# Patient Record
Sex: MALE | Race: WHITE
[De-identification: names, ages, dates, MRNs, and addresses within clinical notes are randomized per-mention and may not be internally consistent; named-entity substitution may affect disease eponyms.]

---

## 2020-03-03 ENCOUNTER — HOSPITAL ENCOUNTER (OUTPATIENT)
Dept: HOSPITAL 90 - WHH | Age: 65
Discharge: HOME | End: 2020-03-03
Attending: FAMILY MEDICINE
Payer: MEDICARE

## 2020-03-03 VITALS — DIASTOLIC BLOOD PRESSURE: 85 MMHG | SYSTOLIC BLOOD PRESSURE: 155 MMHG

## 2020-03-03 DIAGNOSIS — T86.828: Primary | ICD-10-CM

## 2020-03-03 DIAGNOSIS — E66.01: ICD-10-CM
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
## 2020-03-03 DIAGNOSIS — M13.851: ICD-10-CM

## 2020-03-03 DIAGNOSIS — I89.0: ICD-10-CM

## 2020-03-03 DIAGNOSIS — M13.852: ICD-10-CM

## 2020-03-03 DIAGNOSIS — L97.212: ICD-10-CM

## 2020-03-03 DIAGNOSIS — R01.1: ICD-10-CM

## 2020-03-03 PROCEDURE — 15271 SKIN SUB GRAFT TRNK/ARM/LEG: CPT

## 2020-03-10 ENCOUNTER — HOSPITAL ENCOUNTER (OUTPATIENT)
Dept: HOSPITAL 90 - WHH | Age: 65
Discharge: HOME | End: 2020-03-10
Attending: FAMILY MEDICINE
Payer: MEDICARE

## 2020-03-10 VITALS — SYSTOLIC BLOOD PRESSURE: 135 MMHG | DIASTOLIC BLOOD PRESSURE: 67 MMHG

## 2020-03-10 DIAGNOSIS — E66.01: ICD-10-CM

## 2020-03-10 DIAGNOSIS — I89.0: ICD-10-CM

## 2020-03-10 DIAGNOSIS — M13.852: ICD-10-CM

## 2020-03-10 DIAGNOSIS — R01.1: ICD-10-CM

## 2020-03-10 DIAGNOSIS — T86.828: Primary | ICD-10-CM

## 2020-03-10 DIAGNOSIS — M13.851: ICD-10-CM

## 2020-03-10 DIAGNOSIS — L97.212: ICD-10-CM
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
## 2020-03-10 PROCEDURE — 15271 SKIN SUB GRAFT TRNK/ARM/LEG: CPT

## 2020-03-17 ENCOUNTER — HOSPITAL ENCOUNTER (OUTPATIENT)
Dept: HOSPITAL 90 - WHH | Age: 65
Discharge: HOME | End: 2020-03-17
Attending: FAMILY MEDICINE
Payer: MEDICARE

## 2020-03-17 VITALS — DIASTOLIC BLOOD PRESSURE: 70 MMHG | SYSTOLIC BLOOD PRESSURE: 121 MMHG

## 2020-03-17 DIAGNOSIS — I89.0: ICD-10-CM

## 2020-03-17 DIAGNOSIS — T86.828: Primary | ICD-10-CM

## 2020-03-17 DIAGNOSIS — R01.1: ICD-10-CM
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
## 2020-03-17 DIAGNOSIS — I83.012: ICD-10-CM

## 2020-03-17 DIAGNOSIS — L97.211: ICD-10-CM

## 2020-03-17 DIAGNOSIS — E66.01: ICD-10-CM

## 2020-03-17 DIAGNOSIS — M13.852: ICD-10-CM

## 2020-03-17 DIAGNOSIS — M13.851: ICD-10-CM

## 2020-03-17 DIAGNOSIS — R73.03: ICD-10-CM

## 2020-03-17 PROCEDURE — 15271 SKIN SUB GRAFT TRNK/ARM/LEG: CPT

## 2020-03-27 ENCOUNTER — HOSPITAL ENCOUNTER (OUTPATIENT)
Dept: HOSPITAL 90 - WHH | Age: 65
Discharge: HOME | End: 2020-03-27
Attending: FAMILY MEDICINE
Payer: MEDICARE

## 2020-03-27 VITALS — SYSTOLIC BLOOD PRESSURE: 158 MMHG | DIASTOLIC BLOOD PRESSURE: 80 MMHG

## 2020-03-27 DIAGNOSIS — E66.01: ICD-10-CM

## 2020-03-27 DIAGNOSIS — L97.212: ICD-10-CM

## 2020-03-27 DIAGNOSIS — R01.1: ICD-10-CM

## 2020-03-27 DIAGNOSIS — I89.0: ICD-10-CM

## 2020-03-27 DIAGNOSIS — I83.012: ICD-10-CM

## 2020-03-27 DIAGNOSIS — E11.622: Primary | ICD-10-CM

## 2020-03-27 DIAGNOSIS — M13.852: ICD-10-CM

## 2020-03-27 DIAGNOSIS — M13.851: ICD-10-CM

## 2020-03-27 PROCEDURE — 11042 DBRDMT SUBQ TIS 1ST 20SQCM/<: CPT

## 2020-03-31 ENCOUNTER — HOSPITAL ENCOUNTER (OUTPATIENT)
Dept: HOSPITAL 90 - WHH | Age: 65
Discharge: HOME | End: 2020-03-31
Attending: FAMILY MEDICINE
Payer: MEDICARE

## 2020-03-31 VITALS — SYSTOLIC BLOOD PRESSURE: 144 MMHG | DIASTOLIC BLOOD PRESSURE: 77 MMHG

## 2020-03-31 DIAGNOSIS — E66.01: ICD-10-CM

## 2020-03-31 DIAGNOSIS — M13.852: ICD-10-CM

## 2020-03-31 DIAGNOSIS — M13.851: ICD-10-CM

## 2020-03-31 DIAGNOSIS — I83.012: ICD-10-CM

## 2020-03-31 DIAGNOSIS — E11.622: ICD-10-CM
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
## 2020-03-31 DIAGNOSIS — L97.211: ICD-10-CM

## 2020-03-31 DIAGNOSIS — R01.1: ICD-10-CM

## 2020-03-31 DIAGNOSIS — I89.0: ICD-10-CM

## 2020-03-31 DIAGNOSIS — T86.828: Primary | ICD-10-CM

## 2020-03-31 PROCEDURE — 15271 SKIN SUB GRAFT TRNK/ARM/LEG: CPT

## 2020-04-07 ENCOUNTER — HOSPITAL ENCOUNTER (OUTPATIENT)
Dept: HOSPITAL 90 - WHH | Age: 65
Discharge: HOME | End: 2020-04-07
Attending: FAMILY MEDICINE
Payer: MEDICARE

## 2020-04-07 VITALS — SYSTOLIC BLOOD PRESSURE: 114 MMHG | DIASTOLIC BLOOD PRESSURE: 68 MMHG

## 2020-04-07 DIAGNOSIS — M13.851: ICD-10-CM

## 2020-04-07 DIAGNOSIS — I83.012: ICD-10-CM

## 2020-04-07 DIAGNOSIS — E66.01: ICD-10-CM
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
## 2020-04-07 DIAGNOSIS — I89.0: ICD-10-CM

## 2020-04-07 DIAGNOSIS — M13.852: ICD-10-CM

## 2020-04-07 DIAGNOSIS — E11.622: ICD-10-CM

## 2020-04-07 DIAGNOSIS — I87.2: ICD-10-CM

## 2020-04-07 DIAGNOSIS — T86.828: Primary | ICD-10-CM

## 2020-04-07 DIAGNOSIS — R01.1: ICD-10-CM

## 2020-04-07 DIAGNOSIS — L97.211: ICD-10-CM

## 2020-04-07 PROCEDURE — 15271 SKIN SUB GRAFT TRNK/ARM/LEG: CPT

## 2020-04-14 ENCOUNTER — HOSPITAL ENCOUNTER (OUTPATIENT)
Dept: HOSPITAL 90 - WHH | Age: 65
Discharge: HOME | End: 2020-04-14
Attending: FAMILY MEDICINE
Payer: MEDICARE

## 2020-04-14 VITALS — SYSTOLIC BLOOD PRESSURE: 144 MMHG | DIASTOLIC BLOOD PRESSURE: 80 MMHG

## 2020-04-14 DIAGNOSIS — I89.0: ICD-10-CM

## 2020-04-14 DIAGNOSIS — E66.01: ICD-10-CM

## 2020-04-14 DIAGNOSIS — R01.1: ICD-10-CM

## 2020-04-14 DIAGNOSIS — L97.211: ICD-10-CM

## 2020-04-14 DIAGNOSIS — T86.828: Primary | ICD-10-CM

## 2020-04-14 DIAGNOSIS — I83.012: ICD-10-CM

## 2020-04-14 DIAGNOSIS — I87.2: ICD-10-CM

## 2020-04-14 DIAGNOSIS — M13.852: ICD-10-CM
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
## 2020-04-14 DIAGNOSIS — M13.851: ICD-10-CM

## 2020-04-14 DIAGNOSIS — E11.622: ICD-10-CM

## 2020-04-14 PROCEDURE — 15271 SKIN SUB GRAFT TRNK/ARM/LEG: CPT

## 2020-04-21 ENCOUNTER — HOSPITAL ENCOUNTER (OUTPATIENT)
Dept: HOSPITAL 90 - WHH | Age: 65
Discharge: HOME | End: 2020-04-21
Attending: FAMILY MEDICINE
Payer: MEDICARE

## 2020-04-21 VITALS — SYSTOLIC BLOOD PRESSURE: 126 MMHG | DIASTOLIC BLOOD PRESSURE: 82 MMHG

## 2020-04-21 DIAGNOSIS — M13.852: ICD-10-CM

## 2020-04-21 DIAGNOSIS — E11.622: ICD-10-CM

## 2020-04-21 DIAGNOSIS — I89.0: ICD-10-CM

## 2020-04-21 DIAGNOSIS — L97.211: ICD-10-CM
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
## 2020-04-21 DIAGNOSIS — M13.851: ICD-10-CM

## 2020-04-21 DIAGNOSIS — I83.012: ICD-10-CM

## 2020-04-21 DIAGNOSIS — E66.01: ICD-10-CM

## 2020-04-21 DIAGNOSIS — R01.1: ICD-10-CM

## 2020-04-21 DIAGNOSIS — T86.828: Primary | ICD-10-CM

## 2020-04-21 DIAGNOSIS — I87.2: ICD-10-CM

## 2020-04-21 PROCEDURE — 15271 SKIN SUB GRAFT TRNK/ARM/LEG: CPT

## 2020-04-28 ENCOUNTER — HOSPITAL ENCOUNTER (OUTPATIENT)
Dept: HOSPITAL 90 - WHH | Age: 65
Discharge: HOME | End: 2020-04-28
Attending: FAMILY MEDICINE
Payer: MEDICARE

## 2020-04-28 VITALS — DIASTOLIC BLOOD PRESSURE: 84 MMHG | SYSTOLIC BLOOD PRESSURE: 134 MMHG

## 2020-04-28 DIAGNOSIS — E11.622: ICD-10-CM

## 2020-04-28 DIAGNOSIS — I89.0: ICD-10-CM

## 2020-04-28 DIAGNOSIS — E66.01: ICD-10-CM

## 2020-04-28 DIAGNOSIS — T86.828: Primary | ICD-10-CM

## 2020-04-28 DIAGNOSIS — R01.1: ICD-10-CM

## 2020-04-28 DIAGNOSIS — M13.851: ICD-10-CM
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
## 2020-04-28 DIAGNOSIS — I83.012: ICD-10-CM

## 2020-04-28 DIAGNOSIS — M13.852: ICD-10-CM

## 2020-04-28 DIAGNOSIS — L97.211: ICD-10-CM

## 2020-04-28 DIAGNOSIS — I87.2: ICD-10-CM

## 2020-04-28 PROCEDURE — 15271 SKIN SUB GRAFT TRNK/ARM/LEG: CPT

## 2020-05-08 ENCOUNTER — HOSPITAL ENCOUNTER (OUTPATIENT)
Dept: HOSPITAL 90 - WHH | Age: 65
Discharge: HOME | End: 2020-05-08
Attending: FAMILY MEDICINE
Payer: MEDICARE

## 2020-05-08 VITALS — SYSTOLIC BLOOD PRESSURE: 135 MMHG | DIASTOLIC BLOOD PRESSURE: 76 MMHG

## 2020-05-08 DIAGNOSIS — R01.1: ICD-10-CM

## 2020-05-08 DIAGNOSIS — M13.852: ICD-10-CM

## 2020-05-08 DIAGNOSIS — L97.212: ICD-10-CM

## 2020-05-08 DIAGNOSIS — E11.622: ICD-10-CM

## 2020-05-08 DIAGNOSIS — M13.851: ICD-10-CM

## 2020-05-08 DIAGNOSIS — I87.2: ICD-10-CM

## 2020-05-08 DIAGNOSIS — T86.828: Primary | ICD-10-CM
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
## 2020-05-08 DIAGNOSIS — I89.0: ICD-10-CM

## 2020-05-08 DIAGNOSIS — E66.01: ICD-10-CM

## 2020-05-08 DIAGNOSIS — I83.012: ICD-10-CM

## 2020-05-08 PROCEDURE — 11042 DBRDMT SUBQ TIS 1ST 20SQCM/<: CPT

## 2020-05-12 ENCOUNTER — HOSPITAL ENCOUNTER (OUTPATIENT)
Dept: HOSPITAL 90 - WHH | Age: 65
Discharge: HOME | End: 2020-05-12
Attending: FAMILY MEDICINE
Payer: MEDICARE

## 2020-05-12 VITALS — DIASTOLIC BLOOD PRESSURE: 83 MMHG | SYSTOLIC BLOOD PRESSURE: 142 MMHG

## 2020-05-12 DIAGNOSIS — M13.852: ICD-10-CM

## 2020-05-12 DIAGNOSIS — M13.851: ICD-10-CM

## 2020-05-12 DIAGNOSIS — T86.828: Primary | ICD-10-CM

## 2020-05-12 DIAGNOSIS — E66.01: ICD-10-CM

## 2020-05-12 DIAGNOSIS — I87.2: ICD-10-CM
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
## 2020-05-12 DIAGNOSIS — I89.0: ICD-10-CM

## 2020-05-12 DIAGNOSIS — R01.1: ICD-10-CM

## 2020-05-12 DIAGNOSIS — I83.012: ICD-10-CM

## 2020-05-12 DIAGNOSIS — L97.212: ICD-10-CM

## 2020-05-12 PROCEDURE — 11042 DBRDMT SUBQ TIS 1ST 20SQCM/<: CPT

## 2020-05-13 ENCOUNTER — HOSPITAL ENCOUNTER (OUTPATIENT)
Dept: HOSPITAL 90 - RAH | Age: 65
Discharge: HOME | End: 2020-05-13
Attending: FAMILY MEDICINE
Payer: MEDICARE

## 2020-05-13 DIAGNOSIS — M71.21: Primary | ICD-10-CM

## 2020-05-13 DIAGNOSIS — L97.211: ICD-10-CM

## 2020-05-13 DIAGNOSIS — R60.0: ICD-10-CM

## 2020-05-13 LAB
BUN SERPL-MCNC: 17 MG/DL (ref 7–18)
CHLORIDE SERPL-SCNC: 104 MMOL/L (ref 101–111)
CO2 SERPL-SCNC: 31 MMOL/L (ref 21–32)
CREAT SERPL-MCNC: 1.4 MG/DL (ref 0.5–1.5)
GFR SERPL CREATININE-BSD FRML MDRD: 54 ML/MIN (ref 60–?)
GLUCOSE SERPL-MCNC: 120 MG/DL (ref 70–105)
POTASSIUM SERPL-SCNC: 5.2 MMOL/L (ref 3.5–5.1)
SODIUM SERPL-SCNC: 139 MMOL/L (ref 136–145)

## 2020-05-13 PROCEDURE — 80048 BASIC METABOLIC PNL TOTAL CA: CPT

## 2020-05-13 PROCEDURE — 36415 COLL VENOUS BLD VENIPUNCTURE: CPT

## 2020-05-13 PROCEDURE — 73702 CT LWR EXTREMITY W/O&W/DYE: CPT

## 2020-05-22 ENCOUNTER — HOSPITAL ENCOUNTER (OUTPATIENT)
Dept: HOSPITAL 90 - WHH | Age: 65
Discharge: HOME | End: 2020-05-22
Attending: FAMILY MEDICINE
Payer: MEDICARE

## 2020-05-22 VITALS — DIASTOLIC BLOOD PRESSURE: 79 MMHG | SYSTOLIC BLOOD PRESSURE: 160 MMHG
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
## 2020-05-22 DIAGNOSIS — I89.0: ICD-10-CM

## 2020-05-22 DIAGNOSIS — S91.101A: ICD-10-CM

## 2020-05-22 DIAGNOSIS — R01.1: ICD-10-CM

## 2020-05-22 DIAGNOSIS — M13.851: ICD-10-CM

## 2020-05-22 DIAGNOSIS — M71.21: ICD-10-CM

## 2020-05-22 DIAGNOSIS — M13.852: ICD-10-CM

## 2020-05-22 DIAGNOSIS — I87.2: ICD-10-CM

## 2020-05-22 DIAGNOSIS — L97.211: Primary | ICD-10-CM

## 2020-05-22 DIAGNOSIS — I83.90: ICD-10-CM
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
## 2020-05-22 DIAGNOSIS — E66.01: ICD-10-CM

## 2020-05-22 PROCEDURE — 11042 DBRDMT SUBQ TIS 1ST 20SQCM/<: CPT

## 2020-05-27 ENCOUNTER — HOSPITAL ENCOUNTER (OUTPATIENT)
Dept: HOSPITAL 90 - WHH | Age: 65
Discharge: HOME | End: 2020-05-27
Attending: SPECIALIST
Payer: MEDICARE

## 2020-05-27 VITALS — DIASTOLIC BLOOD PRESSURE: 85 MMHG | SYSTOLIC BLOOD PRESSURE: 141 MMHG

## 2020-05-27 DIAGNOSIS — E11.622: Primary | ICD-10-CM

## 2020-05-27 DIAGNOSIS — M13.851: ICD-10-CM

## 2020-05-27 DIAGNOSIS — L97.211: ICD-10-CM
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
## 2020-05-27 DIAGNOSIS — I87.2: ICD-10-CM

## 2020-05-27 DIAGNOSIS — M13.852: ICD-10-CM

## 2020-05-27 DIAGNOSIS — S91.101D: ICD-10-CM

## 2020-05-27 DIAGNOSIS — I83.90: ICD-10-CM

## 2020-05-27 DIAGNOSIS — M71.21: ICD-10-CM

## 2020-05-27 DIAGNOSIS — I89.0: ICD-10-CM

## 2020-05-27 DIAGNOSIS — E66.01: ICD-10-CM

## 2020-05-27 NOTE — NUR
Patient presents to clinic today for evaluation by Dr. Mariscal for application of Stravix. 
 Patient is in agreement and procedure will be scheduled once authorization obtained. 

-------------------------------------------------------------------------------

Addendum: 05/27/20 at 1503 by OUSMANE ARANDA RN/JUAN

-------------------------------------------------------------------------------

Amended: Links added.

## 2020-05-29 LAB
BASOPHILS NFR BLD AUTO: 0.3 % (ref 0–5)
BUN SERPL-MCNC: 22 MG/DL (ref 7–18)
CHLORIDE SERPL-SCNC: 101 MMOL/L (ref 101–111)
CO2 SERPL-SCNC: 29 MMOL/L (ref 21–32)
CREAT SERPL-MCNC: 1.3 MG/DL (ref 0.5–1.5)
EOSINOPHIL NFR BLD AUTO: 0.1 % (ref 0–8)
ERYTHROCYTE [DISTWIDTH] IN BLOOD BY AUTOMATED COUNT: 13.3 % (ref 11–15.5)
GFR SERPL CREATININE-BSD FRML MDRD: 59 ML/MIN (ref 60–?)
GLUCOSE SERPL-MCNC: 131 MG/DL (ref 70–105)
HCT VFR BLD AUTO: 40.2 % (ref 42–54)
INR PPP: 0.98 (ref 0.85–1.15)
LYMPHOCYTES NFR SPEC AUTO: 11.6 % (ref 21–51)
MCH RBC QN AUTO: 32.8 PG (ref 27–33)
MCHC RBC AUTO-ENTMCNC: 32.8 G/DL (ref 32–36)
MCV RBC AUTO: 99.8 FL (ref 79–99)
MONOCYTES NFR BLD AUTO: 4.4 % (ref 3–13)
NEUTROPHILS NFR BLD AUTO: 83.1 % (ref 40–77)
NRBC BLD MANUAL-RTO: 0 % (ref 0–0.19)
PLATELET # BLD AUTO: 244 K/UL (ref 130–400)
POTASSIUM SERPL-SCNC: 4.8 MMOL/L (ref 3.5–5.1)
PROTHROMBIN TIME: 10.6 SEC (ref 9.6–11.6)
RBC # BLD AUTO: 4.03 MIL/UL (ref 4.5–6.2)
SODIUM SERPL-SCNC: 136 MMOL/L (ref 136–145)
WBC # BLD AUTO: 11.1 K/UL (ref 4.8–10.8)

## 2020-06-02 VITALS — DIASTOLIC BLOOD PRESSURE: 85 MMHG | SYSTOLIC BLOOD PRESSURE: 159 MMHG

## 2020-06-03 ENCOUNTER — HOSPITAL ENCOUNTER (OUTPATIENT)
Dept: HOSPITAL 90 - DAH | Age: 65
Discharge: HOME | End: 2020-06-03
Attending: SPECIALIST
Payer: MEDICARE

## 2020-06-03 VITALS — SYSTOLIC BLOOD PRESSURE: 154 MMHG | DIASTOLIC BLOOD PRESSURE: 97 MMHG

## 2020-06-03 VITALS — DIASTOLIC BLOOD PRESSURE: 84 MMHG | SYSTOLIC BLOOD PRESSURE: 153 MMHG

## 2020-06-03 VITALS — SYSTOLIC BLOOD PRESSURE: 161 MMHG | DIASTOLIC BLOOD PRESSURE: 81 MMHG

## 2020-06-03 VITALS — SYSTOLIC BLOOD PRESSURE: 152 MMHG | DIASTOLIC BLOOD PRESSURE: 85 MMHG

## 2020-06-03 VITALS — SYSTOLIC BLOOD PRESSURE: 153 MMHG | DIASTOLIC BLOOD PRESSURE: 90 MMHG

## 2020-06-03 VITALS — BODY MASS INDEX: 40.71 KG/M2 | WEIGHT: 259.4 LBS | HEIGHT: 67 IN

## 2020-06-03 VITALS — SYSTOLIC BLOOD PRESSURE: 152 MMHG | DIASTOLIC BLOOD PRESSURE: 96 MMHG

## 2020-06-03 VITALS — SYSTOLIC BLOOD PRESSURE: 129 MMHG | DIASTOLIC BLOOD PRESSURE: 81 MMHG

## 2020-06-03 VITALS — DIASTOLIC BLOOD PRESSURE: 76 MMHG | SYSTOLIC BLOOD PRESSURE: 159 MMHG

## 2020-06-03 VITALS — SYSTOLIC BLOOD PRESSURE: 146 MMHG | DIASTOLIC BLOOD PRESSURE: 79 MMHG

## 2020-06-03 VITALS — SYSTOLIC BLOOD PRESSURE: 130 MMHG | DIASTOLIC BLOOD PRESSURE: 82 MMHG

## 2020-06-03 VITALS — SYSTOLIC BLOOD PRESSURE: 122 MMHG | DIASTOLIC BLOOD PRESSURE: 75 MMHG

## 2020-06-03 DIAGNOSIS — Z11.59: ICD-10-CM

## 2020-06-03 DIAGNOSIS — M19.90: ICD-10-CM

## 2020-06-03 DIAGNOSIS — Z79.01: ICD-10-CM

## 2020-06-03 DIAGNOSIS — L08.89: Primary | ICD-10-CM

## 2020-06-03 PROCEDURE — 85025 COMPLETE CBC W/AUTO DIFF WBC: CPT

## 2020-06-03 PROCEDURE — 80048 BASIC METABOLIC PNL TOTAL CA: CPT

## 2020-06-03 PROCEDURE — 93005 ELECTROCARDIOGRAM TRACING: CPT

## 2020-06-03 PROCEDURE — 15271 SKIN SUB GRAFT TRNK/ARM/LEG: CPT

## 2020-06-03 PROCEDURE — 36415 COLL VENOUS BLD VENIPUNCTURE: CPT

## 2020-06-03 PROCEDURE — 85610 PROTHROMBIN TIME: CPT

## 2020-06-03 PROCEDURE — 71045 X-RAY EXAM CHEST 1 VIEW: CPT

## 2020-06-03 NOTE — NUR
RECEIVE

PT RECEIVED FROM PACU VIA STRETCHER AWAKE ALERT ORIENTED. PT STABLE. NO COMPLAINTS MADE. 
DRESSING TO RIGHT CALF DRY AND INTACT, NO OOZING NOTED. CALL BELL WITHIN REACH, WILL CALL 
WIFE FOR DISCHARGE INSTRUCTIONS AND RIDE HOME.

## 2020-06-03 NOTE — NUR
DISCHARGE

PT DISCHARGED VIA WHEELCHAIR WITH WIFE. PT STABLE. DRESSING TO RIGHT CALF REMAINS DRY AND 
INTACT. CALLED DR. RING WITH REGARDS TO WEIGHT BEARING STATUS, AS PER DR. RING, PT 
MAY AMBULATE LIKE AS PRIOR TO SURGERY. PT INSTRUCTED. AS PER DR. RING, SEND OINTMENT 
WITH PT AND HAVE HIM FOLLOW UP WITH WOUND CARE. PT STATES THAT DR. RING HAD TOLD HIM 
NOT TO TOUCH DRESSING. DISCHARGE INSTRUCTIONS GIVEN TO WIFE AND PT, BOTH VERBALIZED 
UNDERSTANDING. VOIDED PRIOR TO DISCHARGE.

## 2020-06-09 ENCOUNTER — HOSPITAL ENCOUNTER (OUTPATIENT)
Dept: HOSPITAL 90 - WHH | Age: 65
Discharge: HOME | End: 2020-06-09
Attending: FAMILY MEDICINE
Payer: MEDICARE

## 2020-06-09 VITALS — SYSTOLIC BLOOD PRESSURE: 120 MMHG | DIASTOLIC BLOOD PRESSURE: 80 MMHG
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
## 2020-06-09 DIAGNOSIS — S91.101D: ICD-10-CM

## 2020-06-09 DIAGNOSIS — I89.0: ICD-10-CM

## 2020-06-09 DIAGNOSIS — M13.851: ICD-10-CM

## 2020-06-09 DIAGNOSIS — E66.01: ICD-10-CM
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
## 2020-06-09 DIAGNOSIS — T86.828: Primary | ICD-10-CM

## 2020-06-09 DIAGNOSIS — I83.90: ICD-10-CM

## 2020-06-09 DIAGNOSIS — M71.21: ICD-10-CM
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
## 2020-06-09 DIAGNOSIS — E11.622: ICD-10-CM

## 2020-06-09 DIAGNOSIS — I87.2: ICD-10-CM

## 2020-06-09 DIAGNOSIS — M13.852: ICD-10-CM

## 2020-06-09 DIAGNOSIS — L97.212: ICD-10-CM

## 2020-06-09 PROCEDURE — 11042 DBRDMT SUBQ TIS 1ST 20SQCM/<: CPT

## 2020-06-12 ENCOUNTER — HOSPITAL ENCOUNTER (OUTPATIENT)
Dept: HOSPITAL 90 - WHH | Age: 65
Discharge: HOME | End: 2020-06-12
Attending: FAMILY MEDICINE
Payer: MEDICARE

## 2020-06-12 VITALS — SYSTOLIC BLOOD PRESSURE: 159 MMHG | DIASTOLIC BLOOD PRESSURE: 90 MMHG

## 2020-06-12 DIAGNOSIS — T81.41XD: Primary | ICD-10-CM

## 2020-06-12 DIAGNOSIS — I89.0: ICD-10-CM

## 2020-06-12 DIAGNOSIS — S91.101D: ICD-10-CM

## 2020-06-12 DIAGNOSIS — L97.211: ICD-10-CM

## 2020-06-12 DIAGNOSIS — M13.851: ICD-10-CM

## 2020-06-12 DIAGNOSIS — E11.622: ICD-10-CM
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
## 2020-06-12 DIAGNOSIS — I83.90: ICD-10-CM

## 2020-06-12 DIAGNOSIS — M71.21: ICD-10-CM

## 2020-06-12 DIAGNOSIS — I87.2: ICD-10-CM

## 2020-06-12 DIAGNOSIS — M13.852: ICD-10-CM

## 2020-06-12 DIAGNOSIS — E66.01: ICD-10-CM
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
## 2020-06-16 ENCOUNTER — HOSPITAL ENCOUNTER (OUTPATIENT)
Dept: HOSPITAL 90 - WHH | Age: 65
Discharge: HOME | End: 2020-06-16
Attending: FAMILY MEDICINE
Payer: MEDICARE

## 2020-06-16 VITALS
HEART RATE: 76 BPM | TEMPERATURE: 98.3 F | DIASTOLIC BLOOD PRESSURE: 85 MMHG | SYSTOLIC BLOOD PRESSURE: 137 MMHG | RESPIRATION RATE: 18 BRPM

## 2020-06-16 DIAGNOSIS — I83.90: ICD-10-CM

## 2020-06-16 DIAGNOSIS — I87.2: ICD-10-CM

## 2020-06-16 DIAGNOSIS — L97.212: ICD-10-CM

## 2020-06-16 DIAGNOSIS — M71.21: ICD-10-CM

## 2020-06-16 DIAGNOSIS — T81.41XD: Primary | ICD-10-CM

## 2020-06-16 DIAGNOSIS — E66.01: ICD-10-CM

## 2020-06-16 DIAGNOSIS — M19.90: ICD-10-CM
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
## 2020-06-16 DIAGNOSIS — I83.012: ICD-10-CM

## 2020-06-16 DIAGNOSIS — E11.622: ICD-10-CM

## 2020-06-16 DIAGNOSIS — Z79.01: ICD-10-CM

## 2020-06-16 DIAGNOSIS — I89.0: ICD-10-CM

## 2020-06-16 PROCEDURE — 11042 DBRDMT SUBQ TIS 1ST 20SQCM/<: CPT

## 2020-06-19 ENCOUNTER — HOSPITAL ENCOUNTER (OUTPATIENT)
Dept: HOSPITAL 90 - WHH | Age: 65
Discharge: HOME | End: 2020-06-19
Attending: FAMILY MEDICINE
Payer: MEDICARE

## 2020-06-19 DIAGNOSIS — Z79.01: ICD-10-CM

## 2020-06-19 DIAGNOSIS — I83.90: ICD-10-CM

## 2020-06-19 DIAGNOSIS — E66.01: ICD-10-CM

## 2020-06-19 DIAGNOSIS — M13.851: ICD-10-CM

## 2020-06-19 DIAGNOSIS — I89.0: ICD-10-CM

## 2020-06-19 DIAGNOSIS — M13.852: ICD-10-CM

## 2020-06-19 DIAGNOSIS — I87.2: ICD-10-CM

## 2020-06-19 DIAGNOSIS — T81.41XD: Primary | ICD-10-CM

## 2020-06-19 DIAGNOSIS — E11.9: ICD-10-CM
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
## 2020-06-23 ENCOUNTER — HOSPITAL ENCOUNTER (OUTPATIENT)
Dept: HOSPITAL 90 - WHH | Age: 65
Discharge: HOME | End: 2020-06-23
Attending: FAMILY MEDICINE
Payer: MEDICARE

## 2020-06-23 DIAGNOSIS — E11.622: ICD-10-CM

## 2020-06-23 DIAGNOSIS — Z79.01: ICD-10-CM

## 2020-06-23 DIAGNOSIS — M19.90: ICD-10-CM

## 2020-06-23 DIAGNOSIS — E66.01: ICD-10-CM
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
## 2020-06-23 DIAGNOSIS — I87.2: ICD-10-CM

## 2020-06-23 DIAGNOSIS — I83.012: ICD-10-CM

## 2020-06-23 DIAGNOSIS — I89.0: ICD-10-CM

## 2020-06-23 DIAGNOSIS — T81.41XD: Primary | ICD-10-CM

## 2020-06-23 DIAGNOSIS — M16.12: ICD-10-CM

## 2020-06-23 DIAGNOSIS — L97.212: ICD-10-CM

## 2020-06-23 DIAGNOSIS — M47.814: ICD-10-CM

## 2020-06-23 PROCEDURE — 11042 DBRDMT SUBQ TIS 1ST 20SQCM/<: CPT

## 2020-06-24 ENCOUNTER — HOSPITAL ENCOUNTER (OUTPATIENT)
Dept: HOSPITAL 90 - RAH | Age: 65
Discharge: HOME | End: 2020-06-24
Attending: FAMILY MEDICINE
Payer: MEDICARE

## 2020-06-24 DIAGNOSIS — M47.814: ICD-10-CM

## 2020-06-24 DIAGNOSIS — R65.20: ICD-10-CM
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
## 2020-06-24 DIAGNOSIS — T81.49XD: Primary | ICD-10-CM

## 2020-06-24 LAB
APTT PPP: 24.8 SEC (ref 26.3–35.5)
BASOPHILS NFR BLD AUTO: 0.8 % (ref 0–5)
BUN SERPL-MCNC: 22 MG/DL (ref 7–18)
CHLORIDE SERPL-SCNC: 106 MMOL/L (ref 101–111)
CO2 SERPL-SCNC: 30 MMOL/L (ref 21–32)
CREAT SERPL-MCNC: 1.4 MG/DL (ref 0.5–1.5)
EOSINOPHIL NFR BLD AUTO: 3.4 % (ref 0–8)
ERYTHROCYTE [DISTWIDTH] IN BLOOD BY AUTOMATED COUNT: 13.3 % (ref 11–15.5)
GFR SERPL CREATININE-BSD FRML MDRD: 54 ML/MIN (ref 60–?)
GLUCOSE SERPL-MCNC: 106 MG/DL (ref 70–105)
HCT VFR BLD AUTO: 39.8 % (ref 42–54)
INR PPP: 0.95 (ref 0.85–1.15)
LYMPHOCYTES NFR SPEC AUTO: 34.5 % (ref 21–51)
MCH RBC QN AUTO: 33.6 PG (ref 27–33)
MCHC RBC AUTO-ENTMCNC: 33.7 G/DL (ref 32–36)
MCV RBC AUTO: 99.7 FL (ref 79–99)
MONOCYTES NFR BLD AUTO: 14.7 % (ref 3–13)
NEUTROPHILS NFR BLD AUTO: 46.1 % (ref 40–77)
NRBC BLD MANUAL-RTO: 0 % (ref 0–0.19)
PLATELET # BLD AUTO: 221 K/UL (ref 130–400)
POTASSIUM SERPL-SCNC: 4.3 MMOL/L (ref 3.5–5.1)
PROTHROMBIN TIME: 10.3 SEC (ref 9.6–11.6)
RBC # BLD AUTO: 3.99 MIL/UL (ref 4.5–6.2)
SODIUM SERPL-SCNC: 139 MMOL/L (ref 136–145)
WBC # BLD AUTO: 6 K/UL (ref 4.8–10.8)

## 2020-06-24 PROCEDURE — 93005 ELECTROCARDIOGRAM TRACING: CPT

## 2020-06-24 PROCEDURE — 85730 THROMBOPLASTIN TIME PARTIAL: CPT

## 2020-06-24 PROCEDURE — 71046 X-RAY EXAM CHEST 2 VIEWS: CPT

## 2020-06-24 PROCEDURE — 80048 BASIC METABOLIC PNL TOTAL CA: CPT

## 2020-06-24 PROCEDURE — 85025 COMPLETE CBC W/AUTO DIFF WBC: CPT

## 2020-06-24 PROCEDURE — 85610 PROTHROMBIN TIME: CPT

## 2020-06-24 PROCEDURE — 36415 COLL VENOUS BLD VENIPUNCTURE: CPT

## 2020-06-30 ENCOUNTER — HOSPITAL ENCOUNTER (OUTPATIENT)
Dept: HOSPITAL 90 - WHH | Age: 65
Discharge: HOME | End: 2020-06-30
Attending: FAMILY MEDICINE
Payer: MEDICARE
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
## 2020-06-30 DIAGNOSIS — E11.622: ICD-10-CM

## 2020-06-30 DIAGNOSIS — I83.012: ICD-10-CM

## 2020-06-30 DIAGNOSIS — E66.01: ICD-10-CM

## 2020-06-30 DIAGNOSIS — R01.1: ICD-10-CM

## 2020-06-30 DIAGNOSIS — M16.0: ICD-10-CM

## 2020-06-30 DIAGNOSIS — I89.0: ICD-10-CM

## 2020-06-30 DIAGNOSIS — M16.12: ICD-10-CM

## 2020-06-30 DIAGNOSIS — I87.2: ICD-10-CM

## 2020-06-30 DIAGNOSIS — M19.90: ICD-10-CM

## 2020-06-30 DIAGNOSIS — L97.212: ICD-10-CM

## 2020-06-30 DIAGNOSIS — M47.819: ICD-10-CM

## 2020-06-30 DIAGNOSIS — Z79.01: ICD-10-CM

## 2020-06-30 DIAGNOSIS — T81.41XD: Primary | ICD-10-CM

## 2020-06-30 PROCEDURE — 11042 DBRDMT SUBQ TIS 1ST 20SQCM/<: CPT

## 2020-07-10 ENCOUNTER — HOSPITAL ENCOUNTER (OUTPATIENT)
Dept: HOSPITAL 90 - WHH | Age: 65
Discharge: HOME | End: 2020-07-10
Attending: FAMILY MEDICINE
Payer: MEDICARE

## 2020-07-10 DIAGNOSIS — I83.012: ICD-10-CM

## 2020-07-10 DIAGNOSIS — I87.2: ICD-10-CM

## 2020-07-10 DIAGNOSIS — T86.828: Primary | ICD-10-CM

## 2020-07-10 DIAGNOSIS — Z79.01: ICD-10-CM

## 2020-07-10 DIAGNOSIS — E66.01: ICD-10-CM

## 2020-07-10 DIAGNOSIS — M47.819: ICD-10-CM

## 2020-07-10 DIAGNOSIS — L97.212: ICD-10-CM

## 2020-07-10 DIAGNOSIS — R01.1: ICD-10-CM

## 2020-07-10 DIAGNOSIS — M19.90: ICD-10-CM

## 2020-07-10 DIAGNOSIS — I89.0: ICD-10-CM

## 2020-07-10 DIAGNOSIS — M16.0: ICD-10-CM

## 2020-07-10 DIAGNOSIS — M16.12: ICD-10-CM

## 2020-07-10 DIAGNOSIS — E11.622: ICD-10-CM
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
## 2020-07-14 ENCOUNTER — HOSPITAL ENCOUNTER (OUTPATIENT)
Dept: HOSPITAL 90 - WHH | Age: 65
Discharge: HOME | End: 2020-07-14
Attending: FAMILY MEDICINE
Payer: MEDICARE

## 2020-07-14 DIAGNOSIS — Z79.01: ICD-10-CM

## 2020-07-14 DIAGNOSIS — I83.012: ICD-10-CM

## 2020-07-14 DIAGNOSIS — E66.01: ICD-10-CM

## 2020-07-14 DIAGNOSIS — I87.2: ICD-10-CM

## 2020-07-14 DIAGNOSIS — M16.12: ICD-10-CM

## 2020-07-14 DIAGNOSIS — L97.212: ICD-10-CM

## 2020-07-14 DIAGNOSIS — I89.0: ICD-10-CM

## 2020-07-14 DIAGNOSIS — R01.1: ICD-10-CM
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
## 2020-07-14 DIAGNOSIS — M19.90: ICD-10-CM

## 2020-07-14 DIAGNOSIS — M47.819: ICD-10-CM

## 2020-07-14 DIAGNOSIS — E11.622: Primary | ICD-10-CM

## 2020-07-14 DIAGNOSIS — M16.0: ICD-10-CM

## 2020-07-21 ENCOUNTER — HOSPITAL ENCOUNTER (OUTPATIENT)
Dept: HOSPITAL 90 - WHH | Age: 65
End: 2020-07-21
Attending: FAMILY MEDICINE
Payer: MEDICARE

## 2020-07-21 DIAGNOSIS — R01.1: ICD-10-CM

## 2020-07-21 DIAGNOSIS — L97.212: ICD-10-CM

## 2020-07-21 DIAGNOSIS — I83.012: ICD-10-CM

## 2020-07-21 DIAGNOSIS — M16.12: ICD-10-CM

## 2020-07-21 DIAGNOSIS — I87.2: ICD-10-CM

## 2020-07-21 DIAGNOSIS — M16.0: ICD-10-CM

## 2020-07-21 DIAGNOSIS — M19.90: ICD-10-CM

## 2020-07-21 DIAGNOSIS — Z79.01: ICD-10-CM

## 2020-07-21 DIAGNOSIS — M47.819: ICD-10-CM

## 2020-07-21 DIAGNOSIS — E11.622: Primary | ICD-10-CM

## 2020-07-21 DIAGNOSIS — E66.01: ICD-10-CM

## 2020-07-21 DIAGNOSIS — I89.0: ICD-10-CM

## 2020-07-21 PROCEDURE — 11045 DBRDMT SUBQ TISS EACH ADDL: CPT

## 2020-07-21 PROCEDURE — 11042 DBRDMT SUBQ TIS 1ST 20SQCM/<: CPT

## 2020-08-07 ENCOUNTER — HOSPITAL ENCOUNTER (EMERGENCY)
Age: 65
Discharge: HOME | End: 2020-08-07
Attending: EMERGENCY MEDICINE
Payer: MEDICARE

## 2020-08-07 ENCOUNTER — OFFICE VISIT (OUTPATIENT)
Dept: URGENT CARE | Age: 65
End: 2020-08-07

## 2020-08-07 ENCOUNTER — APPOINTMENT (OUTPATIENT)
Dept: CT IMAGING | Age: 65
End: 2020-08-07
Payer: MEDICARE

## 2020-08-07 VITALS
TEMPERATURE: 98.7 F | OXYGEN SATURATION: 99 % | RESPIRATION RATE: 20 BRPM | WEIGHT: 260 LBS | DIASTOLIC BLOOD PRESSURE: 90 MMHG | SYSTOLIC BLOOD PRESSURE: 138 MMHG | HEART RATE: 84 BPM

## 2020-08-07 LAB
ALBUMIN SERPL-MCNC: 3.8 G/DL (ref 3.5–5.2)
ALP BLD-CCNC: 69 U/L (ref 40–130)
ALT SERPL-CCNC: 17 U/L (ref 5–41)
ANION GAP SERPL CALCULATED.3IONS-SCNC: 10 MMOL/L (ref 7–19)
AST SERPL-CCNC: 17 U/L (ref 5–40)
BASOPHILS ABSOLUTE: 0.1 K/UL (ref 0–0.2)
BASOPHILS RELATIVE PERCENT: 0.8 % (ref 0–1)
BILIRUB SERPL-MCNC: 0.4 MG/DL (ref 0.2–1.2)
BUN BLDV-MCNC: 16 MG/DL (ref 8–23)
CALCIUM SERPL-MCNC: 9.3 MG/DL (ref 8.8–10.2)
CHLORIDE BLD-SCNC: 104 MMOL/L (ref 98–111)
CO2: 27 MMOL/L (ref 22–29)
CREAT SERPL-MCNC: 1 MG/DL (ref 0.5–1.2)
EOSINOPHILS ABSOLUTE: 0.2 K/UL (ref 0–0.6)
EOSINOPHILS RELATIVE PERCENT: 2.2 % (ref 0–5)
GFR AFRICAN AMERICAN: >59
GFR NON-AFRICAN AMERICAN: >60
GLUCOSE BLD-MCNC: 112 MG/DL (ref 74–109)
HCT VFR BLD CALC: 42.7 % (ref 42–52)
HEMOGLOBIN: 13.6 G/DL (ref 14–18)
IMMATURE GRANULOCYTES #: 0 K/UL
LACTIC ACID, SEPSIS: 1.5 MG/DL (ref 0.5–1.9)
LYMPHOCYTES ABSOLUTE: 2.3 K/UL (ref 1.1–4.5)
LYMPHOCYTES RELATIVE PERCENT: 32.1 % (ref 20–40)
MCH RBC QN AUTO: 32 PG (ref 27–31)
MCHC RBC AUTO-ENTMCNC: 31.9 G/DL (ref 33–37)
MCV RBC AUTO: 100.5 FL (ref 80–94)
MONOCYTES ABSOLUTE: 0.9 K/UL (ref 0–0.9)
MONOCYTES RELATIVE PERCENT: 12.2 % (ref 0–10)
NEUTROPHILS ABSOLUTE: 3.8 K/UL (ref 1.5–7.5)
NEUTROPHILS RELATIVE PERCENT: 52.4 % (ref 50–65)
PDW BLD-RTO: 13.7 % (ref 11.5–14.5)
PLATELET # BLD: 254 K/UL (ref 130–400)
PMV BLD AUTO: 9.8 FL (ref 9.4–12.4)
POTASSIUM REFLEX MAGNESIUM: 4.9 MMOL/L (ref 3.5–5)
RBC # BLD: 4.25 M/UL (ref 4.7–6.1)
SODIUM BLD-SCNC: 141 MMOL/L (ref 136–145)
TOTAL PROTEIN: 7.6 G/DL (ref 6.6–8.7)
WBC # BLD: 7.2 K/UL (ref 4.8–10.8)

## 2020-08-07 PROCEDURE — 87040 BLOOD CULTURE FOR BACTERIA: CPT

## 2020-08-07 PROCEDURE — 87075 CULTR BACTERIA EXCEPT BLOOD: CPT

## 2020-08-07 PROCEDURE — 80053 COMPREHEN METABOLIC PANEL: CPT

## 2020-08-07 PROCEDURE — 85025 COMPLETE CBC W/AUTO DIFF WBC: CPT

## 2020-08-07 PROCEDURE — 99282 EMERGENCY DEPT VISIT SF MDM: CPT

## 2020-08-07 PROCEDURE — 87205 SMEAR GRAM STAIN: CPT

## 2020-08-07 PROCEDURE — 87070 CULTURE OTHR SPECIMN AEROBIC: CPT

## 2020-08-07 PROCEDURE — 83605 ASSAY OF LACTIC ACID: CPT

## 2020-08-07 PROCEDURE — 36415 COLL VENOUS BLD VENIPUNCTURE: CPT

## 2020-08-07 PROCEDURE — 87077 CULTURE AEROBIC IDENTIFY: CPT

## 2020-08-07 PROCEDURE — 87186 SC STD MICRODIL/AGAR DIL: CPT

## 2020-08-07 RX ORDER — DOXYCYCLINE HYCLATE 100 MG/1
100 CAPSULE ORAL 2 TIMES DAILY
Qty: 20 CAPSULE | Refills: 0 | Status: SHIPPED | OUTPATIENT
Start: 2020-08-07 | End: 2020-08-17

## 2020-08-07 ASSESSMENT — ENCOUNTER SYMPTOMS
COUGH: 0
EYE DISCHARGE: 0
EYE ITCHING: 0
SHORTNESS OF BREATH: 0
BACK PAIN: 0
COLOR CHANGE: 1
PHOTOPHOBIA: 0
APNEA: 0

## 2020-08-07 NOTE — ED PROVIDER NOTES
[facility] EMERGENCY DEPT  eMERGENCYdEPARTMENT eNCOUnter      Pt Name: Leti Buckley  MRN: [id number]  [place] 1955  Date of evaluation: 8/7/2020  Provider:JAMAICA Luna    CHIEF COMPLAINT       Chief Complaint   Patient presents with    Wound Check     lower R leg. snet from urgent care         HISTORY OF PRESENT ILLNESS  (Location/Symptom, Timing/Onset, Context/Setting, Quality, Duration, Modifying Factors, Severity.)   Leti Buckley is a 72 y.o. male who presents to the emergency department with complaints of bilateral leg complaints. Had skin graft done 3 weeks ago in [place]. From this area but was staying down there. Poor historian told to have sutures removed at 3 week yoselin. Admits to drainage over the past week. Denies fever. Has staph infection x 1 in his hx. admits his legs always look donna. Denies DVT hx. Denies fever. Denies pain. Has blistering over medial aspect of left leg that started over past week with weeping around from skin. Serous clear fluid. Went to urgent care and they sent him here for further eval. Is ambulatory denies injury to suggest blister from trauma. It is weepign serous fluid. The wound has dressing he has been doing himself. No abx on board. Skin graft for coverage of prior ulcer per patient. HPI    Nursing Notes were reviewed and I agree. REVIEW OF SYSTEMS    (2-9 systems for level 4, 10 or more for level 5)     Review of Systems   Constitutional: Negative for activity change, appetite change, chills and fever. HENT: Negative for congestion and dental problem. Eyes: Negative for photophobia, discharge and itching. Respiratory: Negative for apnea, cough and shortness of breath. Cardiovascular: Negative for chest pain. Musculoskeletal: Positive for joint swelling. Negative for arthralgias, back pain, gait problem, myalgias and neck pain. Skin: Positive for color change and wound. Negative for pallor and rash.    Neurological: Negative for dizziness, seizures and syncope. Psychiatric/Behavioral: Negative for agitation. The patient is not nervous/anxious. Except as noted above the remainder of the review of systems was reviewed and negative. PAST MEDICAL HISTORY   No past medical history on file. SURGICAL HISTORY     No past surgical history on file. CURRENT MEDICATIONS       Discharge Medication List as of 8/7/2020  3:55 PM          ALLERGIES     Patient has no known allergies. FAMILY HISTORY     No family history on file.        SOCIAL HISTORY       Social History     Socioeconomic History    Marital status:      Spouse name: Not on file    Number of children: Not on file    Years of education: Not on file    Highest education level: Not on file   Occupational History    Not on file   Social Needs    Financial resource strain: Not on file    Food insecurity     Worry: Not on file     Inability: Not on file    Transportation needs     Medical: Not on file     Non-medical: Not on file   Tobacco Use    Smoking status: Not on file   Substance and Sexual Activity    Alcohol use: Not on file    Drug use: Not on file    Sexual activity: Not on file   Lifestyle    Physical activity     Days per week: Not on file     Minutes per session: Not on file    Stress: Not on file   Relationships    Social connections     Talks on phone: Not on file     Gets together: Not on file     Attends Temple service: Not on file     Active member of club or organization: Not on file     Attends meetings of clubs or organizations: Not on file     Relationship status: Not on file    Intimate partner violence     Fear of current or ex partner: Not on file     Emotionally abused: Not on file     Physically abused: Not on file     Forced sexual activity: Not on file   Other Topics Concern    Not on file   Social History Narrative    Not on file       SCREENINGS           PHYSICAL EXAM    (up to 7 forlevel 4, 8 or more for level 5)     ED Triage Vitals [08/07/20 1231]   BP Temp Temp Source Pulse Resp SpO2 Height Weight   (!) 138/90 98.7 °F (37.1 °C) Oral 84 20 99 % -- 260 lb (117.9 kg)       Physical Exam  Vitals signs and nursing note reviewed. Constitutional:       General: He is not in acute distress. Appearance: Normal appearance. He is well-developed. He is not diaphoretic. HENT:      Head: Normocephalic and atraumatic. Right Ear: Tympanic membrane, ear canal and external ear normal.      Left Ear: Tympanic membrane, ear canal and external ear normal.      Nose: Nose normal.      Mouth/Throat:      Mouth: Mucous membranes are moist.   Eyes:      Pupils: Pupils are equal, round, and reactive to light. Neck:      Musculoskeletal: Normal range of motion and neck supple. Trachea: No tracheal deviation. Cardiovascular:      Rate and Rhythm: Normal rate and regular rhythm. Pulses: Normal pulses. Heart sounds: Normal heart sounds. No murmur. Pulmonary:      Effort: Pulmonary effort is normal.      Breath sounds: Normal breath sounds. No stridor. No wheezing. Chest:      Chest wall: No tenderness. Abdominal:      General: Abdomen is flat. Bowel sounds are normal. There is no distension. Palpations: Abdomen is soft. Tenderness: There is no abdominal tenderness. Musculoskeletal: Normal range of motion. General: No tenderness or signs of injury. Legs:         Feet:    Skin:     General: Skin is warm and dry. Capillary Refill: Capillary refill takes less than 2 seconds. Findings: Erythema and lesion present. Neurological:      General: No focal deficit present. Mental Status: He is alert and oriented to person, place, and time. Mental status is at baseline. Psychiatric:         Mood and Affect: Mood normal.         Behavior: Behavior normal.         Thought Content:  Thought content normal.         Judgment: Judgment normal.           DIAGNOSTIC RESULTS     RADIOLOGY:   Non-plain

## 2020-08-07 NOTE — ED NOTES
Patient DCd without AMA paperwork, PA made aware, patient aware of AMA status     Julius Gallego RN  08/07/20 [id number]

## 2020-08-07 NOTE — PROGRESS NOTES
Patient presents to Urgent Care with needing stitches removed from RLE. Patient states they were put in during a skin graft from a doctor in [place]. They have been in 3 weeks. Stiches appear intact, however they are buried underneath tissue growth due to length of time being left in. Skin graft approx. 6 cm across; has a red border and dried drainage. Mixture of pink and red granulation tissue seen. Patient has bilateral lower extremity erythema, swelling, with weeping seen. Patient has a large bullous vesicle to LLE inner aspect. Patient states he has had BLE cellulitis and has been treated with up to 4 antibiotics at a time. Due to patient history of resistant cellulitis and current status of skin graft recommended higher level of care.

## 2020-08-11 LAB
ANAEROBIC CULTURE: ABNORMAL
GRAM STAIN RESULT: ABNORMAL
ORGANISM: ABNORMAL
WOUND/ABSCESS: ABNORMAL

## 2020-08-12 LAB
BLOOD CULTURE, ROUTINE: NORMAL
CULTURE, BLOOD 2: NORMAL

## 2020-08-12 NOTE — ED NOTES
Called patient and spoke to him on the phone. He is to stop his doxycycline and start Levaquin. He would like me to call this into [facility] in [place]. So it will be Levaquin 500 mg once daily x10 days with no refills. Patient did state the infection has worsened. He tell me have been going on for years. I told him he could return to the ER at any time. He expressed understanding.   We will start his Levaquin today     Marion Cowden, APRN  08/12/20 2239

## 2020-09-29 ENCOUNTER — HOSPITAL ENCOUNTER (OUTPATIENT)
Dept: HOSPITAL 90 - WHH | Age: 65
Discharge: HOME | End: 2020-09-29
Attending: FAMILY MEDICINE
Payer: MEDICARE

## 2020-09-29 DIAGNOSIS — L97.222: ICD-10-CM

## 2020-09-29 DIAGNOSIS — Z79.01: ICD-10-CM

## 2020-09-29 DIAGNOSIS — E11.622: Primary | ICD-10-CM

## 2020-09-29 DIAGNOSIS — I87.2: ICD-10-CM

## 2020-09-29 DIAGNOSIS — L97.212: ICD-10-CM

## 2020-09-29 DIAGNOSIS — I89.0: ICD-10-CM

## 2020-09-29 DIAGNOSIS — I70.242: ICD-10-CM

## 2020-09-29 DIAGNOSIS — E66.01: ICD-10-CM

## 2020-09-29 DIAGNOSIS — M16.0: ICD-10-CM

## 2020-09-29 DIAGNOSIS — I70.232: ICD-10-CM

## 2020-09-29 PROCEDURE — 11045 DBRDMT SUBQ TISS EACH ADDL: CPT

## 2020-09-29 PROCEDURE — 87077 CULTURE AEROBIC IDENTIFY: CPT

## 2020-09-29 PROCEDURE — 11042 DBRDMT SUBQ TIS 1ST 20SQCM/<: CPT

## 2020-09-29 PROCEDURE — 87186 SC STD MICRODIL/AGAR DIL: CPT

## 2020-09-29 PROCEDURE — 87070 CULTURE OTHR SPECIMN AEROBIC: CPT

## 2020-10-13 ENCOUNTER — HOSPITAL ENCOUNTER (OUTPATIENT)
Dept: HOSPITAL 90 - WHH | Age: 65
Discharge: HOME | End: 2020-10-13
Attending: FAMILY MEDICINE
Payer: MEDICARE

## 2020-10-13 DIAGNOSIS — T81.41XD: Primary | ICD-10-CM

## 2020-10-13 DIAGNOSIS — I70.242: ICD-10-CM
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
## 2020-10-13 DIAGNOSIS — E66.01: ICD-10-CM

## 2020-10-13 DIAGNOSIS — I70.232: ICD-10-CM

## 2020-10-13 DIAGNOSIS — E11.622: ICD-10-CM

## 2020-10-13 DIAGNOSIS — I89.0: ICD-10-CM

## 2020-10-13 DIAGNOSIS — L97.212: ICD-10-CM

## 2020-10-13 DIAGNOSIS — M16.0: ICD-10-CM

## 2020-10-13 DIAGNOSIS — Z79.01: ICD-10-CM

## 2020-10-13 DIAGNOSIS — I87.2: ICD-10-CM

## 2020-10-13 DIAGNOSIS — L97.222: ICD-10-CM

## 2020-10-13 PROCEDURE — 11045 DBRDMT SUBQ TISS EACH ADDL: CPT

## 2020-10-13 PROCEDURE — 11042 DBRDMT SUBQ TIS 1ST 20SQCM/<: CPT

## 2020-10-20 ENCOUNTER — HOSPITAL ENCOUNTER (OUTPATIENT)
Dept: HOSPITAL 90 - WHH | Age: 65
Discharge: HOME | End: 2020-10-20
Attending: FAMILY MEDICINE
Payer: MEDICARE

## 2020-10-20 DIAGNOSIS — I70.248: ICD-10-CM

## 2020-10-20 DIAGNOSIS — I70.232: ICD-10-CM

## 2020-10-20 DIAGNOSIS — L97.222: ICD-10-CM

## 2020-10-20 DIAGNOSIS — Z79.01: ICD-10-CM

## 2020-10-20 DIAGNOSIS — I70.242: ICD-10-CM

## 2020-10-20 DIAGNOSIS — T81.41XD: Primary | ICD-10-CM

## 2020-10-20 DIAGNOSIS — I87.2: ICD-10-CM

## 2020-10-20 DIAGNOSIS — E11.622: ICD-10-CM

## 2020-10-20 DIAGNOSIS — L97.212: ICD-10-CM

## 2020-10-20 DIAGNOSIS — I89.0: ICD-10-CM

## 2020-10-20 DIAGNOSIS — L97.822: ICD-10-CM
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
## 2020-10-20 DIAGNOSIS — E66.01: ICD-10-CM

## 2020-10-20 DIAGNOSIS — M16.11: ICD-10-CM

## 2020-10-20 PROCEDURE — 11042 DBRDMT SUBQ TIS 1ST 20SQCM/<: CPT

## 2020-10-20 PROCEDURE — 11045 DBRDMT SUBQ TISS EACH ADDL: CPT

## 2020-11-03 ENCOUNTER — HOSPITAL ENCOUNTER (OUTPATIENT)
Dept: HOSPITAL 90 - WHH | Age: 65
Discharge: HOME | End: 2020-11-03
Attending: FAMILY MEDICINE
Payer: MEDICARE
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
## 2020-11-03 DIAGNOSIS — I70.242: ICD-10-CM

## 2020-11-03 DIAGNOSIS — I89.0: ICD-10-CM

## 2020-11-03 DIAGNOSIS — M16.11: ICD-10-CM

## 2020-11-03 DIAGNOSIS — T81.41XD: Primary | ICD-10-CM

## 2020-11-03 DIAGNOSIS — E66.01: ICD-10-CM

## 2020-11-03 DIAGNOSIS — L97.228: ICD-10-CM

## 2020-11-03 DIAGNOSIS — I70.232: ICD-10-CM

## 2020-11-03 DIAGNOSIS — E11.622: ICD-10-CM

## 2020-11-03 DIAGNOSIS — L97.828: ICD-10-CM

## 2020-11-03 DIAGNOSIS — I70.248: ICD-10-CM

## 2020-11-03 DIAGNOSIS — L97.212: ICD-10-CM

## 2020-11-03 DIAGNOSIS — Z79.01: ICD-10-CM

## 2020-11-03 DIAGNOSIS — I87.2: ICD-10-CM

## 2020-11-03 PROCEDURE — 11042 DBRDMT SUBQ TIS 1ST 20SQCM/<: CPT

## 2020-11-10 ENCOUNTER — HOSPITAL ENCOUNTER (OUTPATIENT)
Dept: HOSPITAL 90 - WHH | Age: 65
Discharge: HOME | End: 2020-11-10
Attending: FAMILY MEDICINE
Payer: MEDICARE

## 2020-11-10 DIAGNOSIS — I89.0: ICD-10-CM

## 2020-11-10 DIAGNOSIS — I87.2: ICD-10-CM

## 2020-11-10 DIAGNOSIS — Z79.01: ICD-10-CM

## 2020-11-10 DIAGNOSIS — E11.622: Primary | ICD-10-CM

## 2020-11-10 DIAGNOSIS — L97.221: ICD-10-CM

## 2020-11-10 DIAGNOSIS — L97.212: ICD-10-CM

## 2020-11-10 DIAGNOSIS — M16.11: ICD-10-CM

## 2020-11-10 DIAGNOSIS — E66.01: ICD-10-CM

## 2020-11-10 PROCEDURE — 87070 CULTURE OTHR SPECIMN AEROBIC: CPT

## 2020-11-10 PROCEDURE — 11042 DBRDMT SUBQ TIS 1ST 20SQCM/<: CPT

## 2020-11-10 PROCEDURE — 87186 SC STD MICRODIL/AGAR DIL: CPT

## 2020-11-10 PROCEDURE — 87077 CULTURE AEROBIC IDENTIFY: CPT

## 2020-11-17 ENCOUNTER — HOSPITAL ENCOUNTER (OUTPATIENT)
Dept: HOSPITAL 90 - WHH | Age: 65
Discharge: HOME | End: 2020-11-17
Attending: FAMILY MEDICINE
Payer: MEDICARE

## 2020-11-17 DIAGNOSIS — M16.11: ICD-10-CM

## 2020-11-17 DIAGNOSIS — L97.221: ICD-10-CM

## 2020-11-17 DIAGNOSIS — E11.622: ICD-10-CM
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
## 2020-11-17 DIAGNOSIS — T81.41XD: Primary | ICD-10-CM

## 2020-11-17 DIAGNOSIS — E66.01: ICD-10-CM

## 2020-11-17 DIAGNOSIS — I87.333: ICD-10-CM

## 2020-11-17 DIAGNOSIS — I89.0: ICD-10-CM

## 2020-11-17 DIAGNOSIS — Z79.01: ICD-10-CM

## 2020-11-17 DIAGNOSIS — L97.212: ICD-10-CM

## 2020-11-17 DIAGNOSIS — I87.2: ICD-10-CM

## 2020-11-17 PROCEDURE — 11042 DBRDMT SUBQ TIS 1ST 20SQCM/<: CPT

## 2020-11-19 ENCOUNTER — HOSPITAL ENCOUNTER (OUTPATIENT)
Dept: HOSPITAL 90 - DAH | Age: 65
End: 2020-11-19
Attending: FAMILY MEDICINE
Payer: MEDICARE

## 2020-11-19 DIAGNOSIS — Z45.2: Primary | ICD-10-CM

## 2020-11-19 DIAGNOSIS — I87.2: ICD-10-CM

## 2020-11-19 DIAGNOSIS — L97.221: ICD-10-CM

## 2020-11-19 DIAGNOSIS — M16.11: ICD-10-CM

## 2020-11-19 DIAGNOSIS — L97.212: ICD-10-CM

## 2020-11-19 DIAGNOSIS — I87.323: ICD-10-CM

## 2020-11-19 DIAGNOSIS — M19.90: ICD-10-CM

## 2020-11-19 DIAGNOSIS — Z79.899: ICD-10-CM

## 2020-11-19 LAB
INR PPP: 1.02 (ref 0.85–1.15)
PROTHROMBIN TIME: 11 SEC (ref 9.6–11.6)

## 2020-11-19 PROCEDURE — 36415 COLL VENOUS BLD VENIPUNCTURE: CPT

## 2020-11-19 PROCEDURE — 36569 INSJ PICC 5 YR+ W/O IMAGING: CPT

## 2020-11-19 PROCEDURE — 71045 X-RAY EXAM CHEST 1 VIEW: CPT

## 2020-11-19 PROCEDURE — 76937 US GUIDE VASCULAR ACCESS: CPT

## 2020-11-19 PROCEDURE — 85610 PROTHROMBIN TIME: CPT

## 2020-11-24 ENCOUNTER — HOSPITAL ENCOUNTER (OUTPATIENT)
Dept: HOSPITAL 90 - WHH | Age: 65
Discharge: HOME | End: 2020-11-24
Attending: FAMILY MEDICINE
Payer: MEDICARE

## 2020-11-24 DIAGNOSIS — M16.11: ICD-10-CM

## 2020-11-24 DIAGNOSIS — I87.333: Primary | ICD-10-CM

## 2020-11-24 DIAGNOSIS — L97.222: ICD-10-CM

## 2020-11-24 DIAGNOSIS — Z79.01: ICD-10-CM

## 2020-11-24 DIAGNOSIS — L97.212: ICD-10-CM

## 2020-11-24 DIAGNOSIS — E11.622: ICD-10-CM

## 2020-11-24 DIAGNOSIS — E66.01: ICD-10-CM

## 2020-11-24 DIAGNOSIS — I87.2: ICD-10-CM

## 2020-11-24 DIAGNOSIS — I89.0: ICD-10-CM

## 2020-11-24 PROCEDURE — 11045 DBRDMT SUBQ TISS EACH ADDL: CPT

## 2020-11-24 PROCEDURE — 11042 DBRDMT SUBQ TIS 1ST 20SQCM/<: CPT

## 2020-12-01 ENCOUNTER — HOSPITAL ENCOUNTER (OUTPATIENT)
Dept: HOSPITAL 90 - WHH | Age: 65
Discharge: HOME | End: 2020-12-01
Attending: FAMILY MEDICINE
Payer: MEDICARE

## 2020-12-01 DIAGNOSIS — Z79.01: ICD-10-CM

## 2020-12-01 DIAGNOSIS — E11.622: ICD-10-CM

## 2020-12-01 DIAGNOSIS — I89.0: ICD-10-CM

## 2020-12-01 DIAGNOSIS — L97.222: ICD-10-CM

## 2020-12-01 DIAGNOSIS — I87.2: ICD-10-CM

## 2020-12-01 DIAGNOSIS — M16.11: ICD-10-CM

## 2020-12-01 DIAGNOSIS — L97.212: ICD-10-CM

## 2020-12-01 DIAGNOSIS — E66.01: ICD-10-CM

## 2020-12-01 DIAGNOSIS — I87.333: Primary | ICD-10-CM

## 2020-12-01 PROCEDURE — 11045 DBRDMT SUBQ TISS EACH ADDL: CPT

## 2020-12-01 PROCEDURE — 11042 DBRDMT SUBQ TIS 1ST 20SQCM/<: CPT

## 2020-12-08 ENCOUNTER — HOSPITAL ENCOUNTER (OUTPATIENT)
Dept: HOSPITAL 90 - WHH | Age: 65
Discharge: HOME | End: 2020-12-08
Attending: FAMILY MEDICINE
Payer: MEDICARE

## 2020-12-08 DIAGNOSIS — L97.212: ICD-10-CM

## 2020-12-08 DIAGNOSIS — Z79.01: ICD-10-CM

## 2020-12-08 DIAGNOSIS — E11.622: ICD-10-CM

## 2020-12-08 DIAGNOSIS — I87.333: Primary | ICD-10-CM

## 2020-12-08 DIAGNOSIS — I87.2: ICD-10-CM

## 2020-12-08 DIAGNOSIS — L97.221: ICD-10-CM

## 2020-12-08 DIAGNOSIS — I89.0: ICD-10-CM

## 2020-12-08 DIAGNOSIS — E66.01: ICD-10-CM

## 2020-12-08 DIAGNOSIS — M16.11: ICD-10-CM

## 2020-12-08 PROCEDURE — 11042 DBRDMT SUBQ TIS 1ST 20SQCM/<: CPT

## 2020-12-29 ENCOUNTER — HOSPITAL ENCOUNTER (OUTPATIENT)
Dept: HOSPITAL 90 - WHH | Age: 65
Discharge: HOME | End: 2020-12-29
Attending: FAMILY MEDICINE
Payer: MEDICARE

## 2020-12-29 DIAGNOSIS — M16.11: ICD-10-CM

## 2020-12-29 DIAGNOSIS — L97.212: ICD-10-CM

## 2020-12-29 DIAGNOSIS — E66.01: ICD-10-CM

## 2020-12-29 DIAGNOSIS — I87.333: Primary | ICD-10-CM

## 2020-12-29 DIAGNOSIS — E11.622: ICD-10-CM

## 2020-12-29 DIAGNOSIS — I89.0: ICD-10-CM

## 2020-12-29 DIAGNOSIS — L97.221: ICD-10-CM

## 2020-12-29 DIAGNOSIS — I87.2: ICD-10-CM

## 2020-12-29 DIAGNOSIS — Z79.01: ICD-10-CM

## 2021-01-05 ENCOUNTER — HOSPITAL ENCOUNTER (OUTPATIENT)
Dept: HOSPITAL 90 - WHH | Age: 66
Discharge: HOME | End: 2021-01-05
Attending: FAMILY MEDICINE
Payer: MEDICARE

## 2021-01-05 DIAGNOSIS — I89.0: ICD-10-CM

## 2021-01-05 DIAGNOSIS — E66.01: ICD-10-CM

## 2021-01-05 DIAGNOSIS — L97.212: ICD-10-CM

## 2021-01-05 DIAGNOSIS — M16.11: ICD-10-CM

## 2021-01-05 DIAGNOSIS — Z79.01: ICD-10-CM

## 2021-01-05 DIAGNOSIS — L97.221: ICD-10-CM

## 2021-01-05 DIAGNOSIS — I87.2: ICD-10-CM

## 2021-01-05 DIAGNOSIS — E11.622: ICD-10-CM

## 2021-01-05 DIAGNOSIS — I87.333: Primary | ICD-10-CM

## 2021-01-12 ENCOUNTER — HOSPITAL ENCOUNTER (OUTPATIENT)
Dept: HOSPITAL 90 - WHH | Age: 66
Discharge: HOME | End: 2021-01-12
Attending: FAMILY MEDICINE
Payer: MEDICARE

## 2021-01-12 DIAGNOSIS — I87.333: Primary | ICD-10-CM

## 2021-01-12 DIAGNOSIS — M16.11: ICD-10-CM

## 2021-01-12 DIAGNOSIS — I87.2: ICD-10-CM

## 2021-01-12 DIAGNOSIS — L97.221: ICD-10-CM

## 2021-01-12 DIAGNOSIS — E66.01: ICD-10-CM

## 2021-01-12 DIAGNOSIS — E11.622: ICD-10-CM

## 2021-01-12 DIAGNOSIS — Z79.01: ICD-10-CM

## 2021-01-12 DIAGNOSIS — I89.0: ICD-10-CM

## 2021-01-12 DIAGNOSIS — L97.212: ICD-10-CM

## 2021-01-12 PROCEDURE — 87186 SC STD MICRODIL/AGAR DIL: CPT

## 2021-01-12 PROCEDURE — 87077 CULTURE AEROBIC IDENTIFY: CPT

## 2021-01-12 PROCEDURE — 11042 DBRDMT SUBQ TIS 1ST 20SQCM/<: CPT

## 2021-01-12 PROCEDURE — 87070 CULTURE OTHR SPECIMN AEROBIC: CPT

## 2021-01-28 ENCOUNTER — HOSPITAL ENCOUNTER (OUTPATIENT)
Dept: HOSPITAL 90 - WHH | Age: 66
Discharge: HOME | End: 2021-01-28
Attending: FAMILY MEDICINE
Payer: MEDICARE

## 2021-01-28 DIAGNOSIS — M16.11: ICD-10-CM

## 2021-01-28 DIAGNOSIS — I89.0: ICD-10-CM

## 2021-01-28 DIAGNOSIS — L97.221: ICD-10-CM

## 2021-01-28 DIAGNOSIS — Z79.01: ICD-10-CM

## 2021-01-28 DIAGNOSIS — I87.2: ICD-10-CM

## 2021-01-28 DIAGNOSIS — E66.01: ICD-10-CM

## 2021-01-28 DIAGNOSIS — E11.622: ICD-10-CM

## 2021-01-28 DIAGNOSIS — L97.212: ICD-10-CM

## 2021-01-28 DIAGNOSIS — I87.333: Primary | ICD-10-CM

## 2021-01-28 PROCEDURE — 11042 DBRDMT SUBQ TIS 1ST 20SQCM/<: CPT

## 2021-02-04 ENCOUNTER — HOSPITAL ENCOUNTER (OUTPATIENT)
Dept: HOSPITAL 90 - WHH | Age: 66
Discharge: HOME | End: 2021-02-04
Attending: FAMILY MEDICINE
Payer: MEDICARE

## 2021-02-04 DIAGNOSIS — I87.333: Primary | ICD-10-CM

## 2021-02-04 DIAGNOSIS — M16.11: ICD-10-CM

## 2021-02-04 DIAGNOSIS — Z79.01: ICD-10-CM

## 2021-02-04 DIAGNOSIS — L97.212: ICD-10-CM

## 2021-02-04 DIAGNOSIS — I87.2: ICD-10-CM

## 2021-02-04 DIAGNOSIS — E11.622: ICD-10-CM

## 2021-02-04 DIAGNOSIS — I89.0: ICD-10-CM

## 2021-02-04 DIAGNOSIS — L97.221: ICD-10-CM

## 2021-02-04 DIAGNOSIS — E66.01: ICD-10-CM

## 2021-02-04 PROCEDURE — 11042 DBRDMT SUBQ TIS 1ST 20SQCM/<: CPT

## 2021-02-18 ENCOUNTER — HOSPITAL ENCOUNTER (OUTPATIENT)
Dept: HOSPITAL 90 - WHH | Age: 66
Discharge: HOME | End: 2021-02-18
Attending: FAMILY MEDICINE
Payer: MEDICARE

## 2021-02-18 DIAGNOSIS — M16.11: ICD-10-CM

## 2021-02-18 DIAGNOSIS — Z79.01: ICD-10-CM

## 2021-02-18 DIAGNOSIS — I87.333: Primary | ICD-10-CM

## 2021-02-18 DIAGNOSIS — E11.622: ICD-10-CM

## 2021-02-18 DIAGNOSIS — L97.212: ICD-10-CM

## 2021-02-18 DIAGNOSIS — L97.221: ICD-10-CM

## 2021-02-18 DIAGNOSIS — I87.2: ICD-10-CM

## 2021-02-18 DIAGNOSIS — I89.0: ICD-10-CM

## 2021-02-18 DIAGNOSIS — E66.01: ICD-10-CM

## 2021-02-18 PROCEDURE — 11042 DBRDMT SUBQ TIS 1ST 20SQCM/<: CPT

## 2021-02-25 ENCOUNTER — HOSPITAL ENCOUNTER (OUTPATIENT)
Dept: HOSPITAL 90 - WHH | Age: 66
Discharge: HOME | End: 2021-02-25
Attending: FAMILY MEDICINE
Payer: MEDICARE

## 2021-02-25 DIAGNOSIS — E66.01: ICD-10-CM

## 2021-02-25 DIAGNOSIS — L97.212: ICD-10-CM

## 2021-02-25 DIAGNOSIS — I89.0: ICD-10-CM

## 2021-02-25 DIAGNOSIS — I87.2: ICD-10-CM

## 2021-02-25 DIAGNOSIS — I87.333: Primary | ICD-10-CM

## 2021-02-25 DIAGNOSIS — M16.11: ICD-10-CM

## 2021-02-25 DIAGNOSIS — E11.622: ICD-10-CM

## 2021-02-25 DIAGNOSIS — L97.221: ICD-10-CM

## 2021-02-25 DIAGNOSIS — Z79.01: ICD-10-CM

## 2021-02-25 PROCEDURE — 11042 DBRDMT SUBQ TIS 1ST 20SQCM/<: CPT

## 2021-03-04 ENCOUNTER — HOSPITAL ENCOUNTER (OUTPATIENT)
Dept: HOSPITAL 90 - WHH | Age: 66
Discharge: HOME | End: 2021-03-04
Attending: FAMILY MEDICINE
Payer: MEDICARE

## 2021-03-04 DIAGNOSIS — E66.01: ICD-10-CM

## 2021-03-04 DIAGNOSIS — I87.333: Primary | ICD-10-CM

## 2021-03-04 DIAGNOSIS — L97.212: ICD-10-CM

## 2021-03-04 DIAGNOSIS — I89.0: ICD-10-CM

## 2021-03-04 DIAGNOSIS — E11.622: ICD-10-CM

## 2021-03-04 DIAGNOSIS — Z79.01: ICD-10-CM

## 2021-03-04 DIAGNOSIS — M16.11: ICD-10-CM

## 2021-03-04 DIAGNOSIS — L97.221: ICD-10-CM

## 2021-03-04 DIAGNOSIS — I87.2: ICD-10-CM

## 2021-03-04 PROCEDURE — 11042 DBRDMT SUBQ TIS 1ST 20SQCM/<: CPT

## 2021-03-11 ENCOUNTER — HOSPITAL ENCOUNTER (OUTPATIENT)
Dept: HOSPITAL 90 - WHH | Age: 66
Discharge: HOME | End: 2021-03-11
Attending: INTERNAL MEDICINE
Payer: MEDICARE

## 2021-03-11 DIAGNOSIS — M16.11: ICD-10-CM

## 2021-03-11 DIAGNOSIS — Z79.01: ICD-10-CM

## 2021-03-11 DIAGNOSIS — I87.333: Primary | ICD-10-CM

## 2021-03-11 DIAGNOSIS — L97.221: ICD-10-CM

## 2021-03-11 DIAGNOSIS — L97.212: ICD-10-CM

## 2021-03-11 DIAGNOSIS — I87.2: ICD-10-CM

## 2021-03-11 DIAGNOSIS — I89.0: ICD-10-CM

## 2021-03-11 DIAGNOSIS — E11.622: ICD-10-CM

## 2021-03-11 DIAGNOSIS — E66.01: ICD-10-CM

## 2021-03-11 PROCEDURE — 88305 TISSUE EXAM BY PATHOLOGIST: CPT

## 2021-03-11 PROCEDURE — 11042 DBRDMT SUBQ TIS 1ST 20SQCM/<: CPT

## 2021-03-11 PROCEDURE — 11104 PUNCH BX SKIN SINGLE LESION: CPT

## 2021-03-11 PROCEDURE — 88312 SPECIAL STAINS GROUP 1: CPT

## 2021-03-18 ENCOUNTER — HOSPITAL ENCOUNTER (OUTPATIENT)
Dept: HOSPITAL 90 - WHH | Age: 66
Discharge: HOME | End: 2021-03-18
Attending: FAMILY MEDICINE
Payer: MEDICARE

## 2021-03-18 DIAGNOSIS — E11.622: ICD-10-CM

## 2021-03-18 DIAGNOSIS — L97.212: ICD-10-CM

## 2021-03-18 DIAGNOSIS — I87.333: Primary | ICD-10-CM

## 2021-03-18 DIAGNOSIS — Z79.01: ICD-10-CM

## 2021-03-18 DIAGNOSIS — I89.0: ICD-10-CM

## 2021-03-18 DIAGNOSIS — L97.221: ICD-10-CM

## 2021-03-18 DIAGNOSIS — M16.11: ICD-10-CM

## 2021-03-18 DIAGNOSIS — I87.2: ICD-10-CM

## 2021-03-18 DIAGNOSIS — E66.01: ICD-10-CM

## 2021-03-18 PROCEDURE — 11042 DBRDMT SUBQ TIS 1ST 20SQCM/<: CPT

## 2021-03-25 ENCOUNTER — HOSPITAL ENCOUNTER (OUTPATIENT)
Dept: HOSPITAL 90 - WHH | Age: 66
Discharge: HOME | End: 2021-03-25
Attending: FAMILY MEDICINE
Payer: MEDICARE

## 2021-03-25 DIAGNOSIS — M16.11: ICD-10-CM

## 2021-03-25 DIAGNOSIS — I89.0: ICD-10-CM

## 2021-03-25 DIAGNOSIS — I87.2: ICD-10-CM

## 2021-03-25 DIAGNOSIS — E66.01: ICD-10-CM

## 2021-03-25 DIAGNOSIS — L97.212: ICD-10-CM

## 2021-03-25 DIAGNOSIS — Z79.01: ICD-10-CM

## 2021-03-25 DIAGNOSIS — I87.333: Primary | ICD-10-CM

## 2021-03-25 DIAGNOSIS — E11.622: ICD-10-CM

## 2021-03-25 DIAGNOSIS — L97.221: ICD-10-CM

## 2021-03-25 PROCEDURE — 11042 DBRDMT SUBQ TIS 1ST 20SQCM/<: CPT

## 2021-04-01 ENCOUNTER — HOSPITAL ENCOUNTER (OUTPATIENT)
Dept: HOSPITAL 90 - WHH | Age: 66
Discharge: HOME | End: 2021-04-01
Attending: FAMILY MEDICINE
Payer: MEDICARE

## 2021-04-01 DIAGNOSIS — M16.11: ICD-10-CM

## 2021-04-01 DIAGNOSIS — E11.622: ICD-10-CM

## 2021-04-01 DIAGNOSIS — E66.01: ICD-10-CM

## 2021-04-01 DIAGNOSIS — I87.333: Primary | ICD-10-CM

## 2021-04-01 DIAGNOSIS — I89.0: ICD-10-CM

## 2021-04-01 DIAGNOSIS — Z79.01: ICD-10-CM

## 2021-04-01 DIAGNOSIS — L97.222: ICD-10-CM

## 2021-04-01 DIAGNOSIS — L97.212: ICD-10-CM

## 2021-04-01 PROCEDURE — 11042 DBRDMT SUBQ TIS 1ST 20SQCM/<: CPT

## 2021-04-01 PROCEDURE — 97605 NEG PRS WND THER DME<=50SQCM: CPT

## 2021-04-08 ENCOUNTER — HOSPITAL ENCOUNTER (OUTPATIENT)
Dept: HOSPITAL 90 - WHH | Age: 66
Discharge: HOME | End: 2021-04-08
Attending: FAMILY MEDICINE
Payer: MEDICARE

## 2021-04-08 DIAGNOSIS — L97.212: ICD-10-CM

## 2021-04-08 DIAGNOSIS — M16.11: ICD-10-CM

## 2021-04-08 DIAGNOSIS — I89.0: ICD-10-CM

## 2021-04-08 DIAGNOSIS — E11.622: ICD-10-CM

## 2021-04-08 DIAGNOSIS — E66.01: ICD-10-CM

## 2021-04-08 DIAGNOSIS — Z79.01: ICD-10-CM

## 2021-04-08 DIAGNOSIS — L97.222: ICD-10-CM

## 2021-04-08 DIAGNOSIS — I87.333: Primary | ICD-10-CM

## 2021-04-08 PROCEDURE — 87077 CULTURE AEROBIC IDENTIFY: CPT

## 2021-04-08 PROCEDURE — 87070 CULTURE OTHR SPECIMN AEROBIC: CPT

## 2021-04-08 PROCEDURE — 11042 DBRDMT SUBQ TIS 1ST 20SQCM/<: CPT

## 2021-04-08 PROCEDURE — 87186 SC STD MICRODIL/AGAR DIL: CPT

## 2021-04-15 ENCOUNTER — HOSPITAL ENCOUNTER (OUTPATIENT)
Dept: HOSPITAL 90 - WHH | Age: 66
Discharge: HOME | End: 2021-04-15
Attending: FAMILY MEDICINE
Payer: MEDICARE

## 2021-04-15 DIAGNOSIS — Z79.01: ICD-10-CM

## 2021-04-15 DIAGNOSIS — L97.212: ICD-10-CM

## 2021-04-15 DIAGNOSIS — I89.0: ICD-10-CM

## 2021-04-15 DIAGNOSIS — M16.11: ICD-10-CM

## 2021-04-15 DIAGNOSIS — L97.222: ICD-10-CM

## 2021-04-15 DIAGNOSIS — I87.333: Primary | ICD-10-CM

## 2021-04-15 DIAGNOSIS — E11.622: ICD-10-CM

## 2021-04-15 DIAGNOSIS — E66.01: ICD-10-CM

## 2021-04-15 PROCEDURE — 11042 DBRDMT SUBQ TIS 1ST 20SQCM/<: CPT

## 2021-04-22 ENCOUNTER — HOSPITAL ENCOUNTER (OUTPATIENT)
Dept: HOSPITAL 90 - WHH | Age: 66
Discharge: HOME | End: 2021-04-22
Attending: FAMILY MEDICINE
Payer: MEDICARE

## 2021-04-22 DIAGNOSIS — L97.212: ICD-10-CM

## 2021-04-22 DIAGNOSIS — M16.11: ICD-10-CM

## 2021-04-22 DIAGNOSIS — I89.0: ICD-10-CM

## 2021-04-22 DIAGNOSIS — L97.222: ICD-10-CM

## 2021-04-22 DIAGNOSIS — E66.01: ICD-10-CM

## 2021-04-22 DIAGNOSIS — Z79.01: ICD-10-CM

## 2021-04-22 DIAGNOSIS — I87.333: Primary | ICD-10-CM

## 2021-04-22 DIAGNOSIS — E11.622: ICD-10-CM

## 2021-04-22 PROCEDURE — 11042 DBRDMT SUBQ TIS 1ST 20SQCM/<: CPT

## 2021-04-29 ENCOUNTER — HOSPITAL ENCOUNTER (OUTPATIENT)
Dept: HOSPITAL 90 - WHH | Age: 66
Discharge: HOME | End: 2021-04-29
Attending: FAMILY MEDICINE
Payer: MEDICARE

## 2021-04-29 DIAGNOSIS — L97.222: ICD-10-CM

## 2021-04-29 DIAGNOSIS — M16.11: ICD-10-CM

## 2021-04-29 DIAGNOSIS — E11.622: ICD-10-CM

## 2021-04-29 DIAGNOSIS — E66.01: ICD-10-CM

## 2021-04-29 DIAGNOSIS — I89.0: ICD-10-CM

## 2021-04-29 DIAGNOSIS — I87.333: Primary | ICD-10-CM

## 2021-04-29 DIAGNOSIS — L97.212: ICD-10-CM

## 2021-04-29 DIAGNOSIS — Z79.01: ICD-10-CM

## 2021-04-29 PROCEDURE — 11042 DBRDMT SUBQ TIS 1ST 20SQCM/<: CPT

## 2021-07-20 ENCOUNTER — OFFICE VISIT (OUTPATIENT)
Dept: WOUND CARE | Facility: HOSPITAL | Age: 66
End: 2021-07-20

## 2021-07-20 ENCOUNTER — TRANSCRIBE ORDERS (OUTPATIENT)
Dept: ADMINISTRATIVE | Facility: HOSPITAL | Age: 66
End: 2021-07-20

## 2021-07-20 ENCOUNTER — LAB REQUISITION (OUTPATIENT)
Dept: LAB | Facility: HOSPITAL | Age: 66
End: 2021-07-20

## 2021-07-20 DIAGNOSIS — I10 ESSENTIAL (PRIMARY) HYPERTENSION: ICD-10-CM

## 2021-07-20 DIAGNOSIS — R60.0 LOCALIZED EDEMA: Primary | ICD-10-CM

## 2021-07-20 DIAGNOSIS — L97.812 NON-PRESSURE CHRONIC ULCER OF OTHER PART OF RIGHT LOWER LEG WITH FAT LAYER EXPOSED (HCC): ICD-10-CM

## 2021-07-20 DIAGNOSIS — L97.822 NON-PRESSURE CHRONIC ULCER OF OTHER PART OF LEFT LOWER LEG WITH FAT LAYER EXPOSED (HCC): ICD-10-CM

## 2021-07-20 DIAGNOSIS — Z00.00 ENCOUNTER FOR GENERAL ADULT MEDICAL EXAMINATION WITHOUT ABNORMAL FINDINGS: ICD-10-CM

## 2021-07-20 PROCEDURE — 11042 DBRDMT SUBQ TIS 1ST 20SQCM/<: CPT | Performed by: SURGERY

## 2021-07-20 PROCEDURE — 87147 CULTURE TYPE IMMUNOLOGIC: CPT | Performed by: SURGERY

## 2021-07-20 PROCEDURE — 87176 TISSUE HOMOGENIZATION CULTR: CPT | Performed by: SURGERY

## 2021-07-20 PROCEDURE — 87075 CULTR BACTERIA EXCEPT BLOOD: CPT | Performed by: SURGERY

## 2021-07-20 PROCEDURE — G0463 HOSPITAL OUTPT CLINIC VISIT: HCPCS

## 2021-07-20 PROCEDURE — 87070 CULTURE OTHR SPECIMN AEROBIC: CPT | Performed by: SURGERY

## 2021-07-20 PROCEDURE — 87205 SMEAR GRAM STAIN: CPT | Performed by: SURGERY

## 2021-07-20 PROCEDURE — 87077 CULTURE AEROBIC IDENTIFY: CPT | Performed by: SURGERY

## 2021-07-20 PROCEDURE — 99204 OFFICE O/P NEW MOD 45 MIN: CPT | Performed by: SURGERY

## 2021-07-20 PROCEDURE — 11045 DBRDMT SUBQ TISS EACH ADDL: CPT | Performed by: SURGERY

## 2021-07-20 PROCEDURE — 87186 SC STD MICRODIL/AGAR DIL: CPT | Performed by: SURGERY

## 2021-07-23 LAB
BACTERIA SPEC AEROBE CULT: ABNORMAL
GRAM STN SPEC: ABNORMAL

## 2021-07-25 LAB — BACTERIA SPEC ANAEROBE CULT: NORMAL

## 2021-07-27 ENCOUNTER — OFFICE VISIT (OUTPATIENT)
Dept: WOUND CARE | Facility: HOSPITAL | Age: 66
End: 2021-07-27

## 2021-07-27 DIAGNOSIS — L97.812 NON-PRESSURE CHRONIC ULCER OF OTHER PART OF RIGHT LOWER LEG WITH FAT LAYER EXPOSED (HCC): ICD-10-CM

## 2021-07-27 DIAGNOSIS — L97.822 NON-PRESSURE CHRONIC ULCER OF OTHER PART OF LEFT LOWER LEG WITH FAT LAYER EXPOSED (HCC): ICD-10-CM

## 2021-07-27 DIAGNOSIS — I10 ESSENTIAL (PRIMARY) HYPERTENSION: ICD-10-CM

## 2021-07-27 PROCEDURE — 11045 DBRDMT SUBQ TISS EACH ADDL: CPT | Performed by: SURGERY

## 2021-07-27 PROCEDURE — 11042 DBRDMT SUBQ TIS 1ST 20SQCM/<: CPT | Performed by: SURGERY

## 2021-08-03 ENCOUNTER — OFFICE VISIT (OUTPATIENT)
Dept: WOUND CARE | Facility: HOSPITAL | Age: 66
End: 2021-08-03

## 2021-08-03 DIAGNOSIS — I10 ESSENTIAL (PRIMARY) HYPERTENSION: ICD-10-CM

## 2021-08-03 DIAGNOSIS — L97.822 NON-PRESSURE CHRONIC ULCER OF OTHER PART OF LEFT LOWER LEG WITH FAT LAYER EXPOSED (HCC): ICD-10-CM

## 2021-08-03 DIAGNOSIS — L97.812 NON-PRESSURE CHRONIC ULCER OF OTHER PART OF RIGHT LOWER LEG WITH FAT LAYER EXPOSED (HCC): ICD-10-CM

## 2021-08-03 PROCEDURE — 11042 DBRDMT SUBQ TIS 1ST 20SQCM/<: CPT | Performed by: SURGERY

## 2021-08-03 PROCEDURE — 11045 DBRDMT SUBQ TISS EACH ADDL: CPT | Performed by: SURGERY

## 2021-08-10 ENCOUNTER — OFFICE VISIT (OUTPATIENT)
Dept: WOUND CARE | Facility: HOSPITAL | Age: 66
End: 2021-08-10

## 2021-08-10 DIAGNOSIS — I10 ESSENTIAL (PRIMARY) HYPERTENSION: ICD-10-CM

## 2021-08-10 DIAGNOSIS — L97.812 NON-PRESSURE CHRONIC ULCER OF OTHER PART OF RIGHT LOWER LEG WITH FAT LAYER EXPOSED (HCC): ICD-10-CM

## 2021-08-10 DIAGNOSIS — L97.822 NON-PRESSURE CHRONIC ULCER OF OTHER PART OF LEFT LOWER LEG WITH FAT LAYER EXPOSED (HCC): ICD-10-CM

## 2021-08-10 PROCEDURE — 11045 DBRDMT SUBQ TISS EACH ADDL: CPT | Performed by: SURGERY

## 2021-08-10 PROCEDURE — 11042 DBRDMT SUBQ TIS 1ST 20SQCM/<: CPT | Performed by: SURGERY

## 2021-08-17 ENCOUNTER — OFFICE VISIT (OUTPATIENT)
Dept: WOUND CARE | Facility: HOSPITAL | Age: 66
End: 2021-08-17

## 2021-08-17 DIAGNOSIS — L97.822 NON-PRESSURE CHRONIC ULCER OF OTHER PART OF LEFT LOWER LEG WITH FAT LAYER EXPOSED (HCC): ICD-10-CM

## 2021-08-17 DIAGNOSIS — L97.812 NON-PRESSURE CHRONIC ULCER OF OTHER PART OF RIGHT LOWER LEG WITH FAT LAYER EXPOSED (HCC): ICD-10-CM

## 2021-08-17 DIAGNOSIS — I10 ESSENTIAL (PRIMARY) HYPERTENSION: ICD-10-CM

## 2021-08-17 PROCEDURE — 11045 DBRDMT SUBQ TISS EACH ADDL: CPT | Performed by: SURGERY

## 2021-08-17 PROCEDURE — 11042 DBRDMT SUBQ TIS 1ST 20SQCM/<: CPT | Performed by: SURGERY

## 2021-08-18 ENCOUNTER — HOSPITAL ENCOUNTER (OUTPATIENT)
Dept: ULTRASOUND IMAGING | Facility: HOSPITAL | Age: 66
Discharge: HOME | End: 2021-08-18
Admitting: SURGERY

## 2021-08-18 DIAGNOSIS — R60.0 LOCALIZED EDEMA: ICD-10-CM

## 2021-08-18 PROCEDURE — 93970 EXTREMITY STUDY: CPT

## 2021-08-18 PROCEDURE — 93970 EXTREMITY STUDY: CPT | Performed by: SURGERY

## 2021-08-24 ENCOUNTER — OFFICE VISIT (OUTPATIENT)
Dept: WOUND CARE | Facility: HOSPITAL | Age: 66
End: 2021-08-24

## 2021-08-24 DIAGNOSIS — L97.812 NON-PRESSURE CHRONIC ULCER OF OTHER PART OF RIGHT LOWER LEG WITH FAT LAYER EXPOSED (HCC): ICD-10-CM

## 2021-08-24 DIAGNOSIS — I10 ESSENTIAL (PRIMARY) HYPERTENSION: ICD-10-CM

## 2021-08-24 DIAGNOSIS — L97.822 NON-PRESSURE CHRONIC ULCER OF OTHER PART OF LEFT LOWER LEG WITH FAT LAYER EXPOSED (HCC): ICD-10-CM

## 2021-08-24 PROCEDURE — 11042 DBRDMT SUBQ TIS 1ST 20SQCM/<: CPT | Performed by: NURSE PRACTITIONER

## 2021-08-24 PROCEDURE — 11045 DBRDMT SUBQ TISS EACH ADDL: CPT | Performed by: NURSE PRACTITIONER

## 2021-08-31 ENCOUNTER — OFFICE VISIT (OUTPATIENT)
Dept: WOUND CARE | Facility: HOSPITAL | Age: 66
End: 2021-08-31

## 2021-08-31 DIAGNOSIS — L97.822 NON-PRESSURE CHRONIC ULCER OF OTHER PART OF LEFT LOWER LEG WITH FAT LAYER EXPOSED (HCC): ICD-10-CM

## 2021-08-31 DIAGNOSIS — I10 ESSENTIAL (PRIMARY) HYPERTENSION: ICD-10-CM

## 2021-08-31 DIAGNOSIS — L97.812 NON-PRESSURE CHRONIC ULCER OF OTHER PART OF RIGHT LOWER LEG WITH FAT LAYER EXPOSED (HCC): ICD-10-CM

## 2021-08-31 PROCEDURE — 11045 DBRDMT SUBQ TISS EACH ADDL: CPT | Performed by: SURGERY

## 2021-08-31 PROCEDURE — 11042 DBRDMT SUBQ TIS 1ST 20SQCM/<: CPT | Performed by: SURGERY

## 2021-09-30 ENCOUNTER — HOSPITAL ENCOUNTER (OUTPATIENT)
Dept: HOSPITAL 90 - WHH | Age: 66
Discharge: HOME | End: 2021-09-30
Attending: FAMILY MEDICINE
Payer: MEDICARE

## 2021-09-30 DIAGNOSIS — I87.322: ICD-10-CM

## 2021-09-30 DIAGNOSIS — Z79.01: ICD-10-CM

## 2021-09-30 DIAGNOSIS — E66.01: ICD-10-CM

## 2021-09-30 DIAGNOSIS — Z72.89: ICD-10-CM

## 2021-09-30 DIAGNOSIS — I87.331: Primary | ICD-10-CM

## 2021-09-30 DIAGNOSIS — M16.11: ICD-10-CM

## 2021-09-30 DIAGNOSIS — E11.622: ICD-10-CM

## 2021-09-30 DIAGNOSIS — Z79.899: ICD-10-CM

## 2021-09-30 DIAGNOSIS — L97.812: ICD-10-CM

## 2021-09-30 DIAGNOSIS — I87.2: ICD-10-CM

## 2021-09-30 DIAGNOSIS — L97.212: ICD-10-CM

## 2021-09-30 DIAGNOSIS — L03.115: ICD-10-CM

## 2021-09-30 PROCEDURE — 87186 SC STD MICRODIL/AGAR DIL: CPT

## 2021-09-30 PROCEDURE — 11042 DBRDMT SUBQ TIS 1ST 20SQCM/<: CPT

## 2021-09-30 PROCEDURE — 87077 CULTURE AEROBIC IDENTIFY: CPT

## 2021-09-30 PROCEDURE — 87070 CULTURE OTHR SPECIMN AEROBIC: CPT

## 2021-09-30 PROCEDURE — 11045 DBRDMT SUBQ TISS EACH ADDL: CPT

## 2021-09-30 PROCEDURE — 88312 SPECIAL STAINS GROUP 1: CPT

## 2021-09-30 PROCEDURE — 11104 PUNCH BX SKIN SINGLE LESION: CPT

## 2021-09-30 PROCEDURE — 88305 TISSUE EXAM BY PATHOLOGIST: CPT

## 2021-10-07 ENCOUNTER — HOSPITAL ENCOUNTER (OUTPATIENT)
Dept: HOSPITAL 90 - WHH | Age: 66
Discharge: HOME | End: 2021-10-07
Attending: FAMILY MEDICINE
Payer: MEDICARE

## 2021-10-07 DIAGNOSIS — I89.0: ICD-10-CM

## 2021-10-07 DIAGNOSIS — Z79.01: ICD-10-CM
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
## 2021-10-07 DIAGNOSIS — M16.11: ICD-10-CM

## 2021-10-07 DIAGNOSIS — I87.331: ICD-10-CM

## 2021-10-07 DIAGNOSIS — T81.89XA: Primary | ICD-10-CM

## 2021-10-07 DIAGNOSIS — L03.115: ICD-10-CM
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
## 2021-10-07 DIAGNOSIS — I87.322: ICD-10-CM

## 2021-10-07 DIAGNOSIS — E11.622: ICD-10-CM

## 2021-10-07 DIAGNOSIS — L97.212: ICD-10-CM

## 2021-10-07 DIAGNOSIS — E66.01: ICD-10-CM

## 2021-10-07 PROCEDURE — 11045 DBRDMT SUBQ TISS EACH ADDL: CPT

## 2021-10-07 PROCEDURE — 11042 DBRDMT SUBQ TIS 1ST 20SQCM/<: CPT

## 2021-10-21 ENCOUNTER — HOSPITAL ENCOUNTER (OUTPATIENT)
Dept: HOSPITAL 90 - WHH | Age: 66
Discharge: HOME | End: 2021-10-21
Attending: FAMILY MEDICINE
Payer: MEDICARE

## 2021-10-21 DIAGNOSIS — M16.11: ICD-10-CM

## 2021-10-21 DIAGNOSIS — I87.2: ICD-10-CM

## 2021-10-21 DIAGNOSIS — L03.115: ICD-10-CM
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
## 2021-10-21 DIAGNOSIS — I87.322: ICD-10-CM

## 2021-10-21 DIAGNOSIS — L97.212: ICD-10-CM

## 2021-10-21 DIAGNOSIS — I87.331: ICD-10-CM

## 2021-10-21 DIAGNOSIS — E66.01: ICD-10-CM

## 2021-10-21 DIAGNOSIS — T81.89XD: Primary | ICD-10-CM

## 2021-10-21 DIAGNOSIS — Z79.01: ICD-10-CM

## 2021-10-21 PROCEDURE — 15271 SKIN SUB GRAFT TRNK/ARM/LEG: CPT

## 2021-10-21 PROCEDURE — 15272 SKIN SUB GRAFT T/A/L ADD-ON: CPT

## 2021-11-08 ENCOUNTER — HOSPITAL ENCOUNTER (OUTPATIENT)
Dept: HOSPITAL 90 - WHH | Age: 66
Discharge: HOME | End: 2021-11-08
Attending: FAMILY MEDICINE
Payer: MEDICARE

## 2021-11-08 DIAGNOSIS — L97.212: ICD-10-CM

## 2021-11-08 DIAGNOSIS — I87.322: ICD-10-CM

## 2021-11-08 DIAGNOSIS — E11.622: ICD-10-CM

## 2021-11-08 DIAGNOSIS — I87.331: ICD-10-CM

## 2021-11-08 DIAGNOSIS — Z79.01: ICD-10-CM

## 2021-11-08 DIAGNOSIS — E66.01: ICD-10-CM

## 2021-11-08 DIAGNOSIS — T81.89XD: Primary | ICD-10-CM

## 2021-11-08 DIAGNOSIS — L03.115: ICD-10-CM
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
## 2021-11-08 PROCEDURE — 15271 SKIN SUB GRAFT TRNK/ARM/LEG: CPT

## 2021-11-15 ENCOUNTER — HOSPITAL ENCOUNTER (OUTPATIENT)
Dept: HOSPITAL 90 - WHH | Age: 66
Discharge: HOME | End: 2021-11-15
Attending: FAMILY MEDICINE
Payer: MEDICARE

## 2021-11-15 DIAGNOSIS — I87.322: ICD-10-CM

## 2021-11-15 DIAGNOSIS — T86.828: Primary | ICD-10-CM

## 2021-11-15 DIAGNOSIS — L97.212: ICD-10-CM

## 2021-11-15 DIAGNOSIS — L97.312: ICD-10-CM

## 2021-11-15 DIAGNOSIS — I87.331: ICD-10-CM

## 2021-11-15 DIAGNOSIS — Z79.01: ICD-10-CM

## 2021-11-15 DIAGNOSIS — L03.115: ICD-10-CM

## 2021-11-15 DIAGNOSIS — E66.01: ICD-10-CM

## 2021-11-15 DIAGNOSIS — E11.622: ICD-10-CM
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
## 2021-11-15 PROCEDURE — 15271 SKIN SUB GRAFT TRNK/ARM/LEG: CPT

## 2021-11-23 ENCOUNTER — HOSPITAL ENCOUNTER (OUTPATIENT)
Dept: HOSPITAL 90 - WHH | Age: 66
Discharge: HOME | End: 2021-11-23
Attending: FAMILY MEDICINE
Payer: MEDICARE

## 2021-11-23 DIAGNOSIS — I87.322: ICD-10-CM

## 2021-11-23 DIAGNOSIS — Z79.01: ICD-10-CM

## 2021-11-23 DIAGNOSIS — I87.331: ICD-10-CM
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
## 2021-11-23 DIAGNOSIS — E66.01: ICD-10-CM

## 2021-11-23 DIAGNOSIS — L97.212: ICD-10-CM

## 2021-11-23 DIAGNOSIS — M16.11: ICD-10-CM

## 2021-11-23 DIAGNOSIS — L03.115: ICD-10-CM

## 2021-11-23 DIAGNOSIS — E11.622: ICD-10-CM

## 2021-11-23 DIAGNOSIS — T81.89XD: Primary | ICD-10-CM

## 2021-11-23 PROCEDURE — 15271 SKIN SUB GRAFT TRNK/ARM/LEG: CPT

## 2021-11-23 PROCEDURE — 15272 SKIN SUB GRAFT T/A/L ADD-ON: CPT

## 2021-11-30 ENCOUNTER — HOSPITAL ENCOUNTER (OUTPATIENT)
Dept: HOSPITAL 90 - WHH | Age: 66
Discharge: HOME | End: 2021-11-30
Attending: FAMILY MEDICINE
Payer: MEDICARE

## 2021-11-30 DIAGNOSIS — E66.01: ICD-10-CM

## 2021-11-30 DIAGNOSIS — L97.212: ICD-10-CM

## 2021-11-30 DIAGNOSIS — M16.11: ICD-10-CM
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
## 2021-11-30 DIAGNOSIS — I87.331: ICD-10-CM

## 2021-11-30 DIAGNOSIS — E11.622: ICD-10-CM

## 2021-11-30 DIAGNOSIS — I87.322: ICD-10-CM

## 2021-11-30 DIAGNOSIS — T81.89XD: Primary | ICD-10-CM

## 2021-11-30 DIAGNOSIS — L03.115: ICD-10-CM

## 2021-11-30 DIAGNOSIS — Z79.01: ICD-10-CM

## 2021-11-30 PROCEDURE — 15271 SKIN SUB GRAFT TRNK/ARM/LEG: CPT

## 2021-11-30 PROCEDURE — 15272 SKIN SUB GRAFT T/A/L ADD-ON: CPT

## 2021-12-09 ENCOUNTER — HOSPITAL ENCOUNTER (OUTPATIENT)
Dept: HOSPITAL 90 - WHH | Age: 66
Discharge: HOME | End: 2021-12-09
Attending: FAMILY MEDICINE
Payer: MEDICARE

## 2021-12-09 DIAGNOSIS — L97.212: ICD-10-CM

## 2021-12-09 DIAGNOSIS — L03.115: ICD-10-CM

## 2021-12-09 DIAGNOSIS — Z79.01: ICD-10-CM
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
## 2021-12-09 DIAGNOSIS — T86.828: Primary | ICD-10-CM

## 2021-12-09 DIAGNOSIS — I87.331: ICD-10-CM

## 2021-12-09 DIAGNOSIS — E11.622: ICD-10-CM

## 2021-12-09 DIAGNOSIS — I87.322: ICD-10-CM

## 2021-12-09 DIAGNOSIS — M16.11: ICD-10-CM

## 2021-12-09 PROCEDURE — 15272 SKIN SUB GRAFT T/A/L ADD-ON: CPT

## 2021-12-09 PROCEDURE — 15271 SKIN SUB GRAFT TRNK/ARM/LEG: CPT

## 2021-12-16 ENCOUNTER — HOSPITAL ENCOUNTER (OUTPATIENT)
Dept: HOSPITAL 90 - WHH | Age: 66
Discharge: HOME | End: 2021-12-16
Attending: FAMILY MEDICINE
Payer: MEDICARE

## 2021-12-16 DIAGNOSIS — E11.622: ICD-10-CM
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
## 2021-12-16 DIAGNOSIS — I87.331: ICD-10-CM

## 2021-12-16 DIAGNOSIS — L03.115: ICD-10-CM

## 2021-12-16 DIAGNOSIS — I87.322: ICD-10-CM

## 2021-12-16 DIAGNOSIS — M16.11: ICD-10-CM

## 2021-12-16 DIAGNOSIS — T86.828: Primary | ICD-10-CM

## 2021-12-16 DIAGNOSIS — Z79.01: ICD-10-CM

## 2021-12-16 DIAGNOSIS — L97.212: ICD-10-CM

## 2021-12-16 PROCEDURE — 15272 SKIN SUB GRAFT T/A/L ADD-ON: CPT

## 2021-12-16 PROCEDURE — 15271 SKIN SUB GRAFT TRNK/ARM/LEG: CPT

## 2021-12-23 ENCOUNTER — HOSPITAL ENCOUNTER (OUTPATIENT)
Dept: HOSPITAL 90 - WHH | Age: 66
Discharge: HOME | End: 2021-12-23
Attending: FAMILY MEDICINE
Payer: MEDICARE

## 2021-12-23 DIAGNOSIS — Z79.01: ICD-10-CM

## 2021-12-23 DIAGNOSIS — E11.622: ICD-10-CM

## 2021-12-23 DIAGNOSIS — I87.322: ICD-10-CM
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
## 2021-12-23 DIAGNOSIS — T86.828: Primary | ICD-10-CM

## 2021-12-23 DIAGNOSIS — L97.212: ICD-10-CM

## 2021-12-23 DIAGNOSIS — M16.11: ICD-10-CM

## 2021-12-23 DIAGNOSIS — E66.9: ICD-10-CM

## 2021-12-23 DIAGNOSIS — L03.115: ICD-10-CM

## 2021-12-23 DIAGNOSIS — I87.331: ICD-10-CM

## 2021-12-23 PROCEDURE — 15272 SKIN SUB GRAFT T/A/L ADD-ON: CPT

## 2021-12-23 PROCEDURE — 15271 SKIN SUB GRAFT TRNK/ARM/LEG: CPT

## 2021-12-30 ENCOUNTER — HOSPITAL ENCOUNTER (OUTPATIENT)
Dept: HOSPITAL 90 - WHH | Age: 66
Discharge: HOME | End: 2021-12-30
Attending: FAMILY MEDICINE
Payer: MEDICARE

## 2021-12-30 DIAGNOSIS — I87.331: ICD-10-CM

## 2021-12-30 DIAGNOSIS — E66.9: ICD-10-CM

## 2021-12-30 DIAGNOSIS — L97.212: ICD-10-CM

## 2021-12-30 DIAGNOSIS — M16.11: ICD-10-CM

## 2021-12-30 DIAGNOSIS — Z79.01: ICD-10-CM

## 2021-12-30 DIAGNOSIS — L03.115: ICD-10-CM

## 2021-12-30 DIAGNOSIS — T86.828: Primary | ICD-10-CM

## 2021-12-30 DIAGNOSIS — I87.322: ICD-10-CM

## 2021-12-30 DIAGNOSIS — E11.622: ICD-10-CM
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
## 2021-12-30 PROCEDURE — 15272 SKIN SUB GRAFT T/A/L ADD-ON: CPT

## 2021-12-30 PROCEDURE — 15271 SKIN SUB GRAFT TRNK/ARM/LEG: CPT

## 2022-01-07 ENCOUNTER — HOSPITAL ENCOUNTER (OUTPATIENT)
Dept: HOSPITAL 90 - WHH | Age: 67
Discharge: HOME | End: 2022-01-07
Attending: FAMILY MEDICINE
Payer: MEDICARE

## 2022-01-07 DIAGNOSIS — E66.9: ICD-10-CM

## 2022-01-07 DIAGNOSIS — I87.322: ICD-10-CM

## 2022-01-07 DIAGNOSIS — T86.828: Primary | ICD-10-CM

## 2022-01-07 DIAGNOSIS — I87.331: ICD-10-CM

## 2022-01-07 DIAGNOSIS — L97.212: ICD-10-CM
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
## 2022-01-07 DIAGNOSIS — E11.622: ICD-10-CM

## 2022-01-07 DIAGNOSIS — Z79.01: ICD-10-CM

## 2022-01-07 DIAGNOSIS — M16.11: ICD-10-CM

## 2022-01-07 PROCEDURE — 29580 STRAPPING UNNA BOOT: CPT

## 2022-01-10 ENCOUNTER — HOSPITAL ENCOUNTER (OUTPATIENT)
Dept: HOSPITAL 90 - WHH | Age: 67
Discharge: HOME | End: 2022-01-10
Attending: FAMILY MEDICINE
Payer: MEDICARE

## 2022-01-10 DIAGNOSIS — T86.828: Primary | ICD-10-CM

## 2022-01-10 DIAGNOSIS — E11.622: ICD-10-CM

## 2022-01-10 DIAGNOSIS — I87.331: ICD-10-CM

## 2022-01-10 DIAGNOSIS — I87.322: ICD-10-CM

## 2022-01-10 DIAGNOSIS — L97.212: ICD-10-CM

## 2022-01-10 DIAGNOSIS — E66.9: ICD-10-CM

## 2022-01-10 DIAGNOSIS — M16.11: ICD-10-CM
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
## 2022-01-10 DIAGNOSIS — Z79.01: ICD-10-CM

## 2022-01-10 PROCEDURE — 15271 SKIN SUB GRAFT TRNK/ARM/LEG: CPT

## 2022-01-17 ENCOUNTER — HOSPITAL ENCOUNTER (OUTPATIENT)
Dept: HOSPITAL 90 - WHH | Age: 67
Discharge: HOME | End: 2022-01-17
Attending: FAMILY MEDICINE
Payer: MEDICARE

## 2022-01-17 DIAGNOSIS — I87.322: ICD-10-CM

## 2022-01-17 DIAGNOSIS — M16.11: ICD-10-CM

## 2022-01-17 DIAGNOSIS — Z79.01: ICD-10-CM

## 2022-01-17 DIAGNOSIS — I87.331: ICD-10-CM

## 2022-01-17 DIAGNOSIS — E11.622: ICD-10-CM

## 2022-01-17 DIAGNOSIS — L97.212: ICD-10-CM
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
## 2022-01-17 DIAGNOSIS — T86.828: Primary | ICD-10-CM

## 2022-01-17 DIAGNOSIS — E66.9: ICD-10-CM

## 2022-01-17 PROCEDURE — 11045 DBRDMT SUBQ TISS EACH ADDL: CPT

## 2022-01-17 PROCEDURE — 11042 DBRDMT SUBQ TIS 1ST 20SQCM/<: CPT

## 2022-01-24 ENCOUNTER — HOSPITAL ENCOUNTER (OUTPATIENT)
Dept: HOSPITAL 90 - WHH | Age: 67
Discharge: HOME | End: 2022-01-24
Attending: FAMILY MEDICINE
Payer: MEDICARE

## 2022-01-24 DIAGNOSIS — L97.212: ICD-10-CM

## 2022-01-24 DIAGNOSIS — M16.11: ICD-10-CM

## 2022-01-24 DIAGNOSIS — E66.9: ICD-10-CM

## 2022-01-24 DIAGNOSIS — I87.322: ICD-10-CM

## 2022-01-24 DIAGNOSIS — E11.622: ICD-10-CM
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
## 2022-01-24 DIAGNOSIS — I87.331: ICD-10-CM

## 2022-01-24 DIAGNOSIS — T86.828: Primary | ICD-10-CM

## 2022-01-24 DIAGNOSIS — Z79.01: ICD-10-CM

## 2022-01-24 PROCEDURE — 11042 DBRDMT SUBQ TIS 1ST 20SQCM/<: CPT

## 2022-01-31 ENCOUNTER — HOSPITAL ENCOUNTER (OUTPATIENT)
Dept: HOSPITAL 90 - WHH | Age: 67
Discharge: HOME | End: 2022-01-31
Attending: FAMILY MEDICINE
Payer: MEDICARE

## 2022-01-31 DIAGNOSIS — M16.11: ICD-10-CM

## 2022-01-31 DIAGNOSIS — E11.622: Primary | ICD-10-CM

## 2022-01-31 DIAGNOSIS — I87.331: ICD-10-CM

## 2022-01-31 DIAGNOSIS — Z79.01: ICD-10-CM

## 2022-01-31 DIAGNOSIS — L97.212: ICD-10-CM

## 2022-01-31 DIAGNOSIS — E66.9: ICD-10-CM

## 2022-01-31 DIAGNOSIS — I87.322: ICD-10-CM

## 2022-01-31 PROCEDURE — 29580 STRAPPING UNNA BOOT: CPT

## 2022-02-14 ENCOUNTER — HOSPITAL ENCOUNTER (OUTPATIENT)
Dept: HOSPITAL 90 - WHH | Age: 67
Discharge: HOME | End: 2022-02-14
Attending: FAMILY MEDICINE
Payer: MEDICARE

## 2022-02-14 DIAGNOSIS — I87.322: ICD-10-CM

## 2022-02-14 DIAGNOSIS — E66.9: ICD-10-CM

## 2022-02-14 DIAGNOSIS — M16.11: ICD-10-CM

## 2022-02-14 DIAGNOSIS — Z79.01: ICD-10-CM

## 2022-02-14 DIAGNOSIS — I87.331: ICD-10-CM

## 2022-02-14 DIAGNOSIS — L97.212: ICD-10-CM

## 2022-02-14 DIAGNOSIS — E11.622: Primary | ICD-10-CM

## 2022-02-14 PROCEDURE — 11042 DBRDMT SUBQ TIS 1ST 20SQCM/<: CPT

## 2022-02-24 ENCOUNTER — HOSPITAL ENCOUNTER (OUTPATIENT)
Dept: HOSPITAL 90 - WHH | Age: 67
Discharge: HOME | End: 2022-02-24
Attending: FAMILY MEDICINE
Payer: MEDICARE

## 2022-02-24 DIAGNOSIS — L03.115: ICD-10-CM

## 2022-02-24 DIAGNOSIS — E11.622: Primary | ICD-10-CM

## 2022-02-24 DIAGNOSIS — L97.212: ICD-10-CM

## 2022-02-24 DIAGNOSIS — Z79.01: ICD-10-CM

## 2022-02-24 DIAGNOSIS — E66.9: ICD-10-CM

## 2022-02-24 DIAGNOSIS — M16.11: ICD-10-CM

## 2022-02-24 DIAGNOSIS — I87.322: ICD-10-CM

## 2022-02-24 DIAGNOSIS — I87.331: ICD-10-CM

## 2022-02-24 PROCEDURE — 11042 DBRDMT SUBQ TIS 1ST 20SQCM/<: CPT

## 2022-03-07 ENCOUNTER — HOSPITAL ENCOUNTER (OUTPATIENT)
Dept: HOSPITAL 90 - WHH | Age: 67
Discharge: HOME | End: 2022-03-07
Attending: FAMILY MEDICINE
Payer: MEDICARE

## 2022-03-07 DIAGNOSIS — E66.9: ICD-10-CM

## 2022-03-07 DIAGNOSIS — M16.11: ICD-10-CM

## 2022-03-07 DIAGNOSIS — E11.622: Primary | ICD-10-CM

## 2022-03-07 DIAGNOSIS — L03.115: ICD-10-CM

## 2022-03-07 DIAGNOSIS — Z79.01: ICD-10-CM

## 2022-03-07 DIAGNOSIS — I87.322: ICD-10-CM

## 2022-03-07 DIAGNOSIS — I87.331: ICD-10-CM

## 2022-03-07 DIAGNOSIS — L97.212: ICD-10-CM

## 2022-03-07 PROCEDURE — 11042 DBRDMT SUBQ TIS 1ST 20SQCM/<: CPT

## 2022-03-14 ENCOUNTER — HOSPITAL ENCOUNTER (OUTPATIENT)
Dept: HOSPITAL 90 - WHH | Age: 67
Discharge: HOME | End: 2022-03-14
Attending: FAMILY MEDICINE
Payer: MEDICARE

## 2022-03-14 DIAGNOSIS — L97.212: ICD-10-CM

## 2022-03-14 DIAGNOSIS — I87.331: ICD-10-CM

## 2022-03-14 DIAGNOSIS — Z79.01: ICD-10-CM

## 2022-03-14 DIAGNOSIS — E66.9: ICD-10-CM

## 2022-03-14 DIAGNOSIS — L03.115: ICD-10-CM

## 2022-03-14 DIAGNOSIS — E11.622: Primary | ICD-10-CM

## 2022-03-14 DIAGNOSIS — M16.11: ICD-10-CM

## 2022-03-14 DIAGNOSIS — I87.322: ICD-10-CM

## 2022-03-14 PROCEDURE — 11042 DBRDMT SUBQ TIS 1ST 20SQCM/<: CPT

## 2022-03-21 ENCOUNTER — HOSPITAL ENCOUNTER (OUTPATIENT)
Dept: HOSPITAL 90 - WHH | Age: 67
Discharge: HOME | End: 2022-03-21
Attending: FAMILY MEDICINE
Payer: MEDICARE

## 2022-03-21 DIAGNOSIS — I87.2: ICD-10-CM

## 2022-03-21 DIAGNOSIS — L97.212: ICD-10-CM

## 2022-03-21 DIAGNOSIS — I87.331: Primary | ICD-10-CM

## 2022-03-21 DIAGNOSIS — E11.622: ICD-10-CM

## 2022-03-21 DIAGNOSIS — Z79.01: ICD-10-CM

## 2022-03-21 DIAGNOSIS — I87.322: ICD-10-CM

## 2022-03-21 DIAGNOSIS — E66.9: ICD-10-CM

## 2022-03-21 DIAGNOSIS — L03.115: ICD-10-CM

## 2022-03-21 DIAGNOSIS — M16.11: ICD-10-CM

## 2022-03-21 PROCEDURE — 11042 DBRDMT SUBQ TIS 1ST 20SQCM/<: CPT

## 2022-03-28 ENCOUNTER — HOSPITAL ENCOUNTER (OUTPATIENT)
Dept: HOSPITAL 90 - WHH | Age: 67
Discharge: HOME | End: 2022-03-28
Attending: FAMILY MEDICINE
Payer: MEDICARE

## 2022-03-28 DIAGNOSIS — I87.331: ICD-10-CM

## 2022-03-28 DIAGNOSIS — M16.11: ICD-10-CM

## 2022-03-28 DIAGNOSIS — Z79.01: ICD-10-CM

## 2022-03-28 DIAGNOSIS — L97.212: ICD-10-CM

## 2022-03-28 DIAGNOSIS — I87.322: ICD-10-CM

## 2022-03-28 DIAGNOSIS — L03.115: ICD-10-CM

## 2022-03-28 DIAGNOSIS — E11.622: Primary | ICD-10-CM

## 2022-03-28 DIAGNOSIS — E66.9: ICD-10-CM

## 2022-03-28 PROCEDURE — 11042 DBRDMT SUBQ TIS 1ST 20SQCM/<: CPT

## 2022-04-22 ENCOUNTER — HOSPITAL ENCOUNTER (OUTPATIENT)
Dept: HOSPITAL 90 - DAHIP | Age: 67
Setting detail: OBSERVATION
LOS: 5 days | Discharge: NURSING, REHAB OR LONG-TERM CARE | End: 2022-04-27
Attending: ORTHOPAEDIC SURGERY | Admitting: ORTHOPAEDIC SURGERY
Payer: MEDICARE

## 2022-04-22 VITALS — HEIGHT: 68 IN | BODY MASS INDEX: 39.56 KG/M2 | WEIGHT: 261 LBS

## 2022-04-22 DIAGNOSIS — M25.562: ICD-10-CM

## 2022-04-22 DIAGNOSIS — Z87.891: ICD-10-CM

## 2022-04-22 DIAGNOSIS — I73.9: ICD-10-CM

## 2022-04-22 DIAGNOSIS — E66.9: ICD-10-CM

## 2022-04-22 DIAGNOSIS — Z20.822: ICD-10-CM

## 2022-04-22 DIAGNOSIS — G89.29: ICD-10-CM

## 2022-04-22 DIAGNOSIS — R26.9: ICD-10-CM

## 2022-04-22 DIAGNOSIS — M16.12: Primary | ICD-10-CM

## 2022-04-22 DIAGNOSIS — D64.9: ICD-10-CM

## 2022-04-22 LAB
APPEARANCE UR: CLEAR
BASOPHILS NFR BLD AUTO: 0.9 % (ref 0–5)
BILIRUB UR QL STRIP: NEGATIVE
BUN SERPL-MCNC: 19 MG/DL (ref 7–18)
CHLORIDE SERPL-SCNC: 102 MMOL/L (ref 101–111)
CO2 SERPL-SCNC: 32 MMOL/L (ref 21–32)
COLOR UR: YELLOW
CREAT SERPL-MCNC: 1.2 MG/DL (ref 0.5–1.5)
EOSINOPHIL NFR BLD AUTO: 4.3 % (ref 0–8)
ERYTHROCYTE [DISTWIDTH] IN BLOOD BY AUTOMATED COUNT: 13.2 % (ref 11–15.5)
GFR SERPL CREATININE-BSD FRML MDRD: 64 ML/MIN (ref 60–?)
GLUCOSE SERPL-MCNC: 114 MG/DL (ref 70–105)
GLUCOSE UR STRIP-MCNC: NEGATIVE MG/DL
HCT VFR BLD AUTO: 41.1 % (ref 42–54)
HGB UR QL STRIP: NEGATIVE
INR PPP: 1.03 (ref 0.85–1.15)
KETONES UR STRIP-MCNC: NEGATIVE MG/DL
LEUKOCYTE ESTERASE UR QL STRIP: NEGATIVE
LYMPHOCYTES NFR SPEC AUTO: 31 % (ref 21–51)
MCH RBC QN AUTO: 33.3 PG (ref 27–33)
MCHC RBC AUTO-ENTMCNC: 33.6 G/DL (ref 32–36)
MCV RBC AUTO: 99 FL (ref 79–99)
MONOCYTES NFR BLD AUTO: 12.5 % (ref 3–13)
NEUTROPHILS NFR BLD AUTO: 50.8 % (ref 40–77)
NITRITE UR QL STRIP: NEGATIVE
NRBC BLD MANUAL-RTO: 0 % (ref 0–0.19)
PH UR STRIP: 6.5 [PH] (ref 5–8)
PLATELET # BLD AUTO: 221 K/UL (ref 130–400)
POTASSIUM SERPL-SCNC: 4.8 MMOL/L (ref 3.5–5.1)
PROT UR QL STRIP: NEGATIVE MG/DL
PROTHROMBIN TIME: 11.2 SEC (ref 9.6–11.6)
RBC # BLD AUTO: 4.15 MIL/UL (ref 4.5–6.2)
SODIUM SERPL-SCNC: 139 MMOL/L (ref 136–145)
SP GR UR STRIP: 1.02 (ref 1–1.03)
UROBILINOGEN UR STRIP-MCNC: 0.2 MG/DL (ref 0.2–1)
WBC # BLD AUTO: 6.5 K/UL (ref 4.8–10.8)

## 2022-04-22 PROCEDURE — 96365 THER/PROPH/DIAG IV INF INIT: CPT

## 2022-04-22 PROCEDURE — 85027 COMPLETE CBC AUTOMATED: CPT

## 2022-04-22 PROCEDURE — 76942 ECHO GUIDE FOR BIOPSY: CPT

## 2022-04-22 PROCEDURE — 36415 COLL VENOUS BLD VENIPUNCTURE: CPT

## 2022-04-22 PROCEDURE — 73503 X-RAY EXAM HIP UNI 4/> VIEWS: CPT

## 2022-04-22 PROCEDURE — 96366 THER/PROPH/DIAG IV INF ADDON: CPT

## 2022-04-22 PROCEDURE — 87088 URINE BACTERIA CULTURE: CPT

## 2022-04-22 PROCEDURE — 87641 MR-STAPH DNA AMP PROBE: CPT

## 2022-04-22 PROCEDURE — 85025 COMPLETE CBC W/AUTO DIFF WBC: CPT

## 2022-04-22 PROCEDURE — 97039 UNLISTED MODALITY: CPT

## 2022-04-22 PROCEDURE — 97161 PT EVAL LOW COMPLEX 20 MIN: CPT

## 2022-04-22 PROCEDURE — 97116 GAIT TRAINING THERAPY: CPT

## 2022-04-22 PROCEDURE — 80048 BASIC METABOLIC PNL TOTAL CA: CPT

## 2022-04-22 PROCEDURE — 81003 URINALYSIS AUTO W/O SCOPE: CPT

## 2022-04-22 PROCEDURE — 64450 NJX AA&/STRD OTHER PN/BRANCH: CPT

## 2022-04-22 PROCEDURE — 97530 THERAPEUTIC ACTIVITIES: CPT

## 2022-04-22 PROCEDURE — 87635 SARS-COV-2 COVID-19 AMP PRB: CPT

## 2022-04-22 PROCEDURE — 85610 PROTHROMBIN TIME: CPT

## 2022-04-22 PROCEDURE — 27130 TOTAL HIP ARTHROPLASTY: CPT

## 2022-04-25 VITALS — SYSTOLIC BLOOD PRESSURE: 129 MMHG | DIASTOLIC BLOOD PRESSURE: 73 MMHG

## 2022-04-25 VITALS — DIASTOLIC BLOOD PRESSURE: 80 MMHG | SYSTOLIC BLOOD PRESSURE: 133 MMHG

## 2022-04-25 VITALS — DIASTOLIC BLOOD PRESSURE: 78 MMHG | SYSTOLIC BLOOD PRESSURE: 145 MMHG

## 2022-04-25 VITALS — SYSTOLIC BLOOD PRESSURE: 133 MMHG | DIASTOLIC BLOOD PRESSURE: 73 MMHG

## 2022-04-25 VITALS — SYSTOLIC BLOOD PRESSURE: 132 MMHG | DIASTOLIC BLOOD PRESSURE: 75 MMHG

## 2022-04-25 VITALS — SYSTOLIC BLOOD PRESSURE: 135 MMHG | DIASTOLIC BLOOD PRESSURE: 73 MMHG

## 2022-04-25 VITALS — SYSTOLIC BLOOD PRESSURE: 135 MMHG | DIASTOLIC BLOOD PRESSURE: 76 MMHG

## 2022-04-25 VITALS — SYSTOLIC BLOOD PRESSURE: 141 MMHG | DIASTOLIC BLOOD PRESSURE: 76 MMHG

## 2022-04-25 VITALS — DIASTOLIC BLOOD PRESSURE: 76 MMHG | SYSTOLIC BLOOD PRESSURE: 128 MMHG

## 2022-04-25 VITALS — DIASTOLIC BLOOD PRESSURE: 69 MMHG | SYSTOLIC BLOOD PRESSURE: 140 MMHG

## 2022-04-25 VITALS — SYSTOLIC BLOOD PRESSURE: 134 MMHG | DIASTOLIC BLOOD PRESSURE: 73 MMHG

## 2022-04-25 VITALS — DIASTOLIC BLOOD PRESSURE: 69 MMHG | SYSTOLIC BLOOD PRESSURE: 144 MMHG

## 2022-04-25 VITALS — SYSTOLIC BLOOD PRESSURE: 134 MMHG | DIASTOLIC BLOOD PRESSURE: 72 MMHG

## 2022-04-25 VITALS — SYSTOLIC BLOOD PRESSURE: 154 MMHG | DIASTOLIC BLOOD PRESSURE: 76 MMHG

## 2022-04-25 VITALS — SYSTOLIC BLOOD PRESSURE: 137 MMHG | DIASTOLIC BLOOD PRESSURE: 69 MMHG

## 2022-04-25 VITALS — DIASTOLIC BLOOD PRESSURE: 66 MMHG | SYSTOLIC BLOOD PRESSURE: 129 MMHG

## 2022-04-25 VITALS — SYSTOLIC BLOOD PRESSURE: 138 MMHG | DIASTOLIC BLOOD PRESSURE: 82 MMHG

## 2022-04-25 VITALS — SYSTOLIC BLOOD PRESSURE: 148 MMHG | DIASTOLIC BLOOD PRESSURE: 75 MMHG

## 2022-04-25 VITALS — DIASTOLIC BLOOD PRESSURE: 81 MMHG | SYSTOLIC BLOOD PRESSURE: 122 MMHG

## 2022-04-25 VITALS — SYSTOLIC BLOOD PRESSURE: 146 MMHG | DIASTOLIC BLOOD PRESSURE: 71 MMHG

## 2022-04-25 RX ADMIN — SODIUM CHLORIDE ONE GM: 9 INJECTION, SOLUTION INTRAVENOUS at 09:38

## 2022-04-25 RX ADMIN — FAMOTIDINE SCH MG: 20 TABLET, FILM COATED ORAL at 21:44

## 2022-04-25 RX ADMIN — SODIUM CHLORIDE SCH MLS/HR: 0.9 INJECTION, SOLUTION INTRAVENOUS at 21:51

## 2022-04-25 RX ADMIN — SODIUM CHLORIDE SCH: 0.9 INJECTION, SOLUTION INTRAVENOUS at 19:14

## 2022-04-25 RX ADMIN — SODIUM CHLORIDE ONE GM: 9 INJECTION, SOLUTION INTRAVENOUS at 16:32

## 2022-04-25 RX ADMIN — CELECOXIB SCH MG: 200 CAPSULE ORAL at 21:44

## 2022-04-25 RX ADMIN — PREGABALIN SCH MG: 25 CAPSULE ORAL at 21:44

## 2022-04-26 VITALS — SYSTOLIC BLOOD PRESSURE: 119 MMHG | DIASTOLIC BLOOD PRESSURE: 57 MMHG

## 2022-04-26 VITALS — DIASTOLIC BLOOD PRESSURE: 62 MMHG | SYSTOLIC BLOOD PRESSURE: 122 MMHG

## 2022-04-26 VITALS — SYSTOLIC BLOOD PRESSURE: 124 MMHG | DIASTOLIC BLOOD PRESSURE: 66 MMHG

## 2022-04-26 VITALS — SYSTOLIC BLOOD PRESSURE: 134 MMHG | DIASTOLIC BLOOD PRESSURE: 67 MMHG

## 2022-04-26 VITALS — SYSTOLIC BLOOD PRESSURE: 117 MMHG | DIASTOLIC BLOOD PRESSURE: 61 MMHG

## 2022-04-26 VITALS — DIASTOLIC BLOOD PRESSURE: 77 MMHG | SYSTOLIC BLOOD PRESSURE: 135 MMHG

## 2022-04-26 VITALS — SYSTOLIC BLOOD PRESSURE: 123 MMHG | DIASTOLIC BLOOD PRESSURE: 66 MMHG

## 2022-04-26 VITALS — SYSTOLIC BLOOD PRESSURE: 124 MMHG | DIASTOLIC BLOOD PRESSURE: 70 MMHG

## 2022-04-26 VITALS — DIASTOLIC BLOOD PRESSURE: 59 MMHG | SYSTOLIC BLOOD PRESSURE: 118 MMHG

## 2022-04-26 VITALS — DIASTOLIC BLOOD PRESSURE: 73 MMHG | SYSTOLIC BLOOD PRESSURE: 124 MMHG

## 2022-04-26 LAB
BUN SERPL-MCNC: 13 MG/DL (ref 7–18)
CHLORIDE SERPL-SCNC: 104 MMOL/L (ref 101–111)
CO2 SERPL-SCNC: 26 MMOL/L (ref 21–32)
CREAT SERPL-MCNC: 1 MG/DL (ref 0.5–1.5)
ERYTHROCYTE [DISTWIDTH] IN BLOOD BY AUTOMATED COUNT: 13.2 % (ref 11–15.5)
GFR SERPL CREATININE-BSD FRML MDRD: 79 ML/MIN (ref 60–?)
GLUCOSE SERPL-MCNC: 170 MG/DL (ref 70–105)
HCT VFR BLD AUTO: 31.9 % (ref 42–54)
MCH RBC QN AUTO: 32.6 PG (ref 27–33)
MCHC RBC AUTO-ENTMCNC: 32.9 G/DL (ref 32–36)
MCV RBC AUTO: 99.1 FL (ref 79–99)
NRBC BLD MANUAL-RTO: 0 % (ref 0–0.19)
PLATELET # BLD AUTO: 182 K/UL (ref 130–400)
POTASSIUM SERPL-SCNC: 4.4 MMOL/L (ref 3.5–5.1)
RBC # BLD AUTO: 3.22 MIL/UL (ref 4.5–6.2)
SODIUM SERPL-SCNC: 139 MMOL/L (ref 136–145)
WBC # BLD AUTO: 11.7 K/UL (ref 4.8–10.8)

## 2022-04-26 RX ADMIN — SODIUM CHLORIDE SCH MLS/HR: 9 INJECTION, SOLUTION INTRAVENOUS at 08:48

## 2022-04-26 RX ADMIN — CELECOXIB SCH MG: 200 CAPSULE ORAL at 20:58

## 2022-04-26 RX ADMIN — FAMOTIDINE SCH MG: 20 TABLET, FILM COATED ORAL at 08:49

## 2022-04-26 RX ADMIN — CELECOXIB SCH MG: 200 CAPSULE ORAL at 08:49

## 2022-04-26 RX ADMIN — Medication SCH GM: at 08:49

## 2022-04-26 RX ADMIN — ACETAMINOPHEN SCH MG: 500 TABLET, COATED ORAL at 14:05

## 2022-04-26 RX ADMIN — ACETAMINOPHEN SCH MG: 500 TABLET, COATED ORAL at 20:58

## 2022-04-26 RX ADMIN — SODIUM CHLORIDE SCH MLS/HR: 0.9 INJECTION, SOLUTION INTRAVENOUS at 15:18

## 2022-04-26 RX ADMIN — PREGABALIN SCH MG: 25 CAPSULE ORAL at 20:59

## 2022-04-26 RX ADMIN — FAMOTIDINE SCH MG: 20 TABLET, FILM COATED ORAL at 20:57

## 2022-04-26 RX ADMIN — SODIUM CHLORIDE SCH MLS/HR: 9 INJECTION, SOLUTION INTRAVENOUS at 00:56

## 2022-04-26 RX ADMIN — PREGABALIN SCH MG: 25 CAPSULE ORAL at 08:50

## 2022-04-26 RX ADMIN — APIXABAN SCH MG: 2.5 TABLET, FILM COATED ORAL at 08:50

## 2022-04-26 RX ADMIN — APIXABAN SCH MG: 2.5 TABLET, FILM COATED ORAL at 20:59

## 2022-04-26 RX ADMIN — SODIUM CHLORIDE SCH MLS/HR: 0.9 INJECTION, SOLUTION INTRAVENOUS at 05:30

## 2022-04-26 RX ADMIN — TAMSULOSIN HYDROCHLORIDE SCH MG: 0.4 CAPSULE ORAL at 08:49

## 2022-04-27 VITALS — DIASTOLIC BLOOD PRESSURE: 66 MMHG | SYSTOLIC BLOOD PRESSURE: 125 MMHG

## 2022-04-27 VITALS — DIASTOLIC BLOOD PRESSURE: 61 MMHG | SYSTOLIC BLOOD PRESSURE: 109 MMHG

## 2022-04-27 VITALS — DIASTOLIC BLOOD PRESSURE: 68 MMHG | SYSTOLIC BLOOD PRESSURE: 126 MMHG

## 2022-04-27 VITALS — SYSTOLIC BLOOD PRESSURE: 110 MMHG | DIASTOLIC BLOOD PRESSURE: 68 MMHG

## 2022-04-27 VITALS — DIASTOLIC BLOOD PRESSURE: 69 MMHG | SYSTOLIC BLOOD PRESSURE: 140 MMHG

## 2022-04-27 LAB
ERYTHROCYTE [DISTWIDTH] IN BLOOD BY AUTOMATED COUNT: 13.6 % (ref 11–15.5)
HCT VFR BLD AUTO: 27.3 % (ref 42–54)
MCH RBC QN AUTO: 33.3 PG (ref 27–33)
MCHC RBC AUTO-ENTMCNC: 33.3 G/DL (ref 32–36)
MCV RBC AUTO: 100 FL (ref 79–99)
NRBC BLD MANUAL-RTO: 0 % (ref 0–0.19)
PLATELET # BLD AUTO: 152 K/UL (ref 130–400)
RBC # BLD AUTO: 2.73 MIL/UL (ref 4.5–6.2)
WBC # BLD AUTO: 9.4 K/UL (ref 4.8–10.8)

## 2022-04-27 RX ADMIN — ACETAMINOPHEN SCH MG: 500 TABLET, COATED ORAL at 14:26

## 2022-04-27 RX ADMIN — CELECOXIB SCH MG: 200 CAPSULE ORAL at 19:41

## 2022-04-27 RX ADMIN — PREGABALIN SCH MG: 25 CAPSULE ORAL at 08:47

## 2022-04-27 RX ADMIN — APIXABAN SCH MG: 2.5 TABLET, FILM COATED ORAL at 19:41

## 2022-04-27 RX ADMIN — ACETAMINOPHEN SCH MG: 500 TABLET, COATED ORAL at 06:32

## 2022-04-27 RX ADMIN — FAMOTIDINE SCH MG: 20 TABLET, FILM COATED ORAL at 19:41

## 2022-04-27 RX ADMIN — Medication SCH GM: at 08:48

## 2022-04-27 RX ADMIN — PREGABALIN SCH MG: 25 CAPSULE ORAL at 19:42

## 2022-04-27 RX ADMIN — FAMOTIDINE SCH MG: 20 TABLET, FILM COATED ORAL at 08:47

## 2022-04-27 RX ADMIN — CELECOXIB SCH MG: 200 CAPSULE ORAL at 08:47

## 2022-04-27 RX ADMIN — APIXABAN SCH MG: 2.5 TABLET, FILM COATED ORAL at 08:47

## 2022-04-27 RX ADMIN — TAMSULOSIN HYDROCHLORIDE SCH MG: 0.4 CAPSULE ORAL at 08:47

## 2023-10-25 ENCOUNTER — HOSPITAL ENCOUNTER (OUTPATIENT)
Dept: HOSPITAL 90 - RAH | Age: 68
Discharge: HOME | End: 2023-10-25
Attending: NURSE PRACTITIONER
Payer: MEDICARE

## 2023-10-25 DIAGNOSIS — M47.22: Primary | ICD-10-CM

## 2023-10-25 DIAGNOSIS — M48.02: ICD-10-CM

## 2023-10-25 PROCEDURE — 72141 MRI NECK SPINE W/O DYE: CPT

## 2024-06-17 ENCOUNTER — HOSPITAL ENCOUNTER (OUTPATIENT)
Dept: GENERAL RADIOLOGY | Facility: HOSPITAL | Age: 69
Discharge: HOME | End: 2024-06-17
Admitting: PHYSICIAN ASSISTANT
Payer: MEDICARE

## 2024-06-17 ENCOUNTER — OFFICE VISIT (OUTPATIENT)
Dept: NEUROSURGERY | Facility: CLINIC | Age: 69
End: 2024-06-17
Payer: MEDICARE

## 2024-06-17 VITALS — HEIGHT: 69 IN | WEIGHT: 237 LBS | BODY MASS INDEX: 35.1 KG/M2

## 2024-06-17 DIAGNOSIS — Z78.9 NONSMOKER: ICD-10-CM

## 2024-06-17 DIAGNOSIS — M48.02 SPINAL STENOSIS IN CERVICAL REGION: ICD-10-CM

## 2024-06-17 DIAGNOSIS — E66.09 CLASS 2 OBESITY DUE TO EXCESS CALORIES WITHOUT SERIOUS COMORBIDITY WITH BODY MASS INDEX (BMI) OF 35.0 TO 35.9 IN ADULT: ICD-10-CM

## 2024-06-17 DIAGNOSIS — R47.01 EXPRESSIVE APHASIA: Primary | ICD-10-CM

## 2024-06-17 DIAGNOSIS — M50.30 DDD (DEGENERATIVE DISC DISEASE), CERVICAL: ICD-10-CM

## 2024-06-17 PROBLEM — E66.812 CLASS 2 OBESITY DUE TO EXCESS CALORIES WITHOUT SERIOUS COMORBIDITY WITH BODY MASS INDEX (BMI) OF 35.0 TO 35.9 IN ADULT: Status: ACTIVE | Noted: 2024-06-17

## 2024-06-17 PROCEDURE — 72050 X-RAY EXAM NECK SPINE 4/5VWS: CPT

## 2024-06-17 PROCEDURE — 1159F MED LIST DOCD IN RCRD: CPT | Performed by: PHYSICIAN ASSISTANT

## 2024-06-17 PROCEDURE — 1160F RVW MEDS BY RX/DR IN RCRD: CPT | Performed by: PHYSICIAN ASSISTANT

## 2024-06-17 PROCEDURE — 99204 OFFICE O/P NEW MOD 45 MIN: CPT | Performed by: PHYSICIAN ASSISTANT

## 2024-06-17 RX ORDER — ASPIRIN 81 MG/1
1 TABLET ORAL DAILY
COMMUNITY

## 2024-06-17 RX ORDER — CLOPIDOGREL BISULFATE 75 MG/1
75 TABLET ORAL
COMMUNITY
Start: 2024-04-17 | End: 2024-07-17

## 2024-06-17 RX ORDER — ATORVASTATIN CALCIUM 40 MG/1
40 TABLET, FILM COATED ORAL
COMMUNITY
Start: 2024-04-10 | End: 2024-07-10

## 2024-06-17 NOTE — PROGRESS NOTES
Chief complaint:   Chief Complaint   Patient presents with   • Neck Pain     Pt reports to office for 2nd opinion- cervical radiculopathy-numbness in tingling in right had. Pts wife stated he was seen in [place] and had surgery, stated that he had three new discs put in and that he ended up having a stroke during the surgery.       Subjective     HPI: Justin comes in today with his wife who assists in providing history.  He and his wife reside in [facility] 6 months out of the year.  The other 6 months they reside in [place].  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 He has residual expressive aphasia.  He is on Plavix.  He has an upcoming appointment with neurology in August.  His wife is hopeful to get in sooner because he only has about 1 month left of Plavix.  He denies any neck pain and radicular pain to the upper extremities.  He is ambulatory and denies neck pain and paresthesias. He is not taking anything for pain.    Past medical history is significant for stroke as noted above.  Wife states he has not had any other health problems until the stroke.    He has never smoked.  He drinks 1 beer daily.  He is retired.  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   HENT:  Positive for voice change.    Eyes:  Positive for discharge, redness and itching.   Respiratory:  Positive for wheezing.    Neurological:  Positive for speech difficulty, weakness, light-headedness and numbness.   Psychiatric/Behavioral:  Positive for agitation, confusion, decreased concentration and dysphoric mood. The patient is nervous/anxious.         History reviewed. No pertinent past medical history.  History reviewed. No pertinent surgical history.  History reviewed. No pertinent family  "history.     (Not in a hospital admission)    Allergies:  Patient has no known allergies.    Objective      Vital Signs  Ht 175.3 cm (69\")   Wt 108 kg (237 lb)   BMI 35.00 kg/m²     Physical Exam  Constitutional:       Appearance: Normal appearance. He is well-developed.   HENT:      Head: Normocephalic.      Mouth/Throat:      Mouth: Mucous membranes are moist.   Eyes:      General: Lids are normal.      Extraocular Movements: Extraocular movements intact and EOM normal.      Conjunctiva/sclera: Conjunctivae normal.      Pupils: Pupils are equal, round, and reactive to light.   Cardiovascular:      Rate and Rhythm: Normal rate.   Pulmonary:      Effort: Pulmonary effort is normal.      Breath sounds: Normal breath sounds.   Musculoskeletal:         General: Normal range of motion.      Cervical back: Normal range of motion. No tenderness.   Skin:     General: Skin is warm and dry.      Comments: Anterior cervical incision is healed   Neurological:      Mental Status: He is alert and oriented to person, place, and time.      GCS: GCS eye subscore is 4. GCS verbal subscore is 5. GCS motor subscore is 6.      Cranial Nerves: Cranial nerves 2-12 are intact. No cranial nerve deficit.      Sensory: No sensory deficit.      Motor: No weakness.      Coordination: Coordination normal.      Gait: Gait abnormal.      Deep Tendon Reflexes: Reflexes are normal and symmetric. Reflexes normal.      Reflex Scores:       Tricep reflexes are 2+ on the right side and 2+ on the left side.       Bicep reflexes are 2+ on the right side and 2+ on the left side.       Brachioradialis reflexes are 2+ on the right side and 2+ on the left side.       Patellar reflexes are 2+ on the right side and 2+ on the left side.       Achilles reflexes are 2+ on the right side and 2+ on the left side.  Psychiatric:      Comments: Patient gets agitated due to difficulty with word finding       Neurologic Exam     Mental Status   Oriented to person, " place, and time.   Follows 3 step commands.   Attention: normal.   Level of consciousness: alert  Normal comprehension.   Expressive aphasia     Cranial Nerves   Cranial nerves II through XII intact.     CN III, IV, VI   Pupils are equal, round, and reactive to light.  Extraocular motions are normal.   Diplopia: none    Motor Exam   Muscle bulk: normal  Overall muscle tone: normal    Strength   Strength 5/5 except as noted. Mild left wrist flexor weakness graded 4+/5     Sensory Exam   Light touch normal.     Gait, Coordination, and Reflexes     Reflexes   Right brachioradialis: 2+  Left brachioradialis: 2+  Right biceps: 2+  Left biceps: 2+  Right triceps: 2+  Left triceps: 2+  Right patellar: 2+  Left patellar: 2+  Right achilles: 2+  Left achilles: 2+  Right : 2+  Left : 2+Gait is mildly antalgic which wife indicates is not new.       Imaging review: XR spine cervical ap and lat w flex and ext    Result Date: 6/17/2024  Interval ACDF with satisfactory position of the hardware, which extends from C3-C6, no evidence of dynamic instability with flexion and extension. No acute abnormality.  This report was signed and finalized on 6/17/2024 12:30 PM by Dr. Rj Bui MD.        Reviewed and agree with above    MRI and CT reports of brain imaging were reviewed.    Assessment/Plan: Elias is 15 weeks status post ACDF C3-6 which was actually done in [place].  From a cervical standpoint, he is neurologically stable and having no pain or radicular symptoms.  He is having persistent aphasia from his CVA.    I am going to go ahead and get him set up with speech therapy as an outpatient and see if we can get him a sooner appointment with neurology.    He will observe 10 pound lifting restriction as well as avoidance of any overhead lifting.    I would like to get a CT scan of the cervical spine in about 8 weeks to make sure he is starting to fuse.  He will follow-up here after the CT scan of the cervical spine is  completed with Dr. Salvador.  He will call for sooner appointment if he has any issues or concerns.    Patient is a nonsmoker    The patient's Body mass index is 35 kg/m².. BMI is above normal parameters. Recommendations include: educational material    ADVANCED CARE PLANNING  Information on advance directives provided in AVS.    ESPERANZA Fall Risk Assessment was completed, and patient is at LOW risk for falls.Assessment completed on:6/17/2024       Diagnoses and all orders for this visit:    1. Expressive aphasia (Primary)  -     Ambulatory Referral to Speech Therapy for Evaluation & Treatment    2. DDD (degenerative disc disease), cervical  -     XR spine cervical ap and lat w flex and ext; Future  -     CT Cervical Spine Without Contrast; Future    3. Spinal stenosis in cervical region  -     XR spine cervical ap and lat w flex and ext; Future    4. Nonsmoker    5. Class 2 obesity due to excess calories without serious comorbidity with body mass index (BMI) of 35.0 to 35.9 in adult      I spent 52 minutes caring for Justin on this date of service. This time includes time spent by me in the following activities: preparing for the visit, reviewing tests, obtaining and/or reviewing a separately obtained history, performing a medically appropriate examination and/or evaluation, counseling and educating the patient/family/caregiver, referring and communicating with other health care professionals, documenting information in the medical record, independently interpreting results and communicating that information with the patient/family/caregiver, and care coordination.       I discussed the patients findings and my recommendations with patient    Maite Dodd PA-C  06/17/24  13:22 CDT

## 2024-06-17 NOTE — PATIENT INSTRUCTIONS
Neurology will be calling you with a sooner appointment.  No lifting greater than 10 pounds.  You have been referred for speech therapy.  Prescription was provided today.  You will need to schedule the appointment yourself.  A CT scan of the cervical spine has been ordered prior to next appointment  Call for sooner appointment if you have any significant neck pain, radiating pain or other issues or concerns.

## 2024-06-20 ENCOUNTER — OFFICE VISIT (OUTPATIENT)
Dept: PHYSICAL THERAPY | Facility: CLINIC | Age: 69
End: 2024-06-20
Payer: MEDICARE

## 2024-06-20 DIAGNOSIS — R48.2 APRAXIA: ICD-10-CM

## 2024-06-20 DIAGNOSIS — R47.01 APHASIA: Primary | ICD-10-CM

## 2024-06-20 NOTE — PROGRESS NOTES
Speech/Language Pathology Initial Evaluation and Plan of Care    Patient: Justin Londono               : 1955  Visit Date: 2024  Referring practitioner: Maite Dodd PA-C  Date of Initial Visit: 2024  Patient seen for 1 sessions    Visit Diagnoses:    ICD-10-CM ICD-9-CM   1. Aphasia  R47.01 784.3   2. Apraxia  R48.2 784.69     Past Medical History:   Diagnosis Date    Bleeding tendency     Stroke      Past Surgical History:   Procedure Laterality Date    CERVICAL DISC ARTHROPLASTY      2024       SUBJECTIVE     Subjective Patient was seen today for assessment of speech and language   Patient presents with the following symptoms: Difficulty with verbal expression and comprehension. Mild swallowing difficulty.   Date of Onset: 2024  History of present problems: Patient had ACDF surgery in [place] on 24 and unfortunately had an intraoperative stroke.   The functional impact on the patient: Difficulty communicating and understanding conversation.    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   Pertinent Medical History Related to this Problem: Stroke    OBJECTIVE     Aphasia Diagnostic Profiles (ADP): Assesses language and communication impairment associated with aphasia resulting from acquired brain damage. The ADP consists of nine sub tests and yields aphasia type and severity as well as alternative communication profile.     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 Patient's wife stated it is occasional, not all the time. Patient was able to complete 3oz water swallow test. Patient was advised on oral care. Swallow will be monitored and assessed if warranted.      IMPRESSION/RECOMMENDATIONS  Factors Affecting Performance: None  Learning Motivation: strong  Education/Learning Comments: Patient and spouse demonstrated understanding of evaluation results and plan of care.     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 Mild difficulty with swallowing will be monitored.   Impact on Function: Difficulty communicating and comprehending conversation.   Prognosis Comment: Good      Therapy Education/Self Care    Details: Evaluation results and POC   Given Oral care recommendations   Progress: New   Education provided to:  Patient and Spouse/SO   Level of understanding Verbalized, Demonstrated, and Continued reinforcement needed           Total Time of Visit:            65   mins    ASSESSMENT/PLAN     Goals                                         STG Comments Status   Patient will improve verbal expressive language skills by completing automatic  speech tasks, naming objects/pictures, and completing open-ended phrases/sentences  New   Patient will improve comprehension of spoken language by following 1 step then two step directions presented verbally and/or in writing  New   Patient will improve comprehension and expression of written language skills by complete functional reading and writing tasks  New        LTG by:      Patient will be able to verbally and or in writing express basic wants and needs in home environment  New   Patient will comprehend basic spoken and written information presented in home environment  New       ASSESSMENT:   Patient is indicated for skilled care by a Speech/Language Pathologist.     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 Swallow will be monitored.     Therapy Recommendations:   Screening indicates the further need for Physical Therapy and Occupational Therapy.    PLAN:  Details of Plan of Care: Initiate therapy and home exercises.      Frequency: 2 times per week  Duration: 24 visits  Estimated Length of Session: 45 minutes      SPEECH/LANGUAGE PATHOLOGIST SIGNATURE: Zabrina Bhat, CCC-SLP  License # [id number]  Electronically signed on 6/20/2024      Initial Certification  Certification Period: 6/20/2024 through 9/17/2024  I certify that the therapy services are furnished while this patient is under my care.  The services outlined above are required by this patient, and will be reviewed every 90 days.     PHYSICIAN:     Maite Dodd PA-C______________________________________DATE: _________     Please sign and return via fax to [phone].   Thank you so much for letting us work with Justin. I appreciate your letting us work with your patients. If you have any questions or concerns, please don't hesitate to contact me.

## 2024-06-21 ENCOUNTER — PATIENT ROUNDING (BHMG ONLY) (OUTPATIENT)
Dept: NEUROSURGERY | Facility: CLINIC | Age: 69
End: 2024-06-21
Payer: MEDICARE

## 2024-06-21 NOTE — PROGRESS NOTES
A My-Chart message has been sent to the patient for PATIENT ROUNDING with [facility].

## 2024-06-25 ENCOUNTER — TREATMENT (OUTPATIENT)
Dept: PHYSICAL THERAPY | Facility: CLINIC | Age: 69
End: 2024-06-25
Payer: MEDICARE

## 2024-06-25 DIAGNOSIS — R47.01 APHASIA: Primary | ICD-10-CM

## 2024-06-25 DIAGNOSIS — R48.2 APRAXIA: ICD-10-CM

## 2024-06-25 NOTE — PROGRESS NOTES
"                                                                  Speech Language Pathology Treatment Note  [address] [place] [address]    Patient: Justin Londono                                                                                     Visit Date: 2024  :     1955    Referring practitioner:    Maite Dodd PA-C  Date of Initial Visit:          Type: THERAPY  Noted: 2024    Patient seen for 2 sessions    Visit Diagnoses:    ICD-10-CM ICD-9-CM   1. Aphasia  R47.01 784.3   2. Apraxia  R48.2 784.69     SUBJECTIVE     Patient arrived alert and ready for therapy today.     OBJECTIVE   GOALS    Goals                                         STG Comments Status   Patient will improve verbal expressive language skills by completing automatic  speech tasks, naming objects/pictures, and completing open-ended phrases/sentences Automatic Speech was assessed for baseline. Patient was able to complete 1-8 independently but stated that he did it incorrectly then restarted. He then completed 1-20 independently. Patient needed maximum cues, model, and choral speaking to complete the days of the week.  Patient was given a naming task with an object and a carrier phrase. Patient was able to name 27 objects with maximum cues (semantic, phonemic and repetition), 17 objects only correctly producing number of syllables, phonemes, or vowels with maximum cues, and was unable to produce the names 6 of the objects. New   Patient will improve comprehension of spoken language by following 1 step then two step directions presented verbally and/or in writing Patient was able to identify objects after initial cues and introduction to task. Patient was able follow one step directions with objects (\"hand me the pen.\", \"point to the spoon.\") with maximum cues (visual and gesture). He needed maximum cue and model to follow direction to point to his nose.  New   Patient will improve comprehension and expression of " written language skills by complete functional reading and writing tasks Patient performed better when given picture cards with words rather than no words. Reading and writing tasks were not given today. New           LTG by:        Patient will be able to verbally and or in writing express basic wants and needs in home environment Patient was able to complete some automatic speech tasks- counting and days of the week with max cues. He was able to name 27 objects when given a carrier phrase and maximum cues. New   Patient will comprehend basic spoken and written information presented in home environment Patient often requires introduction to tasks before comprehending. He also presents with auditory comprehension deficits needing slower rate of speech and repetitions to comprehend others. New           Total Time of Visit:             45   mins         ASSESSMENT/PLAN     ASSESSMENT: Patient was given automatic speech tasks and was able to successfully count 1-20 independently. He needed max cues to produce days of the week and months were dismissed. He was able to follow 1 step directions when given an introduction to task. Patient was able to name 27 objects when given a carrier phrase and maximum cues (gesture and visual). He was able to name 17 objects only producing the correct, vowel, phonemes, or # of syllables. He was often able to get the phonemes in the medial position but struggle with initial or final phonemes. Vowels were mostly correct.  Patient performed better on naming task when given more background information or details about the object.    PLAN: Continue therapy.     Progress Note Due Date: 7/17/2024  Recertification Due Date: 9/17/2024    SIGNATURE: Danette Quiñones, Speech Therapy Student  Electronically Signed on 6/25/2024    The clinical instructor and/or supervising staff, Zabrina Bhat CCC-SLP, was present in clinic guiding the visit by approving, concurring, and confirming the skilled  judgement for all services rendered.    Signature:  Zabrina Bhat, CCC-SLP, [place] License #: [id number]  Electronically signed on 6/25/2024          [address] [place] [address]  [phone]

## 2024-06-28 ENCOUNTER — TREATMENT (OUTPATIENT)
Dept: PHYSICAL THERAPY | Facility: CLINIC | Age: 69
End: 2024-06-28
Payer: MEDICARE

## 2024-06-28 DIAGNOSIS — R48.2 APRAXIA: ICD-10-CM

## 2024-06-28 DIAGNOSIS — R47.01 APHASIA: Primary | ICD-10-CM

## 2024-06-28 NOTE — PROGRESS NOTES
Speech Language Pathology Treatment Note  [address] [place] [address]    Patient: Justin Londono                                                                                     Visit Date: 2024  :     1955    Referring practitioner:    Maite Dodd PA-C  Date of Initial Visit:          Type: THERAPY  Noted: 2024    Patient seen for 3 sessions    Visit Diagnoses:    ICD-10-CM ICD-9-CM   1. Aphasia  R47.01 784.3   2. Apraxia  R48.2 784.69     SUBJECTIVE     Patient arrived alert and ready for therapy today.     OBJECTIVE   GOALS    Goals                                         STG Comments Status   Patient will improve verbal expressive language skills by completing automatic  speech tasks, naming objects/pictures, and completing open-ended phrases/sentences Patient was able to independently produce 1-20 but continues to struggle with days of the week. He was able to efficiently read written days of the week. Patient was able to rearrange days of the week and numbers 1-20 to the correct order with with no difficulty.  Ongoing   Patient will improve comprehension of spoken language by following 1 step then two step directions presented verbally and/or in writing Patient was able to follow directions in context of tasks.    Previous: Patient was able to identify objects after initial cues and introduction to task. Patient was able follow one step directions with objects with maximum cues (visual and gesture). Ongoing   Patient will improve comprehension and expression of written language skills by complete functional reading and writing tasks Patient was able to name 16/50 (32%) pictures cards with a carrier phrase and minimal cues. He was able to name 15/50 (30%) with moderate cues and model and some correct phonemes of 14/50 (28%) items.Trial of writing and spelling tasks were introduced and patient was able to write  12/16 objects (75%). He was able to correctly spell objects using a device about 90% of the time.  Ongoing           LTG by:        Patient will be able to verbally and or in writing express basic wants and needs in home environment Patient was able to complete some automatic speech tasks- verbally and using reading comprehension. He was able to name picture cards with a carrier phrase and cues.  Ongoing   Patient will comprehend basic spoken and written information presented in home environment Patient often requires introduction to tasks before comprehending. He also presents with auditory comprehension deficits needing slower rate of speech and repetitions to comprehend others. Ongoing           Total Time of Visit:             60   mins         ASSESSMENT/PLAN     ASSESSMENT: Patient was given automatic speech tasks and was able to produce 1-20 independently and days of the week while reading them. He was able  to name 16/50 (32%) pictures cards with minimal cues,15/50 (30%) with moderate cues and model, and 14/50 (28%) items using some correct phonemes. Writing and spelling task was introduced. Plan to explore AAC next session.    PLAN: Continue therapy.     Progress Note Due Date: 7/17/2024  Recertification Due Date: 9/17/2024    SIGNATURE: Danette Quiñones, Speech Therapy Student  Electronically Signed on 6/28/2024    The clinical instructor and/or supervising staff, Zabrina Bhat CCC-SLP, was present in clinic guiding the visit by approving, concurring, and confirming the skilled judgement for all services rendered.    Signature:  Zabrina Bhat CCC-SLP, [place] License #: [id number]  Electronically signed on 6/28/2024          [address] [place] [address] [place] [address]  [phone]

## 2024-07-01 ENCOUNTER — OFFICE VISIT (OUTPATIENT)
Dept: INTERNAL MEDICINE | Facility: CLINIC | Age: 69
End: 2024-07-01
Payer: MEDICARE

## 2024-07-01 ENCOUNTER — LAB (OUTPATIENT)
Dept: LAB | Facility: HOSPITAL | Age: 69
End: 2024-07-01
Payer: MEDICARE

## 2024-07-01 ENCOUNTER — TREATMENT (OUTPATIENT)
Dept: PHYSICAL THERAPY | Facility: CLINIC | Age: 69
End: 2024-07-01
Payer: MEDICARE

## 2024-07-01 VITALS
TEMPERATURE: 97.8 F | DIASTOLIC BLOOD PRESSURE: 85 MMHG | WEIGHT: 238 LBS | RESPIRATION RATE: 16 BRPM | SYSTOLIC BLOOD PRESSURE: 135 MMHG | HEIGHT: 69 IN | OXYGEN SATURATION: 98 % | HEART RATE: 61 BPM | BODY MASS INDEX: 35.25 KG/M2

## 2024-07-01 DIAGNOSIS — E78.2 MIXED HYPERLIPIDEMIA: ICD-10-CM

## 2024-07-01 DIAGNOSIS — R47.01 EXPRESSIVE APHASIA: ICD-10-CM

## 2024-07-01 DIAGNOSIS — R53.83 OTHER FATIGUE: ICD-10-CM

## 2024-07-01 DIAGNOSIS — M89.9 BONE DISORDER: ICD-10-CM

## 2024-07-01 DIAGNOSIS — R53.1 RIGHT SIDED WEAKNESS: ICD-10-CM

## 2024-07-01 DIAGNOSIS — M79.604 RIGHT LEG PAIN: ICD-10-CM

## 2024-07-01 DIAGNOSIS — Z00.00 ENCOUNTER FOR MEDICAL EXAMINATION TO ESTABLISH CARE: Primary | ICD-10-CM

## 2024-07-01 DIAGNOSIS — R60.0 BILATERAL LOWER EXTREMITY EDEMA: ICD-10-CM

## 2024-07-01 DIAGNOSIS — R47.01 APHASIA: Primary | ICD-10-CM

## 2024-07-01 DIAGNOSIS — E66.09 CLASS 2 OBESITY DUE TO EXCESS CALORIES WITHOUT SERIOUS COMORBIDITY WITH BODY MASS INDEX (BMI) OF 35.0 TO 35.9 IN ADULT: ICD-10-CM

## 2024-07-01 DIAGNOSIS — Z86.73 HISTORY OF STROKE: ICD-10-CM

## 2024-07-01 DIAGNOSIS — R48.2 APRAXIA: ICD-10-CM

## 2024-07-01 LAB
25(OH)D3 SERPL-MCNC: 26.3 NG/ML (ref 30–100)
ALBUMIN SERPL-MCNC: 4.1 G/DL (ref 3.5–5.2)
ALBUMIN/GLOB SERPL: 1.2 G/DL
ALP SERPL-CCNC: 83 U/L (ref 39–117)
ALT SERPL W P-5'-P-CCNC: 16 U/L (ref 1–41)
ANION GAP SERPL CALCULATED.3IONS-SCNC: 7 MMOL/L (ref 5–15)
AST SERPL-CCNC: 17 U/L (ref 1–40)
BILIRUB SERPL-MCNC: 0.4 MG/DL (ref 0–1.2)
BUN SERPL-MCNC: 12 MG/DL (ref 8–23)
BUN/CREAT SERPL: 12.4 (ref 7–25)
CALCIUM SPEC-SCNC: 9.2 MG/DL (ref 8.6–10.5)
CHLORIDE SERPL-SCNC: 102 MMOL/L (ref 98–107)
CHOLEST SERPL-MCNC: 145 MG/DL (ref 0–200)
CO2 SERPL-SCNC: 28 MMOL/L (ref 22–29)
CREAT SERPL-MCNC: 0.97 MG/DL (ref 0.76–1.27)
DEPRECATED RDW RBC AUTO: 48.4 FL (ref 37–54)
EGFRCR SERPLBLD CKD-EPI 2021: 84.5 ML/MIN/1.73
ERYTHROCYTE [DISTWIDTH] IN BLOOD BY AUTOMATED COUNT: 13.6 % (ref 12.3–15.4)
FOLATE SERPL-MCNC: 7.95 NG/ML (ref 4.78–24.2)
GLOBULIN UR ELPH-MCNC: 3.3 GM/DL
GLUCOSE SERPL-MCNC: 107 MG/DL (ref 65–99)
HCT VFR BLD AUTO: 41.6 % (ref 37.5–51)
HDLC SERPL-MCNC: 42 MG/DL (ref 40–60)
HGB BLD-MCNC: 13.6 G/DL (ref 13–17.7)
LDLC SERPL CALC-MCNC: 85 MG/DL (ref 0–100)
LDLC/HDLC SERPL: 1.99 {RATIO}
MCH RBC QN AUTO: 31.6 PG (ref 26.6–33)
MCHC RBC AUTO-ENTMCNC: 32.7 G/DL (ref 31.5–35.7)
MCV RBC AUTO: 96.7 FL (ref 79–97)
PLATELET # BLD AUTO: 226 10*3/MM3 (ref 140–450)
PMV BLD AUTO: 9.7 FL (ref 6–12)
POTASSIUM SERPL-SCNC: 4.5 MMOL/L (ref 3.5–5.2)
PROT SERPL-MCNC: 7.4 G/DL (ref 6–8.5)
RBC # BLD AUTO: 4.3 10*6/MM3 (ref 4.14–5.8)
SODIUM SERPL-SCNC: 137 MMOL/L (ref 136–145)
TRIGL SERPL-MCNC: 98 MG/DL (ref 0–150)
TSH SERPL DL<=0.05 MIU/L-ACNC: 4.16 UIU/ML (ref 0.27–4.2)
VIT B12 BLD-MCNC: 340 PG/ML (ref 211–946)
VLDLC SERPL-MCNC: 18 MG/DL (ref 5–40)
WBC NRBC COR # BLD AUTO: 5.77 10*3/MM3 (ref 3.4–10.8)

## 2024-07-01 PROCEDURE — 82607 VITAMIN B-12: CPT

## 2024-07-01 PROCEDURE — 85027 COMPLETE CBC AUTOMATED: CPT

## 2024-07-01 PROCEDURE — 82746 ASSAY OF FOLIC ACID SERUM: CPT

## 2024-07-01 PROCEDURE — 80061 LIPID PANEL: CPT

## 2024-07-01 PROCEDURE — 1160F RVW MEDS BY RX/DR IN RCRD: CPT | Performed by: NURSE PRACTITIONER

## 2024-07-01 PROCEDURE — 99204 OFFICE O/P NEW MOD 45 MIN: CPT | Performed by: NURSE PRACTITIONER

## 2024-07-01 PROCEDURE — 36415 COLL VENOUS BLD VENIPUNCTURE: CPT

## 2024-07-01 PROCEDURE — 82306 VITAMIN D 25 HYDROXY: CPT

## 2024-07-01 PROCEDURE — 1126F AMNT PAIN NOTED NONE PRSNT: CPT | Performed by: NURSE PRACTITIONER

## 2024-07-01 PROCEDURE — G2211 COMPLEX E/M VISIT ADD ON: HCPCS | Performed by: NURSE PRACTITIONER

## 2024-07-01 PROCEDURE — 84443 ASSAY THYROID STIM HORMONE: CPT

## 2024-07-01 PROCEDURE — 80053 COMPREHEN METABOLIC PANEL: CPT

## 2024-07-01 PROCEDURE — 1159F MED LIST DOCD IN RCRD: CPT | Performed by: NURSE PRACTITIONER

## 2024-07-01 RX ORDER — CLOPIDOGREL BISULFATE 75 MG/1
75 TABLET ORAL DAILY
Qty: 90 TABLET | Refills: 1 | Status: SHIPPED | OUTPATIENT
Start: 2024-07-01 | End: 2024-12-28

## 2024-07-01 RX ORDER — ATORVASTATIN CALCIUM 40 MG/1
40 TABLET, FILM COATED ORAL NIGHTLY
Qty: 90 TABLET | Refills: 1 | Status: SHIPPED | OUTPATIENT
Start: 2024-07-01 | End: 2024-12-28

## 2024-07-01 NOTE — ASSESSMENT & PLAN NOTE
Patient's (Body mass index is 35.15 kg/m².) indicates that they are morbidly/severely obese (BMI > 40 or > 35 with obesity - related health condition) with health conditions that include dyslipidemias . Weight is newly identified. BMI  is above average; BMI management plan is completed. We discussed portion control, increasing exercise, and Information on healthy weight added to patient's after visit summary.

## 2024-07-01 NOTE — PATIENT INSTRUCTIONS

## 2024-07-01 NOTE — ASSESSMENT & PLAN NOTE
Lipid abnormalities are stable    Plan:  Continue same medication/s without change.   and labs ordered today to re-evaluate. Explained importance of taking statin and plavix to prevent future stroke. .      Discussed medication dosage, use, side effects, and goals of treatment in detail.    Counseled patient on lifestyle modifications to help control hyperlipidemia.     Patient Treatment Goals:   LDL goal is less than 70    Followup at the next regular appointment.

## 2024-07-01 NOTE — PROGRESS NOTES
Chief Complaint  Establish Care    Subjective        Justin Londono is a 69 y.o. male who presents today for evaluation of the above problems.        History of Present Illness    Elias presents with his spouse to establish care.  This is my first encounter with him. Justin recently moved to [place], [place] from [place]. He has not had a PCP in several years.     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  This surgery was performed in [place].     He continues to experience right sided weakness, worse in the upper extremity as compared to the lower extremity. He also has expressive aphasia and his spouse has to answer many questions. He is currently going to speech therapy twice per week.  Has not had occupational therapy in the past.  No new falls, injuries, or changes in mobility.    Is due to have Plavix and Lipitor refilled.  Does not understand why he needs to continue to take these medications.    The chart has him flagged as diabetic but he has never been a diabetic and his last A1c was 6.1 back in April 2024.    He is concerned because he is having swelling in bilateral lower extremities, worse on the right as compared to the left.  No history of DVT.  Says he has had a vascular surgery in the past multiple times but cannot recall the name of the procedure.  He is not currently being followed by vascular surgery here in [place].    Does report he falls asleep easily during the day.  Spouse says he is sleeping much more than usual.  No history of sleep apnea.      Review of Systems - History obtained from the patient and spouse  General ROS: positive for  - fatigue  negative for - chills or fever  Respiratory ROS: no cough, shortness of breath, or wheezing  Cardiovascular ROS: no chest pain or dyspnea on  "exertion  Gastrointestinal ROS: no abdominal pain, change in bowel habits, or black or bloody stools  Neurological ROS: no TIA or stroke symptoms    Objective   Vital Signs:  /85 (BP Location: Left arm, Patient Position: Sitting, Cuff Size: Other (Comment))   Pulse 61   Temp 97.8 °F (36.6 °C) (Temporal)   Resp 16   Ht 175.3 cm (69\")   Wt 108 kg (238 lb)   SpO2 98%   BMI 35.15 kg/m²   Estimated body mass index is 35.15 kg/m² as calculated from the following:    Height as of this encounter: 175.3 cm (69\").    Weight as of this encounter: 108 kg (238 lb).             Physical Exam  Vitals and nursing note reviewed.   Constitutional:       Appearance: Normal appearance. He is normal weight.   HENT:      Mouth/Throat:      Mouth: Mucous membranes are moist.   Neck:      Thyroid: No thyroid mass, thyromegaly or thyroid tenderness.      Trachea: Trachea and phonation normal.   Cardiovascular:      Rate and Rhythm: Normal rate and regular rhythm.      Pulses: Normal pulses.      Heart sounds: No murmur heard.     No friction rub. No gallop.   Pulmonary:      Effort: Pulmonary effort is normal. No respiratory distress.      Breath sounds: Normal breath sounds. No wheezing.   Musculoskeletal:         General: Normal range of motion.      Cervical back: Normal range of motion and neck supple.      Right lower leg: Edema (non pitting, disoloration of lower extremities) present.      Left lower leg: Edema present.   Skin:     General: Skin is warm and dry.      Capillary Refill: Capillary refill takes less than 2 seconds.   Neurological:      General: No focal deficit present.      Mental Status: He is alert and oriented to person, place, and time.      GCS: GCS eye subscore is 4. GCS verbal subscore is 5. GCS motor subscore is 6.      Sensory: Sensation is intact.      Motor: Weakness (right upper and lower extremity weakness) present. No tremor, atrophy, seizure activity or pronator drift.   Psychiatric:         " Mood and Affect: Mood normal.      Comments: Severe expressive aphasia      Physical Exam      Result Review :  The following data was reviewed by: DOROTA Mckee on 07/01/2024:  CMP          4/10/2024    15:06 7/1/2024    11:10   CMP   Glucose 96     107    BUN 11.0     12    Creatinine 1.12     0.97    EGFR 71.1     84.5    Sodium 143     137    Potassium 4.7     4.5    Chloride 106     102    Calcium 8.9     9.2    Total Protein 6.7     7.4    Albumin 3.0     4.1    Globulin  3.3    Total Bilirubin 0.3     0.4    Alkaline Phosphatase 83     83    AST (SGOT) 16     17    ALT (SGPT) 23     16    Albumin/Globulin Ratio  1.2    BUN/Creatinine Ratio 9.8     12.4    Anion Gap 7.9     7.0       Details          This result is from an external source.             CBC          7/1/2024    11:10   CBC   WBC 5.77    RBC 4.30    Hemoglobin 13.6    Hematocrit 41.6    MCV 96.7    MCH 31.6    MCHC 32.7    RDW 13.6    Platelets 226      Lipid Panel          7/1/2024    11:10   Lipid Panel   Total Cholesterol 145    Triglycerides 98    HDL Cholesterol 42    VLDL Cholesterol 18    LDL Cholesterol  85    LDL/HDL Ratio 1.99      TSH          7/1/2024    11:10   TSH   TSH 4.160        Data reviewed :    Results  HEMOGLOBIN A1C (04/10/2024 16:07 EDT)   COMPREHENSIVE METABOLIC PANEL (04/10/2024 16:06 EDT)     CBC AND DIFFERENTIAL (04/10/2024 16:07 EDT)      Assessment and Plan   Diagnoses and all orders for this visit:    1. Encounter for medical examination to establish care (Primary)    2. Mixed hyperlipidemia  Assessment & Plan:   Lipid abnormalities are stable    Plan:  Continue same medication/s without change.   and labs ordered today to re-evaluate. Explained importance of taking statin and plavix to prevent future stroke. .      Discussed medication dosage, use, side effects, and goals of treatment in detail.    Counseled patient on lifestyle modifications to help control hyperlipidemia.     Patient Treatment Goals:    LDL goal is less than 70    Followup at the next regular appointment.    Orders:  -     atorvastatin (LIPITOR) 40 MG tablet; Take 1 tablet by mouth Every Night for 180 days.  Dispense: 90 tablet; Refill: 1  -     Lipid Panel; Future    3. Bilateral lower extremity edema  Comments:  Venous doppler and XIOMARA studies ordered to further eval. Labs also ordered.  Orders:  -     US Ankle / Brachial Indices Extremity Complete; Future  -     US Venous Doppler Lower Extremity Bilateral (duplex); Future    4. Right sided weakness  Comments:  Occupational therapy consult.  Orders:  -     Ambulatory Referral to Occupational Therapy    5. Expressive aphasia  Assessment & Plan:  Continue speech therapy and follow with neurology as scheduled.       6. Class 2 obesity due to excess calories without serious comorbidity with body mass index (BMI) of 35.0 to 35.9 in adult  Assessment & Plan:  Patient's (Body mass index is 35.15 kg/m².) indicates that they are morbidly/severely obese (BMI > 40 or > 35 with obesity - related health condition) with health conditions that include dyslipidemias . Weight is newly identified. BMI  is above average; BMI management plan is completed. We discussed portion control, increasing exercise, and Information on healthy weight added to patient's after visit summary.       7. Other fatigue  Comments:  Labs ordered to evaluate. May consider home sleep study in the future if workup is negative.  Orders:  -     CBC (No Diff); Future  -     Comprehensive Metabolic Panel; Future  -     Vitamin B12; Future  -     Folate; Future  -     TSH; Future    8. History of stroke  -     clopidogrel (PLAVIX) 75 MG tablet; Take 1 tablet by mouth Daily for 180 days.  Dispense: 90 tablet; Refill: 1    9. Right leg pain  -     US Ankle / Brachial Indices Extremity Complete; Future  -     US Venous Doppler Lower Extremity Bilateral (duplex); Future    10. Bone disorder  -     Vitamin D,25-Hydroxy; Future      I have been  treating this patient over a continuum addressing both chronic and acute care concerns.       Assessment & Plan       I spent 45 minutes caring for Justin on this date of service. This time includes time spent by me in the following activities:preparing for the visit, reviewing tests, obtaining and/or reviewing a separately obtained history, performing a medically appropriate examination and/or evaluation , counseling and educating the patient/family/caregiver, ordering medications, tests, or procedures, referring and communicating with other health care professionals , and documenting information in the medical record  Follow Up   Return in about 4 weeks (around 7/29/2024) for Medicare Wellness Dr. Dinero; can move out further if needed.  Patient was given instructions and counseling regarding his condition or for health maintenance advice. Please see specific information pulled into the AVS if appropriate.     Patient has been erroneously marked as diabetic. Based on the available clinical information, he does not have diabetes and should therefore be excluded from diabetic health maintenance and quality measures for the remainder of the reporting period.

## 2024-07-01 NOTE — PROGRESS NOTES
Speech Language Pathology Treatment Note  [address] [place] [address]    Patient: Justin Londono                                                                                     Visit Date: 2024  :     1955    Referring practitioner:    Maite Dodd PA-C  Date of Initial Visit:          Type: THERAPY  Noted: 2024    Patient seen for 4 sessions    Visit Diagnoses:    ICD-10-CM ICD-9-CM   1. Aphasia  R47.01 784.3   2. Apraxia  R48.2 784.69     SUBJECTIVE     Patient arrived alert and ready for therapy today.     OBJECTIVE   GOALS    Goals                                         STG Comments Status   Patient will improve verbal expressive language skills by completing automatic  speech tasks, naming objects/pictures, and completing open-ended phrases/sentences Patient was able to independently arrange the months in the correct order. He was able to complete 12 letter fill in the blanks with minimal cues (C D E _) but was not able to complete the worksheet stating that he was stuck.  Ongoing   Patient will improve comprehension of spoken language by following 1 step then two step directions presented verbally and/or in writing Patient was able to follow directions in context of tasks with visual and verbal cues.  Ongoing   Patient will improve comprehension and expression of written language skills by complete functional reading and writing tasks CART practice was implemented to improve written language skills. Patient was able to write 6/15 independently and 9/15 after implementing training.  Ongoing           LTG by:        Patient will be able to verbally and or in writing express basic wants and needs in home environment Patient was able to write words using CART training. Patient continues to demonstrate severe apraxia of speech as well as aphasia.  Ongoing   Patient will comprehend basic spoken and written information  presented in home environment Patient often requires introduction to tasks before comprehending. He also presents with auditory comprehension deficits needing slower rate of speech and repetitions to comprehend others. Patient often has to be reminded to take his time when completing tasks. Ongoing           Total Time of Visit:             60   mins         ASSESSMENT/PLAN     ASSESSMENT: Patient was able to rearrange the months into the correct order but continues to have difficulty producing them verbally. He was able to complete 12 alphabet fill in the blank questions with minimal cues. CART training was implemented to assess written language skills. He was able to write 6/15 independently and 9/15 after implementing training.     PLAN: Continue therapy.     Progress Note Due Date: 7/17/2024  Recertification Due Date: 9/17/2024    SIGNATURE: Danette Quiñones, Speech Therapy Student  Electronically Signed on 7/1/2024    The clinical instructor and/or supervising staff, Zabrina Bhat CCC-SLP, was present in clinic guiding the visit by approving, concurring, and confirming the skilled judgement for all services rendered.    Signature:  Zabrina Bhat CCC-SLP, [place] License #: [id number]  Electronically signed on 7/1/2024          [address] [place] [address] [place] [address]  [phone]

## 2024-07-03 ENCOUNTER — TREATMENT (OUTPATIENT)
Dept: PHYSICAL THERAPY | Facility: CLINIC | Age: 69
End: 2024-07-03
Payer: MEDICARE

## 2024-07-03 ENCOUNTER — PATIENT ROUNDING (BHMG ONLY) (OUTPATIENT)
Dept: INTERNAL MEDICINE | Facility: CLINIC | Age: 69
End: 2024-07-03
Payer: MEDICARE

## 2024-07-03 DIAGNOSIS — R47.01 APHASIA: Primary | ICD-10-CM

## 2024-07-03 DIAGNOSIS — R48.2 APRAXIA: ICD-10-CM

## 2024-07-03 NOTE — PROGRESS NOTES
July 3, 2024    Hello, may I speak with Justin Londono?    My name is FLACO MELÉNDEZ      I am  with [facility]  [address], [address]  [facility] [place] [address] [phone].    Before we get started may I verify your date of birth? 1955    I am calling to officially welcome you to our practice and ask about your recent visit. Is this a good time to talk? yes    Tell me about your visit with us. What things went well?  IT WAS GOOD! EXCELLENT.       We're always looking for ways to make our patients' experiences even better. Do you have recommendations on ways we may improve?  no    Overall were you satisfied with your first visit to our practice? yes       I appreciate you taking the time to speak with me today. Is there anything else I can do for you? no      Thank you, and have a great day.

## 2024-07-03 NOTE — PROGRESS NOTES
Speech Language Pathology Treatment Note  [address] [place] [address]    Patient: Justin Londono                                                                                     Visit Date: 7/3/2024  :     1955    Referring practitioner:    Maite Dodd PA-C  Date of Initial Visit:          Type: THERAPY  Noted: 2024    Patient seen for 5 sessions    Visit Diagnoses:    ICD-10-CM ICD-9-CM   1. Aphasia  R47.01 784.3   2. Apraxia  R48.2 784.69     SUBJECTIVE     Patient arrived alert and ready for therapy today.     OBJECTIVE   GOALS    Goals                                         STG Comments Status   Patient will improve verbal expressive language skills by completing automatic  speech tasks, naming objects/pictures, and completing open-ended phrases/sentences Patient was able to complete 3 letter fill in the blanks with minimal cues (C D E _) before getting overwhelmed with task and stating that he lost the letters. Ongoing   Patient will improve comprehension of spoken language by following 1 step then two step directions presented verbally and/or in writing Introduction and trial of AAC. Patient demonstrated understanding and was able to complete tasks with visual and verbal cues. Ongoing   Patient will improve comprehension and expression of written language skills by complete functional reading and writing tasks Continued CART practice to improve written language skills. Patient was able to write 5/5 words in training independently when looking at the picture. Patient was then given all 20 pictures and asked to write the name of them. He was able to write 16/20 words independently and able to copy the other 4/20. AAC was introduced and patient was able to independently type 15/20 words, misspelled 4 but was able to find word and picture in spell assist bar, and able to type 1 with with a visual cue. Patient able to  navigate device using backspace and clear.  Ongoing           LTG by:        Patient will be able to verbally and or in writing express basic wants and needs in home environment Patient was able to write words when looking at a picture. Patient continues to demonstrate severe apraxia of speech as well as aphasia.  Ongoing   Patient will comprehend basic spoken and written information presented in home environment Patient often requires introduction to tasks before comprehending. He also presents with auditory comprehension deficits needing slower rate of speech and repetitions to comprehend others. Patient often has to be reminded to take his time when completing tasks. He demonstrates the ability to write and type words that he is not able to produce verbally. Ongoing           Total Time of Visit:             45   mins         ASSESSMENT/PLAN     ASSESSMENT: Patient became overwhelmed with fill in the blank alphabet activity but was able to complete 3 independently. He showed improvement with all  writing tasks and was able to write 16/20 words independently. Patient did well navigating AAC device and was able to type 15/20 words independently.    PLAN: Continue therapy.     Progress Note Due Date: 7/17/2024  Recertification Due Date: 9/17/2024    SIGNATURE: Danette Quiñones, Speech Therapy Student  Electronically Signed on 7/3/2024    The clinical instructor and/or supervising staff, Zabrina Bhat CCC-SLP, was present in clinic guiding the visit by approving, concurring, and confirming the skilled judgement for all services rendered.    Signature:  Zabrina Bhat CCC-SLP, [place] License #: [id number]  Electronically signed on 7/3/2024          [facility] [place] [address]  [phone]

## 2024-07-08 ENCOUNTER — TREATMENT (OUTPATIENT)
Dept: PHYSICAL THERAPY | Facility: CLINIC | Age: 69
End: 2024-07-08
Payer: MEDICARE

## 2024-07-08 DIAGNOSIS — R47.01 APHASIA: Primary | ICD-10-CM

## 2024-07-08 DIAGNOSIS — R48.2 APRAXIA: ICD-10-CM

## 2024-07-08 NOTE — PROGRESS NOTES
Speech Language Pathology Treatment Note  [address] [place] [address]    Patient: Justin Londono                                                                                     Visit Date: 2024  :     1955    Referring practitioner:    Maite Dodd PA-C  Date of Initial Visit:          Type: THERAPY  Noted: 2024    Patient seen for 6 sessions    Visit Diagnoses:    ICD-10-CM ICD-9-CM   1. Aphasia  R47.01 784.3   2. Apraxia  R48.2 784.69     SUBJECTIVE     Patient arrived alert and ready for therapy today.     OBJECTIVE   GOALS    Goals                                         STG Comments Status   Patient will improve verbal expressive language skills by completing automatic  speech tasks, naming objects/pictures, and completing open-ended phrases/sentences Patient was able to write the alphabet with moderate cues. He would often skip a letter or series of letters but notice his mistake. When stuck he benefited from reading aloud. Patient was able to independently write numbers 1-20. During CART practice patient was able to name 8 objects verbally with moderate cues. He was able to repeat 22/25 (88%) CV words when given a model and moderate cues. He had difficulty producing -oy CV words. Ongoing   Patient will improve comprehension of spoken language by following 1 step then two step directions presented verbally and/or in writing Patient was able to follow directions in the context of tasks. He was able to remember and complete the tasks of CART training with minimal cues.     Previous: Introduction and trial of AAC. Patient demonstrated understanding and was able to complete tasks with visual and verbal cues. Ongoing   Patient will improve comprehension and expression of written language skills by complete functional reading and writing tasks Continued CART practice to improve written language skills. Patient was able  to independently write 16 words when given picture cards. CART training was implemented on 10 words that he had difficulty writing. Ongoing           LTG by:        Patient will be able to verbally and or in writing express basic wants and needs in home environment Patient was able to write words when looking at a picture. Patient continues to demonstrate severe apraxia of speech as well as aphasia but is showing progress. Ongoing   Patient will comprehend basic spoken and written information presented in home environment Patient often requires introduction to tasks before comprehending. He also presents with auditory comprehension deficits needing slower rate of speech and repetitions to comprehend others. Patient often has to be reminded to take his time when completing tasks. He demonstrates the ability to write and type words that he is not able to produce verbally. Ongoing           Total Time of Visit:             45   mins         ASSESSMENT/PLAN     ASSESSMENT: Patient was able to write numbers 1-20 independently and the alphabet with moderate cues. He was able to name some object and write 16 words independently and 10 when implementing CART training. He was able to repeat 22/25 CV words with cueing.     PLAN: Continue therapy.     Progress Note Due Date: 7/17/2024  Recertification Due Date: 9/17/2024    SIGNATURE: Danette Quiñones, Speech Therapy Student  Electronically Signed on 7/8/2024    The clinical instructor and/or supervising staff, Zabrina Bhat CCC-SLP, was present in clinic guiding the visit by approving, concurring, and confirming the skilled judgement for all services rendered.    Signature:  Zabrina Bhat CCC-SLP, [place] License #: [id number]  Electronically signed on 7/8/2024          [place] [address]  [phone]

## 2024-07-10 ENCOUNTER — TREATMENT (OUTPATIENT)
Dept: PHYSICAL THERAPY | Facility: CLINIC | Age: 69
End: 2024-07-10
Payer: MEDICARE

## 2024-07-10 DIAGNOSIS — R47.01 APHASIA: Primary | ICD-10-CM

## 2024-07-10 DIAGNOSIS — R48.2 APRAXIA: ICD-10-CM

## 2024-07-10 NOTE — PROGRESS NOTES
Speech Language Pathology Treatment Note  [address] [place] [address]    Patient: Justin Londono                                                                                     Visit Date: 7/10/2024  :     1955    Referring practitioner:    Maite Dodd PA-C  Date of Initial Visit:          Type: THERAPY  Noted: 2024    Patient seen for 7 sessions    Visit Diagnoses:    ICD-10-CM ICD-9-CM   1. Aphasia  R47.01 784.3   2. Apraxia  R48.2 784.69     SUBJECTIVE     Patient arrived alert and ready for therapy today.     OBJECTIVE   GOALS    Goals                                         STG Comments Status   Patient will improve verbal expressive language skills by completing automatic speech tasks, naming objects/pictures, and completing open-ended phrases/sentences Patient continues to present with significant deficit in verbal expression. Able to produce automatic greeting and polite responses.  Ongoing   Patient will improve comprehension of spoken language by following 1 step then two step directions presented verbally and/or in writing Patient had difficulty following one step verbal directions using actions. (Point to the floor) Patient was able to follow 17/20 verbal directions with moderate cues using picture cards and objects. (Put the fork on the table). Following directions improved throughout the session. Patient continues to need thorough introduction to tasks before being able to complete them. Ongoing   Patient will improve comprehension and expression of written language skills by complete functional reading and writing tasks Patient was able to write the alphabet with moderate cueing.  Ongoing    Patient will demonstrate understanding of and use AAC device to augment communication and express wants and needs New. AAC device was presented and patient is able to navigate to simple words on the device after model.   New   LTG by:        Patient will be able to verbally and or in writing express basic wants and needs in home environment Patient was able to navigate AAC device after model. (Food- breakfast- eggs, drinks- coffee) Spouse and patient educated on AAC device.  Ongoing   Patient will comprehend basic spoken and written information presented in home environment Patient often requires introduction to tasks before comprehending. He also presents with auditory comprehension deficits needing slower rate of speech and repetitions to comprehend others. Ongoing           Total Time of Visit:             45   mins         ASSESSMENT/PLAN     ASSESSMENT: Patient was able to write the alphabet with moderate cueing. He had difficulty completing verbal 1 step directions with actions but was able to complete 17/20 verbal directions when given pictures and objects. Patient and spouse approved trial of AAC device. SLP will contact device company.     PLAN: Continue therapy.     Progress Note Due Date: 7/17/2024  Recertification Due Date: 9/17/2024    SIGNATURE: Danette Quiñones, Speech Therapy Student  Electronically Signed on 7/10/2024    The clinical instructor and/or supervising staff, Zabrina Bhat CCC-SLP, was present in clinic guiding the visit by approving, concurring, and confirming the skilled judgement for all services rendered.    Signature:  Zabrina Bhat CCC-SLP, [place] License #: [id number]  Electronically signed on 7/10/2024          [place] [address]  [phone]

## 2024-07-15 ENCOUNTER — TREATMENT (OUTPATIENT)
Dept: PHYSICAL THERAPY | Facility: CLINIC | Age: 69
End: 2024-07-15
Payer: MEDICARE

## 2024-07-15 DIAGNOSIS — R47.01 APHASIA: Primary | ICD-10-CM

## 2024-07-15 DIAGNOSIS — R48.2 APRAXIA: ICD-10-CM

## 2024-07-15 NOTE — PROGRESS NOTES
Speech Language Pathology Treatment Note and 30 Day Progress Note  [address] [place] [address]    Patient: Justin Londono                                                                                     Visit Date: 7/15/2024  :     1955    Referring practitioner:    Maite Dodd PA-C  Date of Initial Visit:          Type: THERAPY  Noted: 2024    Patient seen for 8 sessions    Visit Diagnoses:    ICD-10-CM ICD-9-CM   1. Aphasia  R47.01 784.3   2. Apraxia  R48.2 784.69     SUBJECTIVE     Patient arrived alert and ready for therapy today.     OBJECTIVE   GOALS    Goals                                         STG Comments Status   Patient will improve verbal expressive language skills by completing automatic speech tasks, naming objects/pictures, and completing open-ended phrases/sentences Patient was able to produce greetings and automatic responses. He was able to place the days in correct order independently and was able to produce 4/7 days with minimal cues. Much improvement shown. Ongoing   Patient will improve comprehension of spoken language by following 1 step then two step directions presented verbally and/or in writing Patient able to follow directions by pointing to identify pictures. He identified 17/22 with moderate cueing. Patient was able to write the name of 7/12 pictures and was able to copy others. He was able to type 12/12 of them on the AAC device with minimal cues. Patient showed much improvement on completing a written form on personal information (name, spouse, address, phone number). Ongoing   Patient will improve comprehension and expression of written language skills by complete functional reading and writing tasks Patient was able to write the names of 7/12 pictures and was able to copy the others with minimal cues.  Ongoing    Patient will demonstrate understanding of and use AAC device to augment  communication and express wants and needs Patient was able to type 12/12 words using keyboard on AAC device. He was able to navigate categories to find items with moderate cueing.   Ongoing   LTG by:        Patient will be able to verbally and or in writing express basic wants and needs in home environment Patient was able to navigate AAC device after model using the keyboard and categories to find items shown in picture. Ongoing   Patient will comprehend basic spoken and written information presented in home environment Patient often requires introduction to tasks before comprehending. He also presents with auditory comprehension deficits needing slower rate of speech and repetitions to comprehend others. Ongoing           Total Time of Visit:             45   mins         ASSESSMENT/PLAN     ASSESSMENT: Patient was able to put days of the week in the correct order as well as produce 4/7 of the days with minimal cues. He showed much improvement with this task. He was able to follow directions and identify 17/22 pictures, write the names of them with model, and type 12/12 of them on AAC device. Patient showed much improvement today.    PLAN: Continue therapy.     Progress Note Due Date: 8/17/2024  Recertification Due Date: 9/17/2024    SIGNATURE: Danette Quiñones, Speech Therapy Student  Electronically Signed on 7/15/2024    The clinical instructor and/or supervising staff, Zabrina Bhat CCC-SLP, was present in clinic guiding the visit by approving, concurring, and confirming the skilled judgement for all services rendered.    Signature:  Zabrina Bhat CCC-SLP, [place] License #: [id number]  Electronically signed on 7/15/2024          [facility] [place] [address]  [phone]

## 2024-07-17 ENCOUNTER — TREATMENT (OUTPATIENT)
Dept: PHYSICAL THERAPY | Facility: CLINIC | Age: 69
End: 2024-07-17
Payer: MEDICARE

## 2024-07-17 DIAGNOSIS — R47.01 APHASIA: Primary | ICD-10-CM

## 2024-07-17 DIAGNOSIS — R48.2 APRAXIA: ICD-10-CM

## 2024-07-17 NOTE — PROGRESS NOTES
"                                                                  Speech Language Pathology Treatment Note and 30 Day Progress Note  [address] [place] [address]    Patient: Justin Londono                                                                                     Visit Date: 2024  :     1955    Referring practitioner:    Maite Dodd PA-C  Date of Initial Visit:          Type: THERAPY  Noted: 2024    Patient seen for 9 sessions    Visit Diagnoses:    ICD-10-CM ICD-9-CM   1. Aphasia  R47.01 784.3   2. Apraxia  R48.2 784.69     SUBJECTIVE     Patient arrived alert and ready for therapy today.     OBJECTIVE   GOALS    Goals                                         STG Comments Status   Patient will improve verbal expressive language skills by completing automatic speech tasks, naming objects/pictures, and completing open-ended phrases/sentences Patient continues to be able to produce automatic greetings and responses to greetings. Patient often produces \"good morning\" during anytime of the day. Patient was able to verbally produce CV words when given a model. He had difficulty producing /p/ CV words and perseverated on /b/ as well as words starting with vowels. Will continue to break it down and work towards this. Ongoing   Patient will improve comprehension of spoken language by following 1 step then two step directions presented verbally and/or in writing Patient was able to independently follow written directions by drawing a line to match a word to a picture at 100% accuracy. He was able to draw a line connecting the action to a picture with minimal cues at about 96%. Patient had some difficulty following verbal directions (\"put an x on the camera\") but was able to complete with moderate verbal and visual cues. Ongoing   Patient will improve comprehension and expression of written language skills by complete functional reading and writing tasks Patient was able to match pictures " to written words 100%. He was able to match picture to written phrase (function) with 95% accuracy. Ongoing   Patient will demonstrate understanding of and use AAC device to augment communication and express wants and needs Not directly addressed today.    Previous: Patient was able to type 12/12 words using keyboard on AAC device. He was able to navigate categories to find items with moderate cueing.   Ongoing   LTG by:        Patient will be able to verbally and or in writing express basic wants and needs in home environment Patient was able to produce CV words with model and his verbal and written language continue to improve. Ongoing   Patient will comprehend basic spoken and written information presented in home environment Patient continues to require introduction to tasks before comprehending. He presents with auditory comprehension deficits needing slower rate of speech and repetitions to comprehend others. Patient is able to understand written language more than verbal language. Ongoing           Total Time of Visit:             45   mins         ASSESSMENT/PLAN     ASSESSMENT: Patient was able to produce CV words with a model and cues of sounds change. He was able to follow written directions with minimal cues but had difficulty following verbal directions. He is often able to self correct or knows when he is incorrect. Patient presents with auditory comprehension deficits but continues to make progress.    PLAN: Continue therapy.     Progress Note Due Date: 8/17/2024  Recertification Due Date: 9/17/2024    SIGNATURE: Danette Quiñones, Speech Therapy Student  Electronically Signed on 7/17/2024    The clinical instructor and/or supervising staff, Zabrina Bhat CCC-SLP, was present in clinic guiding the visit by approving, concurring, and confirming the skilled judgement for all services rendered.    Signature:  Zabrina Bhat CCC-SLP, [place] License #: [id number]  Electronically signed on 7/17/2024          [address]  [place] [address]  [phone]

## 2024-07-22 ENCOUNTER — TREATMENT (OUTPATIENT)
Dept: PHYSICAL THERAPY | Facility: CLINIC | Age: 69
End: 2024-07-22
Payer: MEDICARE

## 2024-07-22 DIAGNOSIS — R48.2 APRAXIA: ICD-10-CM

## 2024-07-22 DIAGNOSIS — I63.9 CEREBROVASCULAR ACCIDENT (CVA), UNSPECIFIED MECHANISM: ICD-10-CM

## 2024-07-22 DIAGNOSIS — R47.01 APHASIA: Primary | ICD-10-CM

## 2024-07-22 DIAGNOSIS — R53.1 RIGHT SIDED WEAKNESS: Primary | ICD-10-CM

## 2024-07-22 PROCEDURE — 97166 OT EVAL MOD COMPLEX 45 MIN: CPT

## 2024-07-22 NOTE — PROGRESS NOTES
Speech Language Pathology Treatment Note and 30 Day Progress Note  [address] [place] [address]    Patient: Justin Londono                                                                                     Visit Date: 2024  :     1955    Referring practitioner:    Maite Dodd PA-C  Date of Initial Visit:          Type: THERAPY  Noted: 2024    Patient seen for 10 sessions    Visit Diagnoses:    ICD-10-CM ICD-9-CM   1. Aphasia  R47.01 784.3   2. Apraxia  R48.2 784.69     SUBJECTIVE     Patient arrived alert and ready for therapy today.     OBJECTIVE   GOALS    Goals                                         STG Comments Status   Patient will improve verbal expressive language skills by completing automatic speech tasks, naming objects/pictures, and completing open-ended phrases/sentences Patient was able to verbally produce and type names of objects using the AAC device keyboard with moderate and maximum cues. He had some difficulty typing today. Ongoing   Patient will improve comprehension of spoken language by following 1 step then two step directions presented verbally and/or in writing Patient was able to follow one step directions at 100% accuracy with minimal cues. He was able to follow 16/18 (88%) two step verbal directions with minimum cues. (Point to the arrow then the suitcase.) He was able to identify objects by their function at about 85% accuracy with mod cues. (Point to the one you drive) Ongoing   Patient will improve comprehension and expression of written language skills by complete functional reading and writing tasks Patient was able to independently select 20/20 (100%) sentences that described each picture. He was able to self correct. Ongoing   Patient will demonstrate understanding of and use AAC device to augment communication and express wants and needs Patient used AAC keyboard to type the name of 17  objects with moderate/maximum cues. He had some difficulty typing today and perseverated on previous items.  Ongoing   LTG by:        Patient will be able to verbally and or in writing express basic wants and needs in home environment Patient was able to verbally produce and type to name of some items using the AAC keyboard. Ongoing   Patient will comprehend basic spoken and written information presented in home environment Patient continues to require introduction to tasks before comprehending. He presents with auditory comprehension deficits needing slower rate of speech and repetitions to comprehend others. He often needs reminders of the task or object of focus. Patient is able to understand written language more than verbal language. Ongoing           Total Time of Visit:             45   mins         ASSESSMENT/PLAN     ASSESSMENT: Patient was able to verbally produce and type names of objects using the AAC device. He had some difficulty typing but is able to navigate the device finding the item on the list even if he has misspelled the word. He was able to follow one and two step directions and able to select written sentences that described each picture with little difficulty.    PLAN: Continue therapy.     Progress Note Due Date: 8/17/2024  Recertification Due Date: 9/17/2024    SIGNATURE: Danette Quiñones, Speech Therapy Student  Electronically Signed on 7/22/2024    The clinical instructor and/or supervising staff, Zabrina Bhat CCC-SLP, was present in clinic guiding the visit by approving, concurring, and confirming the skilled judgement for all services rendered.    Signature:  Zabrina Bhat CCC-SLP, [place] License #: [id number]  Electronically signed on 7/22/2024          [facility] [place] [address]  [phone]

## 2024-07-22 NOTE — PROGRESS NOTES
"                                                          Occupational Therapy Initial Evaluation and Plan of Care  [address] [place] [address]    Patient: Justin Londono   : 1955  Referring practitioner: DOROTA Mckee  Date of Initial Visit: 2024  Today's Date: 2024  Patient seen for 1 sessions    Visit Diagnoses:    ICD-10-CM ICD-9-CM   1. Right sided weakness  R53.1 728.87   2. Cerebrovascular accident (CVA), unspecified mechanism  I63.9 434.91     Past Medical History:   Diagnosis Date    Bleeding tendency     Hyperlipidemia     Stroke      Past Surgical History:   Procedure Laterality Date    CERVICAL DISC ARTHROPLASTY      2024    THROMBECTOMY  2024    \"clot removed from brain\"    TOTAL HIP ARTHROPLASTY               SUBJECTIVE     Subjective Evaluation    History of Present Illness  Date of onset: 2024  Mechanism of injury:  is s/p ACDF C3-6 from 2024, during which he unfortunately suffered an intraoperative stroke. Pt continues to have residual deficits from the stroke, including expressive aphasia and r-sided weakness. Pt reports that recently he had repeat imaging of his neck which showed DDD and spinal stenosis in the cervical region. Pt reports he has needed a R TKA for a long time but is waiting d/t the stroke. Today the pt's wife, Lisy, attended and helped with reporting during the OT eval. The patient's living arrangement includes home with family member. Their home accessibility includes walk-in shower, 2 steps to bed/bath with no handrail, 2 CASSIDY with no handrail. Pt reports he struggles with stairs and goes down them backwards for balance. Their home assistive devices and adaptive equipment includes Dressing Aides, Type: sock-aid. Pt reports prior to the stroke he was independent with all ADL and IADLs.      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 Quality of life: fair    Pain  Current pain " ratin    Social Support  Patient lives at: [facility].  Lives with: spouse    Hand dominance: right    Diagnostic Tests  X-ray: abnormal  CT scan: abnormal (Cervical DDD and spinal stenosis)    Treatments  Previous treatment: speech therapy and physical therapy  Current treatment: speech therapy  Patient Goals  Patient goals for therapy: decreased pain, increased strength, independence with ADLs/IADLs, return to sport/leisure activities and increased motion         Outcome Measure:   9 Hole Peg Test: Left: 28.86 s Right: 25.65 s  PT G-Codes  Outcome Measure Options: Quick DASH  Quick DASH Score: 56.82    OBJECTIVE     Objective          Active Range of Motion   Left Shoulder   Normal active range of motion    Right Shoulder   Flexion: 110 degrees   Extension: WFL  Abduction: 105 degrees   Adduction: WFL    Left Elbow   Normal active range of motion    Right Elbow   Normal active range of motion    Left Wrist   Normal active range of motion    Right Wrist   Normal active range of motion    Additional Active Range of Motion Details  B hands ROM grossly WFL     Strength/Myotome Testing     Left Shoulder   Normal muscle strength    Right Shoulder     Planes of Motion   Flexion: 4-   Extension: 4     Left Elbow   Normal strength    Right Elbow   Normal strength    Left Wrist/Hand   Normal wrist strength     (2nd hand position)     Trial 1: 47 lbs    Trial 2: 45 lbs    Trial 3: 42 lbs    Average: 44.67 lbs    Thumb Strength  Key/Lateral Pinch     Trial 1: 22 lbs    Trial 2: 24 lbs    Trial 3: 23 lbs    Average: 23 lbs  Tip/Two-Point Pinch     Trial 1: 15 lbs    Trial 2: 10 lbs    Trial 3: 10 lbs    Average: 11.67 lbs  Palmar/Three-Point Pinch     Trial 1: 18 lbs    Trial 2: 17 lbs    Trial 3: 16 lbs    Average: 17 lbs    Right Wrist/Hand   Normal wrist strength     (2nd hand position)     Trial 1: 45 lbs    Trial 2: 40 lbs    Trial 3: 45 lbs    Average: 43.33 lbs    Thumb Strength   Key/Lateral Pinch     Trial  1: 16 lbs    Trial 2: 17 lbs    Trial 3: 20 lbs    Average: 17.67 lbs  Tip/Two-Point Pinch     Trial 1: 12 lbs    Trial 2: 14 lbs    Trial 3: 11 lbs    Average: 12.33 lbs  Palmar/Three-Point Pinch     Trial 1: 14 lbs    Trial 2: 11 lbs    Trial 3: 16 lbs    Average: 13.67 lbs      Vision assessment: normal and wears corrected lenses/contact.    Cognitive status: oriented to Person, Place, Time, and Situation, safety/judgement: fair, and awareness of deficits: fair awareness of safety precautions and fair awareness of deficits    Sensation: Impaired sensation in R hand. Pt reports tingling in R hand digits 2-4 (digit 3 being the worst). Pt reports R hand is more sensitive than L to hot/cold temperatures.     Cranial Nerves: normal    Reflexes: reflexes are normal and symmetric    Tone: Grossly Normal    Midline orientation: Midline    Fine Motor:   Rapid alternating movements of the hands, fingers, and forearms: Able to perform  Tip Pinch: right Able to perform, but weak  3 Jaw Andres: right Able to perform, but weak  Lateral Pinch: right Able to perform, but weak    ADL Assessment:  Upper Body Dressing: Able to perform  Lower Body Dressing: Able to perform  Dressing Assistive Devices: sock-aid  Grooming Comments: Independent  Toileting Comments: Independent   Eating--Utensils Indepedence: Able to perform, but reports moderate spilling and dropping.   Bathing Level of Rico: Able to perform    IADL:   Driving: N/A d/t Dr orders.   Shopping: Min A, Pt reports improvement with navigating stores and locating items when grocery shopping.   Hobbies: Pt reports no participation in leisure activities.   Cleaning: Mod-Max A with light-moderate household tasks   Cooking: Independent; Pt reports he grills.     Cerebellar exam: no tremors noted      Therapy Education/Self Care [id number]   Education offered today Discussed purpose and benefits of OT services with pt and caregiver. Outlined pt's concerns and goals for therapy.     Merte Code    Ongoing HEP      Timed Minutes        Total Timed Treatment:     0   mins  Total Time of Visit:            47   mins    ASSESSMENT/PLAN     GOALS:  Goals                                          Progress Note due by 8/21/24                                                      Recert due by 10/19/24   STG by: 8/21/24 Comments Date Status   Pt will be independent with daily completion of a HEP to address stroke deficits affecting IADL performance.       Pt will be independent with simulated light-moderate IADL tasks demonstrating good safety techniques.       Pt will display improved B UE FMC by completing the 9 hole peg test in 20 seconds or less with R hand and 22 seconds or less with L hand.             LTG by: 8/21/24      Pt will improve B hand  strength to 60 lbs with no report of pain.       Pt will display appropriate motor coordination, visual scanning and reaction time for IADL performance by passing all 4 assessments on the [employer] BITS.       Pt will improve B three point pinch strength to 21 lbs with no report of pain.                               Assessment & Plan       Assessment  Impairments: abnormal or restricted ROM, impaired balance, impaired physical strength, lacks appropriate home exercise program and pain with function   Functional limitations: carrying objects, lifting, sleeping, reaching behind back and reaching overhead   Assessment details: Pt presents s/p intraoperative CVA during ACDF C3-6 (2/1/24), with residual deficits including expressive aphasia and R-sided weakness. Pt demonstrates slightly impaired FMC in B hands. Pt c/o FM deficits in R hand during ADL/IADL tasks. Pt demonstrates significantly impaired  strength in B hands, and some impairments in pinch strengths in B hands. Pt's R shoulder ROM is impaired and has been negatively affecting his performance during IADLs. 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 He wishes to get back to working and driving to take care of his wife and resume traveling. Pt would benefit from skilled OT services 1x per week to address his R UE deficits and improve IADL performance.   Prognosis: good    Plan  Therapy options: will be seen for skilled therapy services  Planned therapy interventions: ADL retraining, motor coordination training, fine motor coordination training, functional ROM exercises, strengthening, home exercise program, therapeutic activities and IADL retraining  Frequency: 1x week  Duration in weeks: 12  Treatment plan discussed with: patient and caregiver  Plan details: Will focus on B hand FMC, R shoulder strengthening, R shoulder ROM, B hand strengthening, and IADL performance.         SIGNATURE: Cristela Bey OT, [place] License #: [id number]  Electronically Signed on 7/22/2024    Initial Certification  Certification Period: 7/22/2024 through 10/19/2024  I certify that the therapy services are furnished while this patient is under my care.  The services outlined above are required by this patient, and will be reviewed every 90 days.     PHYSICIAN: Sharmin Bright APRN (NPI: [id number])    Signature: _______________________________________DATE: _________    Please sign and return via fax to [phone].         [address] [address]  [phone]    "

## 2024-07-24 ENCOUNTER — TREATMENT (OUTPATIENT)
Dept: PHYSICAL THERAPY | Facility: CLINIC | Age: 69
End: 2024-07-24
Payer: MEDICARE

## 2024-07-24 DIAGNOSIS — R47.01 APHASIA: Primary | ICD-10-CM

## 2024-07-24 DIAGNOSIS — R48.2 APRAXIA: ICD-10-CM

## 2024-07-24 NOTE — PROGRESS NOTES
"                                                                  Speech Language Pathology Treatment Note and 30 Day Progress Note  [address] [place] [address]    Patient: Justin Londono                                                                                     Visit Date: 2024  :     1955    Referring practitioner:    Maite Dodd PA-C  Date of Initial Visit:          Type: THERAPY  Noted: 2024    Patient seen for 11 sessions    Visit Diagnoses:    ICD-10-CM ICD-9-CM   1. Aphasia  R47.01 784.3   2. Apraxia  R48.2 784.69     SUBJECTIVE     Patient arrived alert and ready for therapy today.     OBJECTIVE   GOALS    Goals                                         STG Comments Status   Patient will improve verbal expressive language skills by completing automatic speech tasks, naming objects/pictures, and completing open-ended phrases/sentences Patient was able to verbally produce and type names of objects using the AAC device keyboard with moderate and maximum cues. Patient was able to verbally produce 4 words, type 14 with moderate cues, and incorrectly typed 3 words. Ongoing   Patient will improve comprehension of spoken language by following 1 step then two step directions presented verbally and/or in writing Patient was able to follow one step directions given verbally with a visual at 100% accuracy with minimal cues. He had difficulty following verbal directions with no visual. (\"Point to your nose\") Patient somewhat benefited from seeing the objects in a mirror. He was able to follow 85% of two step directions given verbally with a visual with moderate cues. (Point to the car then the door.) He was able to identify objects by their function at about 92% accuracy with moderate cues. (Point to the one that you drive)  Ongoing   Patient will improve comprehension and expression of written language skills by complete functional reading and writing tasks Patient was able to " individually write the names of 9/14 objects. CART training was implemented on the other 5 words with minimal cues. Ongoing   Patient will demonstrate understanding of and use AAC device to augment communication and express wants and needs Patient used AAC keyboard to type the name of words that he could not verbally produce with moderate/maximum cues.  Ongoing   LTG by:        Patient will be able to verbally and or in writing express basic wants and needs in home environment Patient was able to verbally produce and type to name of some items using the AAC keyboard. Ongoing   Patient will comprehend basic spoken and written information presented in home environment Patient continues to require introduction to tasks before comprehending. He presents with auditory comprehension deficits needing slower rate of speech and repetitions to comprehend others. He often needs reminders of the task or object of focus. Patient is able to understand written language more than verbal language. Ongoing           Total Time of Visit:             45   mins         ASSESSMENT/PLAN     ASSESSMENT: Patient was able to verbally produce and type names of objects using the AAC device. He was able to follow one step directions when given a visual at 100% accuracy and two step directions with a visual at 85%. Patient has difficulty following verbal directions (point to your nose, open your mouth). He somewhat benefited from the use of a mirror but still had some difficulty.    PLAN: Continue therapy.     Progress Note Due Date: 8/17/2024  Recertification Due Date: 9/17/2024    SIGNATURE: Danette Quiñones, Speech Therapy Student  Electronically Signed on 7/24/2024    The clinical instructor and/or supervising staff, Zabrina Bhat CCC-SLP, was present in clinic guiding the visit by approving, concurring, and confirming the skilled judgement for all services rendered.    Signature:  Zabrina Bhat CCC-SLP, [place] License #:  [id number]  Electronically signed on 7/24/2024          [address] [place] [address]  [phone]

## 2024-07-29 ENCOUNTER — TREATMENT (OUTPATIENT)
Dept: PHYSICAL THERAPY | Facility: CLINIC | Age: 69
End: 2024-07-29
Payer: MEDICARE

## 2024-07-29 DIAGNOSIS — R48.2 APRAXIA: ICD-10-CM

## 2024-07-29 DIAGNOSIS — R53.1 RIGHT SIDED WEAKNESS: ICD-10-CM

## 2024-07-29 DIAGNOSIS — I63.9 CEREBROVASCULAR ACCIDENT (CVA), UNSPECIFIED MECHANISM: Primary | ICD-10-CM

## 2024-07-29 DIAGNOSIS — R47.01 APHASIA: Primary | ICD-10-CM

## 2024-07-29 PROCEDURE — 92507 TX SP LANG VOICE COMM INDIV: CPT | Performed by: SPEECH-LANGUAGE PATHOLOGIST

## 2024-07-29 PROCEDURE — 97530 THERAPEUTIC ACTIVITIES: CPT

## 2024-07-29 PROCEDURE — 97110 THERAPEUTIC EXERCISES: CPT

## 2024-07-29 NOTE — PROGRESS NOTES
Occupational Therapy Treatment Note  [address], [place] [address]    Patient: Justin Londono                                                 Visit Date: 2024  :     1955    Referring practitioner:    DOROTA Mckee  Date of Initial Visit:          Type: THERAPY  Noted: 2024    Patient seen for 2 sessions    Visit Diagnoses:    ICD-10-CM ICD-9-CM   1. Cerebrovascular accident (CVA), unspecified mechanism  I63.9 434.91   2. Right sided weakness  R53.1 728.87     SUBJECTIVE     Subjective:  Pt reports pain in the R LE, but states this is constant and has been present since his stroke. Pt reports   He is having a good week so far and is happy to be getting OT services now.     PAIN: 10, R LE        OBJECTIVE     Objective   Dysmetria Assessment: L side worse than right   Proprioception Assessment: WNL     Therapy Education/Self Care 90987   Education offered today Reviewed and practiced thera-putty exercises for R hand strengthening.    Medbride Code    Ongoing HEP   Thera-putty exercises (Green Thera-Putty)    Timed Minutes      Therapeutic Activities    [id number] Comments   Trail Making Part A completed with the R UE. 1 errors, 2 interruptions and a time of 0:46. Part B completed with 4 errors, 15 interruptions and a time of 1:54, with mod cues for attention.  Bell Cancellation Test completed with the R UE in a time of 1:47 with 2 errors and 2 distractor hits.   Computerized Maze Assessment completed with the R UE in a time of 0:31 with 3 errors.   Visual Scanning and Motor Reaction Assessment completed with the R UE. Level 1 41/50 required targets.  Pt was able to pass 0/4 assessment levels.    Remainder of BITS activities focused on R UE AROM and coordination. Accuracy ranged from 70.27 to 81.08%. Reaction time was 5.31 to 2.52 seconds.        Timed Minutes 30     Therapeutic Exercises    71408 Units Comments    Pt  completed R hand power grasp using 7# digi-flex completing 1 set of 20 reps each.      Pt completed R thumb strengthening performing lateral pinch with black stress ball, 1 set 20 reps.      Pt performed R hand strengthening thera-putty activities (green). Completing whole hand grasp, finger hook grasp, and pinch with each digit.                Timed Minutes 15     Total Timed Treatment:     45   mins  Total Time of Visit:             45   mins         ASSESSMENT/PLAN     GOALS:        Goals                                          Progress Note due by 8/21/24                                                      Recert due by 10/19/24   STG by: 8/21/24 Comments Date Status   Pt will be independent with daily completion of a HEP to address stroke deficits affecting IADL performance.   Thera-putty exercises added to HEP        Pt will be independent with simulated light-moderate IADL tasks demonstrating good safety techniques.          Pt will display improved B UE FMC by completing the 9 hole peg test in 20 seconds or less with R hand and 22 seconds or less with L hand.                    LTG by: 8/21/24         Pt will improve B hand  strength to 60 lbs with no report of pain.   7# Digi flex 1 set 20 reps        Pt will display appropriate motor coordination, visual scanning and reaction time for IADL performance by passing all 4 assessments on the [employer] Program.  Pt was unable to pass any of the 4 assessments. See table above for individual scores.          Pt will improve B three point pinch strength to 21 lbs with no report of pain.   Green thera-putty exercises                                         Assessment/Plan     ASSESSMENT:   Pt completed all activities during the session with minimal pain in R shoulder and arm.   Pt continues to demo weakness in R hand  and thumb strength, but was happy to get   HEP exercises to work on this at home. Pt struggled with completing BITS assessments.   Pt demo'd  improvements during the BITS treatment activities. Pt continues to struggle   With communication during sessions and often times gets frustrated when trying to   Explain things.     PLAN:   Will address FM deficits, impairments in IADL performance, and pinch strength.     SIGNATURE: Cristela Bey OT, [place] License #: [id number]  Electronically Signed on 7/29/2024        [address] [address]  [phone]

## 2024-07-29 NOTE — PROGRESS NOTES
"                                                                  Speech Language Pathology Treatment Note  [address] [place] [address]    Patient: Justin Londono                                                                                     Visit Date: 2024  :     1955    Referring practitioner:    Maite Dodd PA-C  Date of Initial Visit:          Type: THERAPY  Noted: 2024    Patient seen for 12 sessions    Visit Diagnoses:    ICD-10-CM ICD-9-CM   1. Aphasia  R47.01 784.3   2. Apraxia  R48.2 784.69       SUBJECTIVE     Patient arrived alert and ready for therapy today.     OBJECTIVE   GOALS    Goals                                         STG Comments Status   Patient will improve verbal expressive language skills by completing automatic speech tasks, naming objects/pictures, and completing open-ended phrases/sentences Patient was able to place days of the week in order as well as verbally produce them with little difficulty. He was able to type the names of 28/30 words when given a carrier phrase and picture using the AAC device with minimal cues. Patient typed the other 2 words incorrectly but was able to find the picture. Patient had a little more difficulty when given just a picture with no carrier phrase. He was able to type 6/8 and 2/8 with max cues. Ongoing   Patient will improve comprehension of spoken language by following 1 step then two step directions presented verbally and/or in writing Patient showed much improvement with following directions today. He was able to follow 9/10 directions with no visual (\"point to your nose\", \"point to your eyes\") Patient was able to follow two step directions at 94% accuracy when given a visual (\"point to the suitcase and the needle.\") Ongoing   Patient will improve comprehension and expression of written language skills by complete functional reading and writing tasks Patient was able to read and place days of the week and months " in correct order. Ongoing   Patient will demonstrate understanding of and use AAC device to augment communication and express wants and needs Patient used AAC keyboard to type the name of words or pictures.  Ongoing   LTG by:        Patient will be able to verbally and or in writing express basic wants and needs in home environment Patient was able to type the name of some items using the AAC keyboard. Ongoing   Patient will comprehend basic spoken and written information presented in home environment Patient continues to require introduction to tasks before comprehending. He presents with auditory comprehension deficits needing slower rate of speech and repetitions to comprehend others. He often needs reminders of the task or object of focus. Patient is able to understand written language more than verbal language. Ongoing           Total Time of Visit:             45   mins         ASSESSMENT/PLAN     ASSESSMENT: Patient was able to type names of objects using the AAC device. He showed much improvement with following verbal direction tasks that don't include a picture (point to your nose, open your mouth). He was able to put the days of the week in order and produce them verbally.    PLAN: Continue therapy.     Progress Note Due Date: 8/17/2024  Recertification Due Date: 9/17/2024    SIGNATURE: Danette Quiñones, Speech Therapy Student  Electronically Signed on 7/31/2024    The clinical instructor and/or supervising staff, Zabrina Bhat CCC-SLP, was present in clinic guiding the visit by approving, concurring, and confirming the skilled judgement for all services rendered.    Signature:  Zabrina Bhat CCC-SLP, [place] License #: [id number]  Electronically signed on 7/31/2024          [facility] [address] [address]  [phone]

## 2024-07-31 ENCOUNTER — PATIENT ROUNDING (BHMG ONLY) (OUTPATIENT)
Dept: NEUROLOGY | Facility: CLINIC | Age: 69
End: 2024-07-31
Payer: MEDICARE

## 2024-07-31 ENCOUNTER — TREATMENT (OUTPATIENT)
Dept: PHYSICAL THERAPY | Facility: CLINIC | Age: 69
End: 2024-07-31
Payer: MEDICARE

## 2024-07-31 ENCOUNTER — OFFICE VISIT (OUTPATIENT)
Dept: NEUROLOGY | Facility: CLINIC | Age: 69
End: 2024-07-31
Payer: MEDICARE

## 2024-07-31 VITALS
RESPIRATION RATE: 18 BRPM | BODY MASS INDEX: 34.96 KG/M2 | HEART RATE: 62 BPM | DIASTOLIC BLOOD PRESSURE: 72 MMHG | WEIGHT: 236 LBS | SYSTOLIC BLOOD PRESSURE: 126 MMHG | HEIGHT: 69 IN

## 2024-07-31 DIAGNOSIS — R47.01 EXPRESSIVE APHASIA: ICD-10-CM

## 2024-07-31 DIAGNOSIS — R47.1 DYSARTHRIA: ICD-10-CM

## 2024-07-31 DIAGNOSIS — R47.01 APHASIA: Primary | ICD-10-CM

## 2024-07-31 DIAGNOSIS — Z98.1 STATUS POST CERVICAL SPINAL FUSION: ICD-10-CM

## 2024-07-31 DIAGNOSIS — E78.5 HYPERLIPIDEMIA LDL GOAL <70: ICD-10-CM

## 2024-07-31 DIAGNOSIS — R44.9 RIGHT-SIDED SENSORY DEFICIT PRESENT: ICD-10-CM

## 2024-07-31 DIAGNOSIS — R48.2 APRAXIA: ICD-10-CM

## 2024-07-31 DIAGNOSIS — R53.1 RIGHT SIDED WEAKNESS: ICD-10-CM

## 2024-07-31 DIAGNOSIS — I69.30 HISTORY OF STROKE WITH RESIDUAL DEFICIT: Primary | ICD-10-CM

## 2024-07-31 NOTE — PROGRESS NOTES
July 31, 2024        My name is Kayla Reed LPN      I am  with [facility]UR[facility]  [address]  [address]  [facility] [address] [phone].    Before we get started may I verify your date of birth? 1955    I am  officially welcoming you to our practice and asking about your visit. Is this a good time to talk? yes    Tell me about your visit with us. What things went well?  Patient states everything went well.  He is happy with his plan of care.       We're always looking for ways to make our patients' experiences even better. Do you have recommendations on ways we may improve?  no    Overall were you satisfied with your first visit to our practice? yes       I appreciate you taking the time to speak with me today. Is there anything else I can do for you? no      Thank you, and have a great day.

## 2024-07-31 NOTE — PROGRESS NOTES
"                                                                  Speech Language Pathology Treatment Note  [address] [place] [address]    Patient: Justin Londono                                                                                     Visit Date: 2024  :     1955    Referring practitioner:    Maite Dodd PA-C  Date of Initial Visit:          Type: THERAPY  Noted: 2024    Patient seen for 13 sessions    Visit Diagnoses:    ICD-10-CM ICD-9-CM   1. Aphasia  R47.01 784.3   2. Apraxia  R48.2 784.69       SUBJECTIVE     Patient arrived alert and ready for therapy today.     OBJECTIVE   GOALS    Goals                                         STG Comments Status   Patient will improve verbal expressive language skills by completing automatic speech tasks, naming objects/pictures, and completing open-ended phrases/sentences Patient continues to be able to properly respond to and express greetings. Ongoing   Patient will improve comprehension of spoken language by following 1 step then two step directions presented verbally and/or in writing Patient was able to follow about 95% of one and two step directions with objects and different tasks. (\"Point to ___, Give me ___\") He was also able to follow verbal directions by pointing to a certain letter. Ongoing   Patient will improve comprehension and expression of written language skills by complete functional reading and writing tasks Patient was able to match words to the corresponding object at 100% accuracy. He was able to choose sentences that described a picture at 100% accuracy. He was able to identify the correct object when given a phrase with the function or description at about 98% with minimal cues. Patient had some difficulty choosing the correct object based on the attribute and what the object is not. He required moderate written and verbal cues but was able to complete. Ongoing   Patient will demonstrate understanding of " and use AAC device to augment communication and express wants and needs AAC device was not used today.    Previous: Patient used AAC keyboard to type the name of words or pictures.  Ongoing   LTG by:        Patient will be able to verbally and or in writing express basic wants and needs in home environment Patient continues to show progress with verbal and written language. In the process of getting an AAC device. Ongoing   Patient will comprehend basic spoken and written information presented in home environment Patient continues to require introduction to tasks before comprehending. He presents with auditory comprehension deficits needing slower rate of speech and repetitions to comprehend others. He often needs reminders of the task or object of focus. Patient is able to understand written language more than verbal language. Ongoing           Total Time of Visit:             45   mins         ASSESSMENT/PLAN     ASSESSMENT: Patient was able to follow one and two step directions. He continues to show much progress with these tasks. Patient expressed some concern with reading difficulty. Some previous reading tasks were introduced to see if there was any decline and patient showed progress.    PLAN: Continue therapy.     Progress Note Due Date: 8/17/2024  Recertification Due Date: 9/17/2024    SIGNATURE: Danette Quiñones, Speech Therapy Student  Electronically Signed on 7/31/2024    The clinical instructor and/or supervising staff, Zabrina Bhat CCC-SLP, was present in clinic guiding the visit by approving, concurring, and confirming the skilled judgement for all services rendered.    Signature:  Zabrina Bhat CCC-SLP, [place] License #: [id number]  Electronically signed on 7/31/2024          [address] [place] [address]  [place] [address]  [phone]

## 2024-07-31 NOTE — PROGRESS NOTES
Neurology Consult Note    [facility]  [address], [address]  [place] [address]  Phone: [phone]  Fax: [phone]    Referring Provider:   DOROTA Mckee  Primary Care Provider:  Sharmin Bright APRN    Reason for Consult:  History of stroke  Subjective        Justin Londono presents to [facility]    History of Present Illness  69-year-old male referred to our clinic for the evaluation of stroke.  Review of his record shows patient underwent a cervical spine procedure involving C3-C5.  Probably will have an anterior cervical discectomy with fusion.  His procedure occurred on 2/1/2024.  Reported during the procedure everything was unremarkable.  However during the postoperative period patient was noted to be slow to awaken.  Patient was noted be moving his left upper and left lower extremity more than his right.  He also had facial weakness with grimace to pain.  Patient was Narcan.  No improvement.  Reportedly a stroke call was activated.    An MRI of his brain revealed multifocal left hemispheric infarctions extending across the posterior left parietal lobe into the lateral left occipital lobe, posterior left insula and superior left temporal lobe.  There is also an area along the left caudate head.    Unfortunately we do not have full records to include neurology evaluation or complete workup.    Patient did also see our neurosurgery department on 6/17/2024.  Review that note shows patient and his wife reside in [facility] 6 months of the year another 6 months they reside in [place].  Her note noted patient stayed with an LTAC facility for 2 weeks in 6 weeks at a SNF.  Residual deficits include expressive aphasia.  Reportedly patient is maintained on Plavix.  Patient has been set up with speech therapies.    Patient is establish care with DOROTA Almendarez on 7/1/2024.  She did note continued right-sided weakness.  They also noted  increased fatigue during the day.  Patient is sleeping more than usual.  Patient has been managed on Plavix and atorvastatin.    Today patient presents with his spouse.  He is right-hand dominant reports no new stroke symptoms.  Patient is been compliant with dual and platelet therapy as well as atorvastatin.  They do report patient has been set up with speech therapy and occupational therapy.  He is not currently going through physical therapy.  However after his stroke in February he did receive extensive therapies while in an LTAC facility and skilled nursing facility.  Residual deficits to include right-sided weakness as well as expressive aphasia.    Patient spouse does endorse that he had a cervical spine surgery.  When he woke up he had the deficits.  Patient did undergo a thrombectomy procedure.  He had no follow-up afterwards with neurology or neurosurgery.  He has seen neurosurgery here locally.  He is not currently driving.  Patient Active Problem List   Diagnosis    DDD (degenerative disc disease), cervical    Spinal stenosis in cervical region    Nonsmoker    Class 2 obesity due to excess calories without serious comorbidity with body mass index (BMI) of 35.0 to 35.9 in adult    Expressive aphasia    Mixed hyperlipidemia    Right sided weakness    History of stroke        Past Medical History:   Diagnosis Date    Bleeding tendency     Hyperlipidemia     Stroke         Social History     Socioeconomic History    Marital status:    Tobacco Use    Smoking status: Former     Types: Cigarettes    Tobacco comments:     Pt quit smoking in 1990   Vaping Use    Vaping status: Never Used   Substance and Sexual Activity    Alcohol use: Yes     Comment: 10 drinks weekly    Drug use: Defer    Sexual activity: Defer     Partners: Female        No Known Allergies       Current Outpatient Medications:     aspirin 81 MG EC tablet, Take 1 tablet by mouth Daily., Disp: , Rfl:     atorvastatin (LIPITOR) 40 MG  "tablet, Take 1 tablet by mouth Every Night for 180 days., Disp: 90 tablet, Rfl: 1    clopidogrel (PLAVIX) 75 MG tablet, Take 1 tablet by mouth Daily for 180 days., Disp: 90 tablet, Rfl: 1       Objective   Vital Signs:   /72 (BP Location: Left arm, Patient Position: Sitting)   Pulse 62   Resp 18   Ht 175.3 cm (69\")   Wt 107 kg (236 lb)   BMI 34.85 kg/m²       Physical Exam  Vitals and nursing note reviewed.   Constitutional:       Appearance: Normal appearance.   HENT:      Head: Normocephalic.   Eyes:      General: Lids are normal.      Extraocular Movements: Extraocular movements intact.      Pupils: Pupils are equal, round, and reactive to light.   Neck:      Vascular: No carotid bruit.   Cardiovascular:      Rate and Rhythm: Normal rate and regular rhythm.      Heart sounds: Normal heart sounds.   Pulmonary:      Effort: Pulmonary effort is normal. No respiratory distress.   Skin:     General: Skin is warm and dry.   Neurological:      Mental Status: He is alert.      Cranial Nerves: Dysarthria present.      Deep Tendon Reflexes: Reflexes are normal and symmetric.        Neurological Exam  Mental Status  Alert. Oriented to person, place, time and situation. Mild dysarthria present. Expressive aphasia present.    Cranial Nerves  CN II: Visual fields full to confrontation.  CN III, IV, VI: Extraocular movements intact bilaterally. Normal lids and orbits bilaterally. Pupils equal round and reactive to light bilaterally.  CN V: Facial sensation is normal.  CN VII: Full and symmetric facial movement.  CN IX, X: Palate elevates symmetrically. Normal gag reflex.  CN XI: Shoulder shrug strength is normal.  CN XII: Tongue midline without atrophy or fasciculations.    Motor  Normal muscle bulk throughout. No fasciculations present. Normal muscle tone. No abnormal involuntary movements.                                               Right                     Left   Shoulder abduction               5-              "             5  Elbow flexion                         5-                          5  Elbow extension                    5-                          5  Wrist flexion                           5-                          5  Wrist extension                      5-                          5  Supination                             5                          5  Pronation                               5-                          5  Finger abduction                    5                          5  Hip flexion                              5                          5  Knee flexion                           5-                          5  Knee extension                      5-                          5  Dorsiflexion                            5-                          5    Sensory  Light touch abnormality: Decreased sensory right side of body.     Reflexes  Deep tendon reflexes are 2+ and symmetric in all four extremities.    Coordination  Right: Finger-to-nose normal.Left: Finger-to-nose normal.  No ataxia.    Gait  Casual gait is normal including stance, stride, and arm swing.      Result Review :   The following data was reviewed by: DOROTA Nguyen on 07/31/2024:  CMP          4/10/2024    15:06 7/1/2024    11:10   CMP   Glucose 96     107    BUN 11.0     12    Creatinine 1.12     0.97    EGFR 71.1     84.5    Sodium 143     137    Potassium 4.7     4.5    Chloride 106     102    Calcium 8.9     9.2    Total Protein 6.7     7.4    Albumin 3.0     4.1    Globulin  3.3    Total Bilirubin 0.3     0.4    Alkaline Phosphatase 83     83    AST (SGOT) 16     17    ALT (SGPT) 23     16    Albumin/Globulin Ratio  1.2    BUN/Creatinine Ratio 9.8     12.4    Anion Gap 7.9     7.0       Details          This result is from an external source.             CBC w/diff          7/1/2024    11:10   CBC w/Diff   WBC 5.77    RBC 4.30    Hemoglobin 13.6    Hematocrit 41.6    MCV 96.7    MCH 31.6    MCHC 32.7    RDW 13.6    Platelets  226      Lipid Panel          7/1/2024    11:10   Lipid Panel   Total Cholesterol 145    Triglycerides 98    HDL Cholesterol 42    VLDL Cholesterol 18    LDL Cholesterol  85    LDL/HDL Ratio 1.99      TSH          7/1/2024    11:10   TSH   TSH 4.160      RADIOLOGY - SCAN - MRI BRAIN WO_[facility]_02/01/24 (02/01/2024)    RADIOLOGY - SCAN - CT BRAIN W/PERFUSION_[facility]_02/01/24 (02/01/2024)    RADIOLOGY - SCAN - CT HEAD WO_[facility]_02/02/24 (02/01/2024)    OPERATIVE/PROCEDURE REPORT - SCAN - OP NOTE_[facility]_02/01/24 (02/01/2024)     Progress Notes by Maite Dodd PAChachaC (06/17/2024 10:15)  Progress Notes by Zabrina Bhat CCC-SLP (06/20/2024 08:00)  Progress Notes by Sharmin Bright APRN (07/01/2024 10:00)                    Impression:  Justin oLndono is a 69 y.o. male who presents for establishment of care in regards to history of stroke.  Patient did experience a procedural stroke after undergoing cervical spine surgery.  Based off report that I have an MRI does sound embolic in nature.  Patient did undergo a thrombectomy procedure.  Unfortunate do not have full records from that hospitalization to help guide care.  Currently I will recommend patient to continue dual platelet therapy as well as atorvastatin.  Patient would benefit from continued rehabilitation services.  I did request patient not to drive at this time due to communication issues.  Recommend continuing secondary stroke preventative strategies.    Diagnoses and all orders for this visit:    1. History of stroke with residual deficit (Primary)    2. Right sided weakness    3. Right-sided sensory deficit present    4. Dysarthria    5. Expressive aphasia    6. Hyperlipidemia LDL goal <70    7. Status post cervical spinal fusion        Plan:  Continue aspirin 81 mg daily  Continue clopidogrel 75 mg daily  Continue atorvastatin 40 mg daily  LDL goal less than  70  A1c goal less than 6.5  BP goal less than 130/80.  Continue with rehabilitation services  Recommend Mediterranean-style diet  Return to the emergency room for any new stroke symptoms.  Reviewed be-fast.  Increase activities as tolerated.  Recommend at least 20 minutes of moderate intensity exercise 3 times a week  Recommend no driving  Obtain records from [facility]  Follow-up with PCP as scheduled  Follow-up with neurosurgery as scheduled  Follow-up in our clinic in 6 months or sooner if needed    The patient and I have discussed the plan of care and he is in full agreement at this time.   I spent a total of 60 minutes for this encounter.  This includes reviewing prior records from hospitalization, prior records of current providers, obtaining a thorough HPI, assessment of patient, developing a plan of care with the patient his spouse, patient and spouse discussion, patient spouse education as well as documentation.  Follow Up   Return in about 6 months (around 1/31/2025) for Stroke.            DOROTA Nguyen  07/31/24  11:25 CDT

## 2024-08-06 ENCOUNTER — OFFICE VISIT (OUTPATIENT)
Dept: INTERNAL MEDICINE | Facility: CLINIC | Age: 69
End: 2024-08-06
Payer: MEDICARE

## 2024-08-06 ENCOUNTER — TREATMENT (OUTPATIENT)
Dept: PHYSICAL THERAPY | Facility: CLINIC | Age: 69
End: 2024-08-06
Payer: MEDICARE

## 2024-08-06 VITALS
SYSTOLIC BLOOD PRESSURE: 143 MMHG | BODY MASS INDEX: 35.27 KG/M2 | HEART RATE: 53 BPM | HEIGHT: 69 IN | OXYGEN SATURATION: 98 % | DIASTOLIC BLOOD PRESSURE: 84 MMHG | WEIGHT: 238.1 LBS | RESPIRATION RATE: 16 BRPM

## 2024-08-06 DIAGNOSIS — Z86.73 HISTORY OF STROKE: ICD-10-CM

## 2024-08-06 DIAGNOSIS — I69.351 HEMIPLGA FOLLOWING CEREBRAL INFRC AFF RIGHT DOMINANT SIDE: ICD-10-CM

## 2024-08-06 DIAGNOSIS — I63.9 CEREBROVASCULAR ACCIDENT (CVA), UNSPECIFIED MECHANISM: Primary | ICD-10-CM

## 2024-08-06 DIAGNOSIS — E78.2 MIXED HYPERLIPIDEMIA: ICD-10-CM

## 2024-08-06 DIAGNOSIS — R53.1 RIGHT SIDED WEAKNESS: ICD-10-CM

## 2024-08-06 DIAGNOSIS — E66.01 CLASS 2 SEVERE OBESITY DUE TO EXCESS CALORIES WITH SERIOUS COMORBIDITY AND BODY MASS INDEX (BMI) OF 35.0 TO 35.9 IN ADULT: ICD-10-CM

## 2024-08-06 DIAGNOSIS — I10 PRIMARY HYPERTENSION: Primary | ICD-10-CM

## 2024-08-06 DIAGNOSIS — R47.01 EXPRESSIVE APHASIA: ICD-10-CM

## 2024-08-06 DIAGNOSIS — Z86.010 PERSONAL HISTORY OF COLONIC POLYPS: ICD-10-CM

## 2024-08-06 PROBLEM — R13.12 DYSPHAGIA, OROPHARYNGEAL: Status: ACTIVE | Noted: 2024-04-10

## 2024-08-06 PROBLEM — Z78.9 NONSMOKER: Status: RESOLVED | Noted: 2024-06-17 | Resolved: 2024-08-06

## 2024-08-06 PROBLEM — R13.12 DYSPHAGIA, OROPHARYNGEAL: Status: RESOLVED | Noted: 2024-04-10 | Resolved: 2024-08-06

## 2024-08-06 PROBLEM — R73.02 IMPAIRED GLUCOSE TOLERANCE: Status: ACTIVE | Noted: 2019-07-02

## 2024-08-06 PROBLEM — E11.9 DIABETES MELLITUS: Chronic | Status: ACTIVE | Noted: 2019-07-02

## 2024-08-06 PROCEDURE — 1160F RVW MEDS BY RX/DR IN RCRD: CPT | Performed by: INTERNAL MEDICINE

## 2024-08-06 PROCEDURE — 99214 OFFICE O/P EST MOD 30 MIN: CPT | Performed by: INTERNAL MEDICINE

## 2024-08-06 PROCEDURE — 97110 THERAPEUTIC EXERCISES: CPT

## 2024-08-06 PROCEDURE — 1170F FXNL STATUS ASSESSED: CPT | Performed by: INTERNAL MEDICINE

## 2024-08-06 PROCEDURE — 3079F DIAST BP 80-89 MM HG: CPT | Performed by: INTERNAL MEDICINE

## 2024-08-06 PROCEDURE — G0438 PPPS, INITIAL VISIT: HCPCS | Performed by: INTERNAL MEDICINE

## 2024-08-06 PROCEDURE — 1159F MED LIST DOCD IN RCRD: CPT | Performed by: INTERNAL MEDICINE

## 2024-08-06 PROCEDURE — 97530 THERAPEUTIC ACTIVITIES: CPT

## 2024-08-06 PROCEDURE — 3077F SYST BP >= 140 MM HG: CPT | Performed by: INTERNAL MEDICINE

## 2024-08-06 RX ORDER — ATORVASTATIN CALCIUM 80 MG/1
80 TABLET, FILM COATED ORAL NIGHTLY
Qty: 90 TABLET | Refills: 3 | Status: SHIPPED | OUTPATIENT
Start: 2024-08-06

## 2024-08-06 RX ORDER — CLOPIDOGREL BISULFATE 75 MG/1
75 TABLET ORAL DAILY
Qty: 90 TABLET | Refills: 1 | Status: SHIPPED | OUTPATIENT
Start: 2024-08-06 | End: 2025-02-02

## 2024-08-06 NOTE — PROGRESS NOTES
Occupational Therapy Treatment Note  [address] [place] [address]    Patient: Justin Londono                                                 Visit Date: 2024  :     1955    Referring practitioner:    DOROTA Mckee  Date of Initial Visit:          Type: THERAPY  Noted: 2024    Patient seen for 3 sessions    Visit Diagnoses:    ICD-10-CM ICD-9-CM   1. Cerebrovascular accident (CVA), unspecified mechanism  I63.9 434.91   2. Right sided weakness  R53.1 728.87     SUBJECTIVE     Subjective:  Pt reports his son was able to come for a visit this weekend. He reports his HEP has been going   Well and he has been compliant. He reports they are hard but he can tell his hands are getting   Stronger.     PAIN: 0/10       OBJECTIVE     Objective     Therapy Education/Self Care [id number]   Education offered today Discussed FM activities the pt could perform at home. Provided FM activities handout.    Medbride Code    Ongoing HEP   Thera-putty exercises (Green Thera-Putty)   FM activities for B hands    Timed Minutes      Therapeutic Activities    [id number] Comments   Pt completed FM activities using L hand with min difficulty and mod difficulty with R hand.   - Toothpick pickup with standard tweezers, mod dropping with R hand, min dropping with L hand.   - Bead retrieval from Pink thera-putty focusing on two-point pinch strength and FMC. Pt demo'd mod difficulty with pinch strength and accuracy in B hands.     Pt completed B hand clip strengthening using min-max resistance clips while also focusing on B hand 3-point pinch strength and accuracy. Pt was able to remove all clips from horizontal bar and place them on vertical pole using R hand with mod difficulty. Pt was then able to remove all clips and place them back on horizontal pole using L hand with min difficulty.   Pt reports tinging in R UE.    Pt will complete simulated light to mod  IADL tasks with min cues for body mechanics. 12.5# box lifts waist<>overhead shelf & waist<>floor with CGA and cues for body mechanics.  Pt reported pain in R LE and tingling in R UE.    Timed Minutes 30     Therapeutic Exercises    62832 Units Comments   Pt completed B hand power grasp using 9# digi-flex completing 1 set of 20 reps each. Pt then completed B hand isolated finger flexion using 7# digi-flex performing 1 set of 10 reps each digit.      Pt completed B hand/wrist strengthening in all planes using light resistance flex bar to simulate opening various jars and containers performing 1 set of 20 reps.          Timed Minutes 15     Total Timed Treatment:     45   mins  Total Time of Visit:             45   mins         ASSESSMENT/PLAN     GOALS:        Goals                                          Progress Note due by 8/21/24                                                      Recert due by 10/19/24   STG by: 8/21/24 Comments Date Status   Pt will be independent with daily completion of a HEP to address stroke deficits affecting IADL performance.   FM activities added to HEP        Pt will be independent with simulated light-moderate IADL tasks demonstrating good safety techniques.  12.5# box lifts waist<>overhead shelf & waist<>floor with CGA and cues for body mechanics.        Pt will display improved B UE FMC by completing the 9 hole peg test in 20 seconds or less with R hand and 22 seconds or less with L hand.  Displayed mod difficulty with FM activities in B hands.                  LTG by: 8/21/24         Pt will improve B hand  strength to 60 lbs with no report of pain.  9# digi flex power grasp exercises        Pt will display appropriate motor coordination, visual scanning and reaction time for IADL performance by passing all 4 assessments on the [employer] BITS.         Pt will improve B three point pinch strength to 21 lbs with no report of pain.  Resistance clips 3-point pinch activity with min  difficulty with L hand and mod difficulty with R hand.                                         Assessment/Plan     ASSESSMENT:   Pt continues to demo improvements in B  and pinch strength, and has been reporting   Less fatigue in B hands with activity. Pt reports improvement in IADL performance at home   And has done well with simulations in clinic. Pt continues to struggle with FMC in B hands,   And it continues to affect his daily function.     PLAN:   Will address FM deficits, impairments in IADL performance, and pinch strength.     SIGNATURE: Cristela Bey OT, [place] License #: [id number]  Electronically Signed on 8/6/2024        [facility] [place] [address]  [phone]

## 2024-08-06 NOTE — PATIENT INSTRUCTIONS
You should call [phone] to schedule your ultrasound evaluations.  You should call [phone] to schedule your colonoscopy.      Advance Care Planning and Advance Directives     You make decisions on a daily basis - decisions about where you want to live, your career, your home, your life. Perhaps one of the most important decisions you face is your choice for future medical care. Take time to talk with your family and your healthcare team and start planning today.  Advance Care Planning is a process that can help you:  Understand possible future healthcare decisions in light of your own experiences  Reflect on those decision in light of your goals and values  Discuss your decisions with those closest to you and the healthcare professionals that care for you  Make a plan by creating a document that reflects your wishes    Surrogate Decision Maker  In the event of a medical emergency, which has left you unable to communicate or to make your own decisions, you would need someone to make decisions for you.  It is important to discuss your preferences for medical treatment with this person while you are in good health.     Qualities of a surrogate decision maker:  Willing to take on this role and responsibility  Knows what you want for future medical care  Willing to follow your wishes even if they don't agree with them  Able to make difficult medical decisions under stressful circumstances    Advance Directives  These are legal documents you can create that will guide your healthcare team and decision maker(s) when needed. These documents can be stored in the electronic medical record.    Living Will - a legal document to guide your care if you have a terminal condition or a serious illness and are unable to communicate. States vary by statute in document names/types, but most forms may include one or more of the following:        -  Directions regarding life-prolonging treatments        -  Directions regarding  artificially provided nutrition/hydration        -  Choosing a healthcare decision maker        -  Direction regarding organ/tissue donation    Durable Power of  for Healthcare - this document names an -in-fact to make medical decisions for you, but it may also allow this person to make personal and financial decisions for you. Please seek the advice of an  if you need this type of document.    **Advance Directives are not required and no one may discriminate against you if you do not sign one.    Medical Orders  Many states allow specific forms/orders signed by your physician to record your wishes for medical treatment in your current state of health. This form, signed in personal communication with your physician, addresses resuscitation and other medical interventions that you may or may not want.      For more information or to schedule a time with a [facility] Advance Care Planning Facilitator contact: [facility] or call [phone] and someone will contact you directly.  Medicare Wellness  Personal Prevention Plan of Service     Date of Office Visit:    Encounter Provider:  Shahid Dinero DO  Place of Service:  [facility]  Patient Name: Justin Londono  :  1955    As part of the Medicare Wellness portion of your visit today, we are providing you with this personalized preventive plan of services (PPPS). This plan is based upon recommendations of the [place] Preventive Services Task Force (USPSTF) and the Advisory Committee on Immunization Practices (ACIP).    This lists the preventive care services that should be considered, and provides dates of when you are due. Items listed as completed are up-to-date and do not require any further intervention.    Health Maintenance   Topic Date Due    TDAP/TD VACCINES (1 - Tdap) Never done    ZOSTER VACCINE (1 of 2) Never done    COLORECTAL CANCER SCREENING  2020     ANNUAL WELLNESS VISIT  Never done    Pneumococcal Vaccine 65+ (2 of 2 - PCV) 11/29/2021    COVID-19 Vaccine (1 - 2023-24 season) Never done    INFLUENZA VACCINE  08/01/2024    LIPID PANEL  07/01/2025    BMI FOLLOWUP  07/01/2025    HEPATITIS C SCREENING  Completed    AAA SCREEN (ONE-TIME)  Completed       Orders Placed This Encounter   Procedures    Ambulatory Referral to Gastroenterology     Referral Priority:   Routine     Referral Type:   Consultation     Referral Reason:   Specialty Services Required     Requested Specialty:   Gastroenterology     Number of Visits Requested:   1       No follow-ups on file.

## 2024-08-06 NOTE — PROGRESS NOTES
Subjective   The ABCs of the Annual Wellness Visit  Medicare Wellness Visit      Justin Londono is a 69 y.o. patient who presents for a Medicare Wellness Visit.    The following portions of the patient's history were reviewed and   updated as appropriate: allergies, current medications, past family history, past medical history, past social history, past surgical history, and problem list.    Compared to one year ago, the patient's physical   health is better.  Compared to one year ago, the patient's mental   health is better.    Recent Hospitalizations:  He was admitted within the past 365 days at [facility] in [place], [place].     Current Medical Providers:  Patient Care Team:  Sharmin Bright APRN as PCP - General (Nurse Practitioner)  Maite Dodd PA-C as Physician Assistant (Physician Assistant)    Outpatient Medications Prior to Visit   Medication Sig Dispense Refill    aspirin 81 MG EC tablet Take 1 tablet by mouth Daily.      atorvastatin (LIPITOR) 40 MG tablet Take 1 tablet by mouth Every Night for 180 days. 90 tablet 1    clopidogrel (PLAVIX) 75 MG tablet Take 1 tablet by mouth Daily for 180 days. 90 tablet 1     No facility-administered medications prior to visit.     No opioid medication identified on active medication list. I have reviewed chart for other potential  high risk medication/s and harmful drug interactions in the elderly.      Aspirin is on active medication list. Aspirin use is indicated based on review of current medical condition/s. Pros and cons of this therapy have been discussed today. Benefits of this medication outweigh potential harm.  Patient has been encouraged to continue taking this medication.  .      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  ACP discussion was held with the patient during this visit. Patient does not have an advance directive, information provided.            Objective   Vitals:    24 1019   BP: 143/84   BP Location: Left arm   Patient Position: Sitting   Cuff Size: Adult   Pulse: 53   Resp: 16   SpO2: 98%   Weight: 108 kg (238 lb 1.6 oz)   Height: 175.3 cm (69\")       Estimated body mass index is 35.16 kg/m² as calculated from the following:    Height as of this encounter: 175.3 cm (69\").    Weight as of this encounter: 108 kg (238 lb 1.6 oz).            Does the patient have evidence of cognitive impairment? No  Lab Results   Component Value Date    TRIG 98 2024    HDL 42 2024    LDL 85 2024    VLDL 18 2024                                                                                                Health  Risk Assessment    Smoking Status:  Social History     Tobacco Use   Smoking Status Former    Current packs/day: 0.00    Average packs/day: 0.3 packs/day for 10.0 years (2.5 ttl pk-yrs)    Types: Cigarettes    Start date:     Quit date:     Years since quittin.6    Passive exposure: Past   Smokeless Tobacco Never     Alcohol Consumption:  Social History     Substance and Sexual Activity   Alcohol Use Yes    Comment: 10 drinks weekly       Fall Risk Screen  STEADI Fall Risk Assessment was completed, and patient is at LOW risk for falls.Assessment completed on:2024    Depression Screenin/6/2024    10:21 AM   PHQ-2/PHQ-9 Depression Screening   Little Interest or Pleasure in Doing Things 0-->not at all   Feeling Down, Depressed or Hopeless 0-->not at all   PHQ-9: Brief Depression Severity Measure Score 0     Health Habits and Functional and " Cognitive Screenin/6/2024    10:00 AM   Functional & Cognitive Status   Do you have difficulty preparing food and eating? No   Do you have difficulty bathing yourself, getting dressed or grooming yourself? No   Do you have difficulty using the toilet? No   Do you have difficulty moving around from place to place? No   Do you have trouble with steps or getting out of a bed or a chair? No   Current Diet Unhealthy Diet   Dental Exam Up to date   Eye Exam Up to date   Exercise (times per week) 7 times per week   Current Exercises Include Walking   Do you need help using the phone?  No   Are you deaf or do you have serious difficulty hearing?  No   Do you need help to go to places out of walking distance? No   Do you need help shopping? No   Do you need help preparing meals?  No   Do you need help with housework?  No   Do you need help with laundry? No   Do you need help taking your medications? No   Do you need help managing money? No   Do you ever drive or ride in a car without wearing a seat belt? No   Have you felt unusual stress, anger or loneliness in the last month? No   Who do you live with? Spouse   If you need help, do you have trouble finding someone available to you? No   Have you been bothered in the last four weeks by sexual problems? No   Do you have difficulty concentrating, remembering or making decisions? No           Age-appropriate Screening Schedule:  Refer to the list below for future screening recommendations based on patient's age, sex and/or medical conditions. Orders for these recommended tests are listed in the plan section. The patient has been provided with a written plan.    Health Maintenance List  Health Maintenance   Topic Date Due    TDAP/TD VACCINES (1 - Tdap) Never done    ZOSTER VACCINE (1 of 2) Never done    COLORECTAL CANCER SCREENING  2020    ANNUAL WELLNESS VISIT  Never done    Pneumococcal Vaccine 65+ (2 of 2 - PCV) 2021    COVID-19 Vaccine (1 -   season) Never done    INFLUENZA VACCINE  08/01/2024    LIPID PANEL  07/01/2025    BMI FOLLOWUP  07/01/2025    HEPATITIS C SCREENING  Completed    AAA SCREEN (ONE-TIME)  Completed                                                                                                                                                CMS Preventative Services Quick Reference  Risk Factors Identified During Encounter  Fall Risk-High or Moderate: Discussed Fall Prevention in the home  Immunizations Discussed/Encouraged: Tdap, Influenza, Prevnar 20 (Pneumococcal 20-valent conjugate), Shingrix, COVID19, and RSV (Respiratory Syncytial Virus)  Dental Screening Recommended  Vision Screening Recommended    The above risks/problems have been discussed with the patient.  Pertinent information has been shared with the patient in the After Visit Summary.  An After Visit Summary and PPPS were made available to the patient.    Follow Up:   Next Medicare Wellness visit to be scheduled in 1 year.         Additional E&M Note during same encounter follows:  Patient has additional, significant, and separately identifiable condition(s)/problem(s) that require work above and beyond the Medicare Wellness Visit     Chief Complaint  No chief complaint on file.    Tino Anderson is also being seen today for additional medical problem/s.       The patient presents for evaluation of multiple medical concerns.    The patient continues to engage in therapy to address his speech difficulties, which he reports as improving after his stroke. His swallowing function is satisfactory, and he does not require any thickeners. He continues to experience mild right-sided weakness, although he denies any recent falls or stumbling. His current medication regimen includes aspirin and Plavix. He was hospitalized for a stroke on 02/01/2024 at [facility] in [place], [place]. He underwent shoulder surgery, after which he suffered the stroke. . His shoulder  "condition has improved since .     Review of Systems   Respiratory:  Negative for shortness of breath.    Cardiovascular:  Negative for chest pain.          Objective   Vital Signs:  /84 (BP Location: Left arm, Patient Position: Sitting, Cuff Size: Adult)   Pulse 53   Resp 16   Ht 175.3 cm (69\")   Wt 108 kg (238 lb 1.6 oz)   SpO2 98%   BMI 35.16 kg/m²   Physical Exam  Constitutional:       General: He is not in acute distress.  Cardiovascular:      Rate and Rhythm: Normal rate and regular rhythm.      Heart sounds: Normal heart sounds. No murmur heard.  Pulmonary:      Effort: Pulmonary effort is normal.      Breath sounds: Normal breath sounds. No wheezing.   Neurological:      Mental Status: He is alert and oriented to person, place, and time.      Gait: Gait normal.   Psychiatric:         Mood and Affect: Mood normal.         Behavior: Behavior normal.                        Assessment and Plan      Diagnoses and all orders for this visit:    1. Primary hypertension (Primary)  Well controlled, BP goal for age is <140/90 per JNC 8 guidelines, and continue current medications    2. History of stroke  5. Expressive aphasia  6. 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  Dispense: 90 tablet; Refill: 1  Continue ASA & plavix. ALso, continue statin as below.     3. Mixed hyperlipidemia  -     atorvastatin (LIPITOR) 80 MG tablet; Take 1 tablet by mouth Every Night.  Dispense: 90 tablet; Refill: 3  Goal LDL <70, so we will increase his statin to high intensity dose to drive this down lower.     4. Class 2 severe obesity due to excess calories with serious comorbidity and body mass index (BMI) of 35.0 to 35.9 in adult   Continue lifestyle modification.     7. Personal history of colonic polyps  -     Ambulatory Referral to Gastroenterology        Orders Placed This Encounter   Procedures    Ambulatory Referral " to Gastroenterology     Referral Priority:   Routine     Referral Type:   Consultation     Referral Reason:   Specialty Services Required     Requested Specialty:   Gastroenterology     Number of Visits Requested:   1     New Medications Ordered This Visit   Medications    clopidogrel (PLAVIX) 75 MG tablet     Sig: Take 1 tablet by mouth Daily for 180 days.     Dispense:  90 tablet     Refill:  1    atorvastatin (LIPITOR) 80 MG tablet     Sig: Take 1 tablet by mouth Every Night.     Dispense:  90 tablet     Refill:  3          Follow Up   No follow-ups on file.  Patient was given instructions and counseling regarding his condition or for health maintenance advice. Please see specific information pulled into the AVS if appropriate.  Patient or patient representative verbalized consent for the use of Ambient Listening during the visit with  Shahid Dinero DO for chart documentation. 8/7/2024  22:34 CDT

## 2024-08-12 ENCOUNTER — TELEPHONE (OUTPATIENT)
Dept: PHYSICAL THERAPY | Facility: OTHER | Age: 69
End: 2024-08-12
Payer: MEDICARE

## 2024-08-12 NOTE — TELEPHONE ENCOUNTER
Caller: Justin Londono    Relationship: Self    What was the call regarding: PATIENT CANCELLED APPT TODAY DUE TO NOT FEELING WELL

## 2024-08-14 ENCOUNTER — TREATMENT (OUTPATIENT)
Dept: PHYSICAL THERAPY | Facility: CLINIC | Age: 69
End: 2024-08-14
Payer: MEDICARE

## 2024-08-14 DIAGNOSIS — R47.01 APHASIA: Primary | ICD-10-CM

## 2024-08-14 DIAGNOSIS — R48.2 APRAXIA: ICD-10-CM

## 2024-08-14 PROCEDURE — 92507 TX SP LANG VOICE COMM INDIV: CPT | Performed by: SPEECH-LANGUAGE PATHOLOGIST

## 2024-08-14 NOTE — PROGRESS NOTES
Speech Language Pathology Treatment Note and 30 Day Progress Note  [address] [place] [address]    Patient: Justin Londono                                                                                     Visit Date: 2024  :     1955    Referring practitioner:    Maite Dodd PA-C  Date of Initial Visit:          Type: THERAPY  Noted: 2024    Patient seen for 14 sessions    Visit Diagnoses:    ICD-10-CM ICD-9-CM   1. Aphasia  R47.01 784.3   2. Apraxia  R48.2 784.69       SUBJECTIVE     Patient arrived alert and ready for therapy today.     OBJECTIVE   GOALS    Goals                                         STG Comments Status   Patient will improve verbal expressive language skills by completing automatic speech tasks, naming objects/pictures, and completing open-ended phrases/sentences Patient continues to be able to properly initiate, respond to and express greetings. Ongoing   Patient will improve comprehension of spoken language by following 1 step then two step directions presented verbally and/or in writing Patient had some difficulty pointing to two objects on a field of 10. Improved with field of 3 but needed cues to attend to the pictures. He was able to identify spoken words with increased attention.  Ongoing   Patient will improve comprehension and expression of written language skills by complete functional reading and writing tasks Patient was able to match words to the corresponding object at 100% accuracy. He was able to choose sentences that described a picture at 90% accuracy.  Ongoing   Patient will demonstrate understanding of and use AAC device to augment communication and express wants and needs AAC device was not used today. He was able to access the screen on therapy carmen without difficulty.     Previous: Patient used AAC keyboard to type the name of words or pictures.  Ongoing   LTG by:         Patient will be able to verbally and or in writing express basic wants and needs in home environment Patient continues to show progress with verbal and written language. In the process of getting an AAC device. Ongoing   Patient will comprehend basic spoken and written information presented in home environment Patient continues to require introduction to tasks before comprehending. He presents with auditory comprehension deficits needing slower rate of speech and repetitions to comprehend others. He often needs reminders of the task or object of focus. Patient is able to understand written language more than verbal language. Ongoing           Total Time of Visit:             45   mins         ASSESSMENT/PLAN     ASSESSMENT: Patient was able to follow one and two step directions. He continues to show progress with these tasks. He is able to match words to pictures.     PLAN: Continue therapy.     Progress Note Due Date: 9/17/2024  Recertification Due Date: 9/17/2024        Signature:  Zabrina Bhat CCC-SLP, [place] License #: [id number]  Electronically signed on 8/14/2024          [facility] [place] [address]  [phone]

## 2024-08-15 ENCOUNTER — HOSPITAL ENCOUNTER (OUTPATIENT)
Dept: CT IMAGING | Facility: HOSPITAL | Age: 69
Discharge: HOME | End: 2024-08-15
Admitting: PHYSICIAN ASSISTANT
Payer: MEDICARE

## 2024-08-15 DIAGNOSIS — M50.30 DDD (DEGENERATIVE DISC DISEASE), CERVICAL: ICD-10-CM

## 2024-08-15 PROCEDURE — 70496 CT ANGIOGRAPHY HEAD: CPT

## 2024-08-15 PROCEDURE — 0042T HC CT CEREBRAL PERFUSION W/WO CONTRAST: CPT

## 2024-08-15 PROCEDURE — 72125 CT NECK SPINE W/O DYE: CPT

## 2024-08-15 PROCEDURE — 25510000001 IOPAMIDOL PER 1 ML: Performed by: NEUROLOGICAL SURGERY

## 2024-08-15 PROCEDURE — 70498 CT ANGIOGRAPHY NECK: CPT

## 2024-08-15 RX ADMIN — IOPAMIDOL 125 ML: 755 INJECTION, SOLUTION INTRAVENOUS at 12:51

## 2024-08-19 ENCOUNTER — TREATMENT (OUTPATIENT)
Dept: PHYSICAL THERAPY | Facility: CLINIC | Age: 69
End: 2024-08-19
Payer: MEDICARE

## 2024-08-19 DIAGNOSIS — I63.9 CEREBROVASCULAR ACCIDENT (CVA), UNSPECIFIED MECHANISM: Primary | ICD-10-CM

## 2024-08-19 DIAGNOSIS — R53.1 RIGHT SIDED WEAKNESS: ICD-10-CM

## 2024-08-19 DIAGNOSIS — R48.2 APRAXIA: ICD-10-CM

## 2024-08-19 DIAGNOSIS — R47.01 APHASIA: Primary | ICD-10-CM

## 2024-08-19 PROCEDURE — 92507 TX SP LANG VOICE COMM INDIV: CPT | Performed by: SPEECH-LANGUAGE PATHOLOGIST

## 2024-08-19 PROCEDURE — 97110 THERAPEUTIC EXERCISES: CPT

## 2024-08-19 NOTE — PROGRESS NOTES
Speech Language Pathology Treatment Note and 30 Day Progress Note  [address] [place] [address]    Patient: Justin Londono                                                                                     Visit Date: 2024  :     1955    Referring practitioner:    Maite Dodd PA-C  Date of Initial Visit:          Type: THERAPY  Noted: 2024    Patient seen for 15 sessions    Visit Diagnoses:    ICD-10-CM ICD-9-CM   1. Aphasia  R47.01 784.3   2. Apraxia  R48.2 784.69       SUBJECTIVE     Patient arrived alert and ready for therapy today.     OBJECTIVE   GOALS    Goals                                         STG Comments Status   Patient will improve verbal expressive language skills by completing automatic speech tasks, naming objects/pictures, and completing open-ended phrases/sentences Patient continues to be able to properly initiate, respond to and express greetings. Ongoing   Patient will improve comprehension of spoken language by following 1 step then two step directions presented verbally and/or in writing Patient had some difficulty pointing to two objects on a field of 10. Improved with field of 3 but needed cues to attend to the pictures. He was able to identify spoken words with increased attention.  Ongoing   Patient will improve comprehension and expression of written language skills by complete functional reading and writing tasks Patient was able to match words to the corresponding object at 100% accuracy. He was able to choose sentences that described a picture at 90% accuracy.  Ongoing   Patient will demonstrate understanding of and use AAC device to augment communication and express wants and needs Patient used AAC keyboard to type the name of words or pictures with assistance.   Ongoing   LTG by:        Patient will be able to verbally and or in writing express basic wants and needs in home environment  Patient continues to show progress with verbal and written language. In the process of getting an AAC device. Ongoing   Patient will comprehend basic spoken and written information presented in home environment Patient continues to require introduction to tasks before comprehending. He presents with auditory comprehension deficits needing slower rate of speech and repetitions to comprehend others. He often needs reminders of the task or object of focus. Patient is able to understand written language more than verbal language. Ongoing           Total Time of Visit:             45   mins         ASSESSMENT/PLAN     ASSESSMENT: Patient was able to follow one and two step directions. He continues to show progress with these tasks. He is able to match words and short sentences to pictures.     PLAN: Continue therapy.     Progress Note Due Date: 9/17/2024  Recertification Due Date: 9/17/2024        Signature:  Zabrina Bhat CCC-SLP, [place] License #: [id number]  Electronically signed on 8/19/2024          [facility] [place] [address]  [phone]

## 2024-08-19 NOTE — PROGRESS NOTES
Occupational Therapy 30 Day Progress Note  [address] [place] [address]    Patient: Justin Londono                                                 Visit Date: 2024  :     1955    Referring practitioner:    DOROTA Mckee  Date of Initial Visit:          Type: THERAPY  Noted: 2024    Patient seen for 4 sessions    Visit Diagnoses:    ICD-10-CM ICD-9-CM   1. Cerebrovascular accident (CVA), unspecified mechanism  I63.9 434.91   2. Right sided weakness  R53.1 728.87       SUBJECTIVE     Subjective:  Pt reports he had to work hard in speech therapy today. Pt reports he has had more issues with    Sensation changes in his R arm the past two weeks.     PAIN: 2/10    Outcome Measure:   9 Hole Peg Test: Left: 23.76s  Right: 30.74s  PT G-Codes  Outcome Measure Options: Quick DASH  Quick DASH Score: 50    OBJECTIVE     Objective          Active Range of Motion   Left Shoulder   Normal active range of motion    Right Shoulder   Flexion: 100 degrees with pain  Extension: WFL  Abduction: WFL  Adduction: 106 degrees with pain    Strength/Myotome Testing     Left Shoulder   Normal muscle strength    Right Shoulder     Planes of Motion   Flexion: 4   Extension: 4   Abduction: 4-   Adduction: 4-     Left Elbow   Normal strength    Right Elbow   Flexion: 4+  Extension: 4+    Left Wrist/Hand   Normal wrist strength     (2nd hand position)     Trial 1: 45 lbs    Trial 2: 44 lbs    Trial 3: 46 lbs    Average: 45 lbs    Thumb Strength  Key/Lateral Pinch     Trial 1: 23 lbs    Trial 2: 22 lbs    Trial 3: 23 lbs    Average: 22.67 lbs  Tip/Two-Point Pinch     Trial 1: 16 lbs    Trial 2: 15.5 lbs    Trial 3: 14.5 lbs    Average: 15.33 lbs  Palmar/Three-Point Pinch     Trial 1: 16.5 lbs    Trial 2: 17 lbs    Trial 3: 17 lbs    Average: 16.83 lbs    Right Wrist/Hand   Normal wrist strength     (2nd hand position)     Trial 1: 44 lbs    Trial  2: 48 lbs    Trial 3: 50 lbs    Average: 47.33 lbs    Thumb Strength   Key/Lateral Pinch     Trial 1: 18 lbs    Trial 2: 21 lbs    Trial 3: 17 lbs    Average: 18.67 lbs  Tip/Two-Point Pinch     Trial 1: 11 lbs    Trial 2: 14 lbs    Trial 3: 15 lbs    Average: 13.33 lbs  Palmar/Three-Point Pinch     Trial 1: 16 lbs    Trial 2: 16.5 lbs    Trial 3: 17 lbs    Average: 16.5 lbs        Therapy Education/Self Care [id number]   Education offered today Discussed safety concerns d/t changes with sensation in R UE worsening over the past two weeks.    Medbride Code    Ongoing HEP   Thera-putty exercises (Green Thera-Putty)   FM activities for B hands    Timed Minutes      Therapeutic Exercises    32352 Units Comments   Completed measurements for progress note. See changes above.      Pt completed B hand/wrist strengthening in all planes using moderate resistance flex bar to simulate opening various jars and containers performing 1 set of 20 reps.     Pt completed B hand power grasp using 9# digi-flex completing 1 set of 20 reps each. Pt then completed B hand three-point pinch strengthening using 7# digi-flex performing 1 set of 20 reps each digit.           Timed Minutes 45     Total Timed Treatment:     45   mins  Total Time of Visit:             45   mins         ASSESSMENT/PLAN     GOALS:        Goals                                          Progress Note due by 9/18/24                                                      Recert due by 10/19/24   STG by: 9/18/24 Comments Date Status   Pt will be independent with daily completion of a HEP to address stroke deficits affecting IADL performance.      Ongoing    Pt will be independent with simulated light-moderate IADL tasks demonstrating good safety techniques.  Pt reports some improvement in participation at home, but reports he is very limited by his B LE weakness.     Ongoing    Pt will display improved B UE FMC by completing the 9 hole peg test in 20 seconds or less with R hand  and 22 seconds or less with L hand.   Left: 23.76s  Right: 30.74s   Left hand improved.     Ongoing             LTG by: 10/19/24        Pt will improve B hand  strength to 60 lbs with no report of pain.  Completed digi-flex B hand power grasp using 9# digi-flex completing 1 set of 20 reps each.     strength measurement improved in B hands.       Ongoing    Pt will display appropriate motor coordination, visual scanning and reaction time for IADL performance by passing all 4 assessments on the [employer].  Completed on 7/29/24:   Trail Making Part A completed with the R UE. 1 errors, 2 interruptions and a time of 0:46. Part B completed with 4 errors, 15 interruptions and a time of 1:54, with mod cues for attention.  Bell Cancellation Test completed with the R UE in a time of 1:47 with 2 errors and 2 distractor hits.   Computerized Maze Assessment completed with the R UE in a time of 0:31 with 3 errors.   Visual Scanning and Motor Reaction Assessment completed with the R UE. Level 1 41/50 required targets.  Pt was able to pass 0/4 assessment levels.    Ongoing    Pt will improve B three point pinch strength to 21 lbs with no report of pain.  B hand three-point pinch strengthening using 7# digi-flex performing 1 set of 20 reps each digit.     Three-point pinch strength improved in R hand and remained the same in L hand.     Ongoing                                     Assessment/Plan     ASSESSMENT:   Pt continues to demonstrate improvements in R hand strength and functional use. Pt reports he   Can see improvements in his IADL performance at home, but has concerns for his B LE   Strength. Pt remains committed to seeing improvements through therapy and demonstrates   Adherence to HEP daily. Pt would continue to benefit from skilled OT services 1x per week.     PLAN:   Will address FM deficits, impairments in IADL performance, and pinch strength.     SIGNATURE: Cristela Bey, OT, [place] License #:  [id number]  Electronically Signed on 8/19/2024        [address] [place] [address]  [phone]

## 2024-08-21 ENCOUNTER — TREATMENT (OUTPATIENT)
Dept: PHYSICAL THERAPY | Facility: CLINIC | Age: 69
End: 2024-08-21
Payer: MEDICARE

## 2024-08-21 DIAGNOSIS — R47.01 APHASIA: Primary | ICD-10-CM

## 2024-08-21 DIAGNOSIS — R48.2 APRAXIA: ICD-10-CM

## 2024-08-21 PROCEDURE — 92507 TX SP LANG VOICE COMM INDIV: CPT | Performed by: SPEECH-LANGUAGE PATHOLOGIST

## 2024-08-21 NOTE — PROGRESS NOTES
Speech Language Pathology Treatment Note  [address] [place] [address]    Patient: Justin Londono                                                                                     Visit Date: 2024  :     1955    Referring practitioner:    Maite Dodd PA-C  Date of Initial Visit:          Type: THERAPY  Noted: 2024    Patient seen for 16 sessions    Visit Diagnoses:    ICD-10-CM ICD-9-CM   1. Aphasia  R47.01 784.3   2. Apraxia  R48.2 784.69       SUBJECTIVE     Patient arrived alert and ready for therapy today.     OBJECTIVE   GOALS    Goals                                         STG Comments Status   Patient will improve verbal expressive language skills by completing automatic speech tasks, naming objects/pictures, and completing open-ended phrases/sentences Patient continues to be able to properly initiate, respond to and express greetings.  Spontaneous automatic speech is improving. Patient was able to name 21 pictures with written word given carrier phrase. This task was too frustrating for him to complete previously. He was able to repeat 20 more with mod/max cues and was able to include some relevant phonemes and was unable to attempt with one picture.  Ongoing   Patient will improve comprehension of spoken language by following 1 step then two step directions presented verbally and/or in writing Previous: patient had some difficulty pointing to two objects on a field of 10. Improved with field of 3 but needed cues to attend to the pictures. He was able to identify spoken words with increased attention.  Ongoing   Patient will improve comprehension and expression of written language skills by complete functional reading and writing tasks Writing not addressed today.Previous: Patient was able to match words to the corresponding o`bject at 100% accuracy. He was able to choose sentences that described a picture at  90% accuracy.  Ongoing   Patient will demonstrate understanding of and use AAC device to augment communication and express wants and needs Application for AAC in progress. Wife signed papers today.     Previous: Patient used AAC keyboard to type the name of words or pictures.  Ongoing   LTG by:        Patient will be able to verbally and or in writing express basic wants and needs in home environment Patient continues to show progress with verbal and written language. In the process of getting an AAC device. Ongoing   Patient will comprehend basic spoken and written information presented in home environment Patient continues to require introduction to tasks before comprehending. He presents with auditory comprehension deficits needing slower rate of speech and repetitions to comprehend others. He often needs reminders of the task or object of focus. Patient is able to understand written language more than verbal language. Ongoing           Total Time of Visit:             45   mins         ASSESSMENT/PLAN     ASSESSMENT: Patient showed marked improvement with naming pictures with written word given carrier phrase 21/42 (50%) today.     PLAN: Continue therapy.     Progress Note Due Date: 9/17/2024  Recertification Due Date: 9/17/2024        Signature:  Zabrina Bhat CCC-SLP, [place] License #: [id number]  Electronically signed on 8/21/2024          [place]. [id number]  [phone]

## 2024-08-22 ENCOUNTER — TRANSCRIBE ORDERS (OUTPATIENT)
Dept: PHYSICAL THERAPY | Facility: CLINIC | Age: 69
End: 2024-08-22
Payer: MEDICARE

## 2024-08-22 DIAGNOSIS — R47.01 EXPRESSIVE APHASIA: Primary | ICD-10-CM

## 2024-08-23 ENCOUNTER — OFFICE VISIT (OUTPATIENT)
Dept: NEUROSURGERY | Facility: CLINIC | Age: 69
End: 2024-08-23
Payer: MEDICARE

## 2024-08-23 VITALS — HEIGHT: 69 IN | BODY MASS INDEX: 35.25 KG/M2 | WEIGHT: 238 LBS

## 2024-08-23 DIAGNOSIS — M50.30 DDD (DEGENERATIVE DISC DISEASE), CERVICAL: ICD-10-CM

## 2024-08-23 DIAGNOSIS — Z78.9 NON-SMOKER: ICD-10-CM

## 2024-08-23 DIAGNOSIS — M48.02 SPINAL STENOSIS IN CERVICAL REGION: ICD-10-CM

## 2024-08-23 DIAGNOSIS — E66.01 CLASS 2 SEVERE OBESITY DUE TO EXCESS CALORIES WITH SERIOUS COMORBIDITY AND BODY MASS INDEX (BMI) OF 35.0 TO 35.9 IN ADULT: Primary | ICD-10-CM

## 2024-08-23 PROCEDURE — 99213 OFFICE O/P EST LOW 20 MIN: CPT | Performed by: NEUROLOGICAL SURGERY

## 2024-08-23 NOTE — PROGRESS NOTES
"    Chief complaint:   Chief Complaint   Patient presents with    Follow-up     Follow up numbness and tingling right hand discuss CT        Subjective     HPI: I had a chance to see Elias today in follow-up and to review his imaging studies of the cervical spine.  All of his instrumentation appears intact with maybe some mild loosening of one of the screws at C3 however clinically he is doing very well at this time.  He has paresthesias into the right arm are likely due to his CVA and he is still having a significant amount of speech difficulty.  He does feel this is slowly improving over time however    Review of Systems      Objective      Vital Signs  Ht 175.3 cm (69\")   Wt 108 kg (238 lb)   BMI 35.15 kg/m²     Physical Exam  Neurological:      Mental Status: He is oriented to person, place, and time.   Psychiatric:         Speech: Speech normal. Speech is slurred.         Neurologic Exam     Mental Status   Oriented to person, place, and time.   Attention: normal.   Speech: speech is normal and slurred   Knowledge: good.   Expressive aphasia     Cranial Nerves     CN VII   Facial expression full, symmetric.     Motor Exam   Overall muscle tone: normal    Sensory Exam   Right arm light touch: decreased from elbow  Left arm light touch: normal  Right leg light touch: normal  Left leg light touch: normal      Imaging review:     XAM: CT ANGIOGRAM HEAD-, CT ANGIOGRAM NECK- - 8/15/2024 10:54 AM     HISTORY: slurred speech - spoke with Dr. Salvador on phone; M50.30-Other  cervical disc degeneration, unspecified cervical region       COMPARISON: None.     DOSE LENGTH PRODUCT: 2650.15 mGy.cm. Automated exposure control was also  utilized to decrease patient radiation dose.    ...   Impression      1. No arterial occlusion or flow-limiting stenosis in the neck.  2. No intracranial large vessel occlusion or flow-limiting stenosis.  3. Remote left temporoparietal cortical infarct.     This report was signed and finalized " on 8/15/2024 1:41 PM by Dr Alexi Lane.              CT Angiogram Head Final result 8/15/2024          Narrative   EXAM: CT ANGIOGRAM HEAD-, CT ANGIOGRAM NECK- - 8/15/2024 10:54 AM     HISTORY: slurred speech - spoke with Dr. Salvador on phone; M50.30-Other  cervical disc degeneration, unspecified cervical region       COMPARISON: None.     DOSE LENGTH PRODUCT: 2650.15 mGy.cm. Automated exposure control was also  utilized to decrease patient radiation dose.    ...   Impression      1. No arterial occlusion or flow-limiting stenosis in the neck.  2. No intracranial large vessel occlusion or flow-limiting stenosis.  3. Remote left temporoparietal cortical infarct.               Assessment/Plan:   Status post anterior cervical discectomy and fusion from C3-C6 done in February in [place]  Status post CVA following cervical surgery    Patient is doing quite well at this time however is still having some speech issues and tingling into the right arm likely due to the CVA.  His CT scan of the neck looks okay and there does not appear to be any flow-limiting stenosis on his angiograms.  I would like to see him back in 3 months to check on his progress.  I look forward to seeing him at his next visit.    Patient is a nonsmoker  The patient's Body mass index is 35.15 kg/m².. BMI is above normal parameters. Recommendations include: continue with current weight loss program    Diagnoses and all orders for this visit:    1. Class 2 severe obesity due to excess calories with serious comorbidity and body mass index (BMI) of 35.0 to 35.9 in adult (Primary)    2. Non-smoker    3. DDD (degenerative disc disease), cervical  -     XR Spine Cervical Complete With Flex Ext; Future    4. Spinal stenosis in cervical region  -     XR Spine Cervical Complete With Flex Ext; Future        I discussed the patients findings and my recommendations with patient  Raul Salvador DO  08/23/24  15:28 CDT

## 2024-08-26 ENCOUNTER — HOSPITAL ENCOUNTER (OUTPATIENT)
Dept: OCCUPATIONAL THERAPY | Facility: HOSPITAL | Age: 69
Setting detail: THERAPIES SERIES
Discharge: HOME | End: 2024-08-26
Payer: MEDICARE

## 2024-08-26 DIAGNOSIS — I63.9 CEREBROVASCULAR ACCIDENT (CVA), UNSPECIFIED MECHANISM: Primary | ICD-10-CM

## 2024-08-26 DIAGNOSIS — R53.1 RIGHT SIDED WEAKNESS: ICD-10-CM

## 2024-08-26 PROCEDURE — 97530 THERAPEUTIC ACTIVITIES: CPT

## 2024-08-26 PROCEDURE — 97110 THERAPEUTIC EXERCISES: CPT

## 2024-08-26 NOTE — THERAPY TREATMENT NOTE
Occupational Therapy Treatment Note  [address] [place] [address]     Patient: Justin Londono                                                 Visit Date: 2024  :     1955    Referring practitioner:    DOROTA Mckee  Date of Initial Visit:          Type: THERAPY  Noted: 2024     Visit Diagnoses:  Visit Diagnosis       ICD-10-CM ICD-9-CM   1. Cerebrovascular accident (CVA), unspecified mechanism  I63.9 434.91   2. Right sided weakness  R53.1 728.87         SUBJECTIVE      Subjective:  Pt reports improved IADL performance at home, he has been working outside. He   Reports he is still limited by pain in his R UE.      PAIN: 6/10; R elbow described as aching pain      OBJECTIVE      Objective:  Therapy Education/Self Care 99501   Education offered today    Medbride Code     Ongoing HEP   Thera-putty exercises (Green Thera-Putty)   FM activities for B hands    Timed Minutes        Therapeutic Activities    93568 Comments   Pt completed FM activity of duplicating patterns with small pegs on peg board. Pt used B hands; demo'd more difficulty with R hand. Pt was able to complete independently.     Pt completed three point pinch activity using R hand with min difficulty.     Pt completed FM activity removing beads from green thera-putty. Pt demonstrated mod difficulty with R hand.     Pt complete FM activity with standard tweezers in R hand picking up toothpicks to place in medication bottle. Pt displayed no difficulty with task.      Timed Minutes 30      Therapeutic Exercises    05871 Units Comments   Pt completed B hand power grasp using 7# digi-flex completing 1 set of 20 reps each. Pt then completed B 3-point pinch using 7# digi-flex performing 1 set of 20 reps each digit.                       Timed Minutes 15      Total Timed Treatment:     45   mins  Total Time of Visit:             45   mins         ASSESSMENT/PLAN     GOALS:            Goals                                          Progress Note due by 9/18/24                                                      Recert due by 10/19/24   STG by: 9/18/24 Comments Date Status   Pt will be independent with daily completion of a HEP to address stroke deficits affecting IADL performance.         Pt will be independent with simulated light-moderate IADL tasks demonstrating good safety techniques.        Pt will display improved B UE FMC by completing the 9 hole peg test in 20 seconds or less with R hand and 22 seconds or less with L hand.  Demo'd difficulty with B hands during FM activity. Mod difficulty with R hand.                LTG by: 10/19/24        Pt will improve B hand  strength to 60 lbs with no report of pain.  Completed B hand  strengthening with 7# digi-flex.       Pt will display appropriate motor coordination, visual scanning and reaction time for IADL performance by passing all 4 assessments on the [employer] BITS.        Pt will improve B three point pinch strength to 21 lbs with no report of pain.  Completed B three point pinch strengthening with 7# digi-flex.                                       Assessment/Plan:    ASSESSMENT:   Pt continues to demonstrate improvements in R hand FMC and strength deficits.   Pt has been reporting improved performance in IADL tasks at home, but is still   Limited by R UE pain. Today pt demonstrated limited R shoulder and elbow ROM,   Which is new. Pt reports no injury to arm, just excessive soreness from over-working it.     PLAN:   Will address IADL simulation and BITS activities next session.      SIGNATURE: Cristela Bey OT, [place] License #: [id number]  Electronically Signed on 8/6/2024          [address] [place] [address] [place] [address]  [phone]

## 2024-08-28 ENCOUNTER — HOSPITAL ENCOUNTER (OUTPATIENT)
Facility: HOSPITAL | Age: 69
Discharge: HOME | End: 2024-08-28
Admitting: PHYSICIAN ASSISTANT
Payer: MEDICARE

## 2024-08-28 DIAGNOSIS — R47.01 APHASIA: Primary | ICD-10-CM

## 2024-08-28 DIAGNOSIS — R48.2 APRAXIA: ICD-10-CM

## 2024-08-28 PROCEDURE — 92507 TX SP LANG VOICE COMM INDIV: CPT | Performed by: SPEECH-LANGUAGE PATHOLOGIST

## 2024-08-28 NOTE — PROGRESS NOTES
Speech Language Pathology Treatment Note  [address] [place] [address]    Patient: Justin Londono                                                                                     Visit Date: 2024  :     1955    Referring practitioner:    Maite Dodd PA-C  Date of Initial Visit:       2024          Visit Diagnoses:    ICD-10-CM ICD-9-CM   1. Aphasia  R47.01 784.3   2. Apraxia  R48.2 784.69       SUBJECTIVE     Patient arrived alert and ready for therapy today.     OBJECTIVE   GOALS    Goals                                         STG Comments Status   Patient will improve verbal expressive language skills by completing automatic speech tasks, naming objects/pictures, and completing open-ended phrases/sentences Patient was able to verbally provide 1-9 (average 4.8) correct information units when shown picture scenes from the [facility] picture book (#1-10). He also named pictures w/o demand in the context of reading tasks.  Ongoing       Patient will improve comprehension of spoken language by following 1 step then two step directions presented verbally and/or in writing Patient was able to listen to word descriptions presented verbally and identify the correct response in f/3 with 90% accuracy.  Ongoing   Patient will improve comprehension and expression of written language skills by complete functional reading and writing tasks Patient initially had difficulty reading a open ended phrase and choosing the correct written word from f/3. When given single written words and then written function phrases (eg the one that...) he was able to identify the pictured items in a picture scene with >90% accuracy.  Ongoing   Patient will demonstrate understanding of and use AAC device to augment communication and express wants and needs Application for AAC in progress. Wife signed papers last visit.     Previous:  Patient used AAC keyboard to type the name of words or pictures.  Ongoing   LTG by:        Patient will be able to verbally and or in writing express basic wants and needs in home environment Patient was able to verbally provide an average of 4.8 correct information units given 10 different picture scenes.  Ongoing   Patient will comprehend basic spoken and written information presented in home environment Patient was able to read and identify words and pictures given written words and written description, and identify words given verbal description.  Ongoing           Total Time of Visit:            50  mins         ASSESSMENT/PLAN     ASSESSMENT: Patient showed good improvement with correct information units verbally today. Reading continues to make progress.     PLAN: Continue therapy.     Progress Note Due Date: 9/17/2024  Recertification Due Date: 9/17/2024        Signature:  Zabrina Bhat CCC-SLP, [place] License #: [id number]  Electronically signed on 8/28/2024          [place]. [id number]  [phone]

## 2024-09-04 ENCOUNTER — HOSPITAL ENCOUNTER (OUTPATIENT)
Facility: HOSPITAL | Age: 69
Discharge: HOME | End: 2024-09-04
Admitting: PHYSICIAN ASSISTANT
Payer: MEDICARE

## 2024-09-04 ENCOUNTER — HOSPITAL ENCOUNTER (OUTPATIENT)
Dept: OCCUPATIONAL THERAPY | Facility: HOSPITAL | Age: 69
Setting detail: THERAPIES SERIES
Discharge: HOME | End: 2024-09-04
Payer: MEDICARE

## 2024-09-04 DIAGNOSIS — R47.01 APHASIA: Primary | ICD-10-CM

## 2024-09-04 DIAGNOSIS — I63.9 CEREBROVASCULAR ACCIDENT (CVA), UNSPECIFIED MECHANISM: Primary | ICD-10-CM

## 2024-09-04 DIAGNOSIS — R53.1 RIGHT SIDED WEAKNESS: ICD-10-CM

## 2024-09-04 DIAGNOSIS — R48.2 APRAXIA: ICD-10-CM

## 2024-09-04 PROCEDURE — 92507 TX SP LANG VOICE COMM INDIV: CPT | Performed by: SPEECH-LANGUAGE PATHOLOGIST

## 2024-09-04 PROCEDURE — 97110 THERAPEUTIC EXERCISES: CPT

## 2024-09-04 PROCEDURE — 97530 THERAPEUTIC ACTIVITIES: CPT

## 2024-09-04 NOTE — THERAPY TREATMENT NOTE
Occupational Therapy Treatment Note  [address] [place] [address]    Patient: Justin Londono                                                 Visit Date: 2024  :     1955    Referring practitioner:    DOROTA Mckee  Date of Initial Visit:          Type: THERAPY  Noted: 2024    Visit Diagnoses:    ICD-10-CM ICD-9-CM   1. Cerebrovascular accident (CVA), unspecified mechanism  I63.9 434.91   2. Right sided weakness  R53.1 728.87     SUBJECTIVE     Subjective:  Pt reports he has been spending a lot of time outside the past week doing light yard work and cleaning   Up around the house. Pt reports he is able to do more with his hands than he has previously been able   To.     PAIN: 0/10       OBJECTIVE     Objective     Therapeutic Activities    60891 Comments   Pt completed BITS activities focused on visual scanning, reaction time, B UE AROM and coordination. Accuracy ranged from 68.42 to 95.05%. Reaction time was 5.37 to 1.24 seconds. Visual scannin.05%, 1.24 s RT, 96 hits   Sequence A-Z: 68.42%, 5.37 s RT   Sequence 1-30#: 85.71%, 2.66 s RT      Trail Making Part A completed with the R UE. 0 errors, 14 interruptions and a time of 1:03. Part B completed with 0 errors, 11 interruptions and a time of 1:40.  Bell Cancellation Test completed with the R UE in a time of 3:26 with 0 errors and 0 distractor hits.   Computerized Maze Assessment completed with the R UE in a time of 0:28 with 2 errors.   Visual Scanning and Motor Reaction Assessment completed with the R UE. Level 1 45/50 required targets. Pt was able to pass 1/4 assessment levels.   Re-started trail making A and B once each d/t confusion on instructions.               Timed Minutes 30     Therapeutic Exercises    07586 Comments   Pt completed B hand power grasp using 7# digi-flex completing 1 set of 20 reps each. Pt then completed B hand isolated finger flexion  using 7# digi-flex performing 1 set of 10 reps each digit.     Pt completed B hand/wrist strengthening in all planes using heavy resistance flex bar to simulate opening various jars and containers performing 1 set of 10 reps.    Pt completed B hand  strengthening using the power web completing 1 set of 10 reps each.             Timed Minutes 15     Therapy Education/Self Care 32850   Education offered today Discussed IADL performance recently and difficulties with completion.    Medbride Code    Ongoing HEP   Thera-putty exercises (Green Thera-Putty)   FM activities for B hands    Timed Minutes        Total Timed Treatment:     45   mins  Total Time of Visit:             45   mins         ASSESSMENT/PLAN     GOALS            Goals                                          Progress Note due by 9/18/24                                                      Recert due by 10/19/24   STG by: 9/18/24 Comments Date Status   Pt will be independent with daily completion of a HEP to address stroke deficits affecting IADL performance.          Pt will be independent with simulated light-moderate IADL tasks demonstrating good safety techniques.   Pt reports increase in IADL performance at home with no falls, accidents, or LOBs.        Pt will display improved B UE FMC by completing the 9 hole peg test in 20 seconds or less with R hand and 22 seconds or less with L hand.                  LTG by: 10/19/24        Pt will improve B hand  strength to 60 lbs with no report of pain.  Completed  strengthening activities with 7# digi-flex, flex bar, and power web.        Pt will display appropriate motor coordination, visual scanning and reaction time for IADL performance by passing all 4 assessments on the [employer] BITS.  Completed BITS assessments; passing 1/4. See table above for scores.        Pt will improve B three point pinch strength to 21 lbs with no report of pain.                                           Assessment/Plan     ASSESSMENT:   Pt continues to improve B hand  strength which is reflected in his improved IADL performance at   Home. Pt demonstrated continued deficits in visual scanning and reaction time today during BITS   Activities. Pt demonstrated some improvements with the bell cancellation test, visual scanning, and the   maze BITS assessments.     PLAN:   Will address IADL simulation and BITS activities next session.     SIGNATURE: Cristela Bey OT, [place] License #: [id number]  Electronically Signed on 9/4/2024        [address] [place] [address] [place] [address]  [phone]

## 2024-09-04 NOTE — PROGRESS NOTES
Speech Language Pathology Treatment Note  [address] [place] [address]    Patient: Justin Londono                                                                                     Visit Date: 2024  :     1955    Referring practitioner:    Maite Dodd PA-C  Date of Initial Visit:       2024          Visit Diagnoses:    ICD-10-CM ICD-9-CM   1. Aphasia  R47.01 784.3   2. Apraxia  R48.2 784.69       SUBJECTIVE     Patient arrived alert and ready for therapy today.     OBJECTIVE   GOALS    Goals                                         STG Comments Status   Patient will improve verbal expressive language skills by completing automatic speech tasks, naming objects/pictures, and completing open-ended phrases/sentences Patient was able to verbally provide 1-9 (average 4.8) correct information units when shown picture scenes from the [facility] picture book (#1-10). He also named pictures w/o demand in the context of reading tasks.  Ongoing       Patient will improve comprehension of spoken language by following 1 step then two step directions presented verbally and/or in writing Patient was able to listen to word descriptions presented verbally and identify the correct response in f/3 with 90% accuracy.  Ongoing   Patient will improve comprehension and expression of written language skills by complete functional reading and writing tasks Patient initially had difficulty reading a open ended phrase and choosing the correct written word from f/3. When given single written words and then written function phrases (eg the one that...) he was able to identify the pictured items in a picture scene with >90% accuracy.  Ongoing   Patient will demonstrate understanding of and use AAC device to augment communication and express wants and needs Application for AAC in progress. Application has gone to insurance review.        Ongoing   LTG by:        Patient will be able to verbally and or in writing express basic wants and needs in home environment Patient was able to verbally provide an average of 4.8 correct information units given 10 different picture scenes.   Ongoing   Patient will comprehend basic spoken and written information presented in home environment Patient was able to read and identify words and pictures given written words and written description, and identify words given verbal description.  Ongoing           Total Time of Visit:            50  mins         ASSESSMENT/PLAN     ASSESSMENT: Patient showed good improvement with correct information units verbally today. Reading continues to make progress.     PLAN: Continue therapy.     Progress Note Due Date: 9/17/2024  Recertification Due Date: 9/17/2024        Signature:  Zabrina Bhat CCC-SLP, [place] License #: [id number]  Electronically signed on 9/4/2024          [address] [place] [address]  [place] [address]  [phone]

## 2024-09-09 ENCOUNTER — HOSPITAL ENCOUNTER (OUTPATIENT)
Facility: HOSPITAL | Age: 69
Discharge: HOME | End: 2024-09-09
Admitting: PHYSICIAN ASSISTANT
Payer: MEDICARE

## 2024-09-09 ENCOUNTER — HOSPITAL ENCOUNTER (OUTPATIENT)
Dept: OCCUPATIONAL THERAPY | Facility: HOSPITAL | Age: 69
Setting detail: THERAPIES SERIES
Discharge: HOME | End: 2024-09-09
Payer: MEDICARE

## 2024-09-09 DIAGNOSIS — R47.01 APHASIA: Primary | ICD-10-CM

## 2024-09-09 DIAGNOSIS — I63.9 CEREBROVASCULAR ACCIDENT (CVA), UNSPECIFIED MECHANISM: Primary | ICD-10-CM

## 2024-09-09 DIAGNOSIS — R53.1 RIGHT SIDED WEAKNESS: ICD-10-CM

## 2024-09-09 PROCEDURE — 97535 SELF CARE MNGMENT TRAINING: CPT

## 2024-09-09 PROCEDURE — 92507 TX SP LANG VOICE COMM INDIV: CPT | Performed by: SPEECH-LANGUAGE PATHOLOGIST

## 2024-09-09 PROCEDURE — 97530 THERAPEUTIC ACTIVITIES: CPT

## 2024-09-09 NOTE — THERAPY TREATMENT NOTE
Occupational Therapy Treatment Note  [address] [place] [address]    Patient: Justin Londono                                                 Visit Date: 2024  :     1955    Referring practitioner:    DOROTA Mckee  Date of Initial Visit:          Type: THERAPY  Noted: 2024    Visit Diagnoses:    ICD-10-CM ICD-9-CM   1. Cerebrovascular accident (CVA), unspecified mechanism  I63.9 434.91   2. Right sided weakness  R53.1 728.87     SUBJECTIVE     Subjective:  Pt reports he has been able to spend a lot of time outside and working around the house this past   Week. Pt reports things are going well and he feels like he is making some improvements.     PAIN: 0/10       OBJECTIVE     Objective     Therapeutic Activities    20251 Comments   BITS activities focused on visual scanning, sequencing/memory, attention, B UE AROM and coordination. Accuracy ranged from 37.5 to 92%. Reaction time was 1.14 to 3.21 seconds. VS: B UE, 92%,1.30s RT   Sequencing 1-50#: 89.29%, 3.21s RT, 2:41  Sequencing Letters: 86.67%, 1:14, 2.86s RT   Memory Images: 37.5%, 3 successful trials   Memory Numbers: 70%, 7 successful trials  Memory Words: 66.67%, 6 successful trials    Trail Making Part A completed with the L UE. 0 errors, 3 interruptions and a time of 0:57. Part B completed with 7 errors, 20 interruptions and a time of 2:13.  Bell Cancellation Test completed with the B UE in a time of 2:30 with 0 errors and 0 distractor hits.   Computerized Maze Assessment completed with the L UE in a time of 0:30 with 0 errors.   Visual Scanning and Motor Reaction Assessment completed with the L UE. Level 1 44/50 required targets. Pt was able to pass 2/4 assessment levels.          Timed Minutes 30     Self-Care/IADL Training    23119 Comments   Pt completed light IADL simulation with 12# box. Pt required extended time to complete 5 reps of waist to overhead  shelf box lifts. Pt experienced an increase in B wrist pain with this activity and was unable to tolerate more IADL simulations.                     Timed Minutes 10     Therapy Education/Self Care 42750   Education offered today Discussed HEP compliance.    Medbride Code    Ongoing HEP   Thera-putty exercises (Green Thera-Putty)   FM activities for B hands    Timed Minutes      Total Timed Treatment:     40   mins  Total Time of Visit:             45   mins         ASSESSMENT/PLAN     GOALS            Goals                                          Progress Note due by 9/18/24                                                      Recert due by 10/19/24   STG by: 9/18/24 Comments Date Status   Pt will be independent with daily completion of a HEP to address stroke deficits affecting IADL performance.   Discussed HEP compliance, pt reports he will try to resume exercises.        Pt will be independent with simulated light-moderate IADL tasks demonstrating good safety techniques.  Completed short IADL simulation with 12# box, but pt experienced increase in pain.        Pt will display improved B UE FMC by completing the 9 hole peg test in 20 seconds or less with R hand and 22 seconds or less with L hand.  Completed BITS tx activities focusing on coordination in B UE.                 LTG by: 10/19/24        Pt will improve B hand  strength to 60 lbs with no report of pain.         Pt will display appropriate motor coordination, visual scanning and reaction time for IADL performance by passing all 4 assessments on the [employer] BITS.  Completed BITS assessments, passing 2/4, focusing on visual scanning and reaction time.        Pt will improve B three point pinch strength to 21 lbs with no report of pain.                                          Assessment/Plan     ASSESSMENT:   Pt was able to pass 2 of 4 BITS assessments today with min difficulty. Pt demonstrated improvements with   Divided attention to task during  BITS trail making part B. Pt struggles with memory BITS activities today  And struggled with coordination during visual scanning activities. Pt was unable to tolerate IADL simulation   D/t increase in B wrist pain with lifting.     PLAN:   Will complete progress note next session.     SIGNATURE: Cristela Bey OT, [place] License #: [id number]  Electronically Signed on 9/9/2024        [address]  [place] [address]  [phone]

## 2024-09-09 NOTE — PROGRESS NOTES
Speech Language Pathology Treatment Note  [address] [place] [address]    Patient: Justin Londono                                                                                     Visit Date: 2024  :     1955    Referring practitioner:    Maite Dodd PA-C  Date of Initial Visit:       2024          Visit Diagnoses:    ICD-10-CM ICD-9-CM   1. Aphasia  R47.01 784.3       SUBJECTIVE     Patient arrived alert and ready for therapy today.     OBJECTIVE   GOALS    Goals                                         STG Comments Status   Patient will improve verbal expressive language skills by completing automatic speech tasks, naming objects/pictures, and completing open-ended phrases/sentences Patient was able to repeat single letter sounds and CV syllables with long vowels (AEIOU) with model and cues today. Most difficulty with /t/ (ta, te, ti, to, tu)    Previous: Patient was able to verbally provide 4-12(average 8.2) correct information units when shown picture scenes from the [facility] picture book (#11-21).  Ongoing       Patient will improve comprehension of spoken language by following 1 step then two step directions presented verbally and/or in writing Patient was able to identify pictures when described verbally by slp. Ongoing   Patient will improve comprehension and expression of written language skills by complete functional reading and writing tasks Patient matched picture to written word with 100% accuracy. He had difficulty with spelling single words.  Ongoing   Patient will demonstrate understanding of and use AAC device to augment communication and express wants and needs Application for AAC in progress. Application has gone to insurance review.       Ongoing   LTG by:        Patient will be able to verbally and or in writing express basic wants and needs in home environment Patient was able to  verbally provide an average of 8.2 correct information units given 10 different picture scenes.   Ongoing   Patient will comprehend basic spoken and written information presented in home environment Patient was able to read and identify words and pictures given written words .  Ongoing           Total Time of Visit:            50  mins         ASSESSMENT/PLAN     ASSESSMENT: Patient again showed good improvement with correct information units verbally today. He continues to make progress.     PLAN: Continue therapy.     Progress Note Due Date: 9/17/2024  Recertification Due Date: 9/17/2024        Signature:  Zabrina Bhat CCC-SLP, [place] License #: [id number]  Electronically signed on 9/9/2024          [address] [place] [address], [place] [address]  [phone]

## 2024-09-11 ENCOUNTER — HOSPITAL ENCOUNTER (OUTPATIENT)
Facility: HOSPITAL | Age: 69
Discharge: HOME | End: 2024-09-11
Admitting: PHYSICIAN ASSISTANT
Payer: MEDICARE

## 2024-09-11 DIAGNOSIS — R47.01 APHASIA: ICD-10-CM

## 2024-09-11 DIAGNOSIS — R48.2 APRAXIA: Primary | ICD-10-CM

## 2024-09-11 PROCEDURE — 92507 TX SP LANG VOICE COMM INDIV: CPT | Performed by: SPEECH-LANGUAGE PATHOLOGIST

## 2024-09-11 NOTE — PROGRESS NOTES
Speech Language Pathology Treatment Note  [address] [place] [address]    Patient: Justin Londono                                                                                     Visit Date: 2024  :     1955    Referring practitioner:    Maite Dodd PA-C  Date of Initial Visit:       2024          Visit Diagnoses:    ICD-10-CM ICD-9-CM   1. Apraxia  R48.2 784.69   2. Aphasia  R47.01 784.3       SUBJECTIVE     Patient arrived alert and ready for therapy today.     OBJECTIVE   GOALS    Goals                                         STG Comments Status   Patient will improve verbal expressive language skills by completing automatic speech tasks, naming objects/pictures, and completing open-ended phrases/sentences Patient was able to repeat single letter sounds and CV syllables with long vowels (AEIOU) with model and cues again today.      Previous: Patient was able to verbally provide 4-12(average 8.2) correct information units when shown picture scenes from the [facility] picture book (#11-21).  Ongoing       Patient will improve comprehension of spoken language by following 1 step then two step directions presented verbally and/or in writing Patient was able to follow one step directions involving picture and location after initial cues and continued cues to attend.  Ongoing   Patient will improve comprehension and expression of written language skills by complete functional reading and writing tasks Patient matched picture to written word with 100% accuracy. He was able to spell single words on keypad with limited options 90%  Ongoing   Patient will demonstrate understanding of and use AAC device to augment communication and express wants and needs Application for AAC in progress. Application has gone to insurance review.       Ongoing   LTG by:        Patient will be able to verbally and or in writing  express basic wants and needs in home environment Production of single CV syllables is improving. Automatic speech is improving.   Previous: Patient was able to verbally provide an average of 8.2 correct information units given 10 different picture scenes.   Ongoing   Patient will comprehend basic spoken and written information presented in home environment Patient was able to read and identify words and pictures given written words .  Ongoing           Total Time of Visit:            40  mins         ASSESSMENT/PLAN     ASSESSMENT: Patient  continues to make progress.     PLAN: Continue therapy.     Progress Note Due Date: 9/17/2024  Recertification Due Date: 9/17/2024        Signature:  Zabrina Bhat CCC-SLP, [place] License #: [id number]  Electronically signed on 9/11/2024          [address] [place] [address] [place]. [address]  [phone]

## 2024-09-16 ENCOUNTER — HOSPITAL ENCOUNTER (OUTPATIENT)
Facility: HOSPITAL | Age: 69
Discharge: HOME | End: 2024-09-16
Admitting: PHYSICIAN ASSISTANT
Payer: MEDICARE

## 2024-09-16 ENCOUNTER — HOSPITAL ENCOUNTER (OUTPATIENT)
Dept: OCCUPATIONAL THERAPY | Facility: HOSPITAL | Age: 69
Setting detail: THERAPIES SERIES
Discharge: HOME | End: 2024-09-16
Payer: MEDICARE

## 2024-09-16 DIAGNOSIS — R48.2 APRAXIA: Primary | ICD-10-CM

## 2024-09-16 DIAGNOSIS — R53.1 RIGHT SIDED WEAKNESS: ICD-10-CM

## 2024-09-16 DIAGNOSIS — I63.9 CEREBROVASCULAR ACCIDENT (CVA), UNSPECIFIED MECHANISM: Primary | ICD-10-CM

## 2024-09-16 DIAGNOSIS — R47.01 APHASIA: ICD-10-CM

## 2024-09-16 PROCEDURE — 92507 TX SP LANG VOICE COMM INDIV: CPT | Performed by: SPEECH-LANGUAGE PATHOLOGIST

## 2024-09-16 PROCEDURE — 97110 THERAPEUTIC EXERCISES: CPT

## 2024-09-16 PROCEDURE — 97530 THERAPEUTIC ACTIVITIES: CPT

## 2024-09-18 ENCOUNTER — HOSPITAL ENCOUNTER (OUTPATIENT)
Facility: HOSPITAL | Age: 69
Discharge: HOME | End: 2024-09-18
Admitting: PHYSICIAN ASSISTANT
Payer: MEDICARE

## 2024-09-18 DIAGNOSIS — R47.01 APHASIA: ICD-10-CM

## 2024-09-18 DIAGNOSIS — R48.2 APRAXIA: Primary | ICD-10-CM

## 2024-09-18 PROCEDURE — 92507 TX SP LANG VOICE COMM INDIV: CPT | Performed by: SPEECH-LANGUAGE PATHOLOGIST

## 2024-09-19 ENCOUNTER — OFFICE VISIT (OUTPATIENT)
Dept: GASTROENTEROLOGY | Facility: CLINIC | Age: 69
End: 2024-09-19
Payer: MEDICARE

## 2024-09-19 VITALS
HEART RATE: 66 BPM | TEMPERATURE: 97.5 F | SYSTOLIC BLOOD PRESSURE: 134 MMHG | HEIGHT: 69 IN | WEIGHT: 234 LBS | OXYGEN SATURATION: 98 % | DIASTOLIC BLOOD PRESSURE: 80 MMHG | BODY MASS INDEX: 34.66 KG/M2

## 2024-09-19 DIAGNOSIS — Z86.010 HX OF COLONIC POLYP: ICD-10-CM

## 2024-09-19 DIAGNOSIS — Z12.11 ENCOUNTER FOR SCREENING FOR MALIGNANT NEOPLASM OF COLON: Primary | ICD-10-CM

## 2024-09-19 DIAGNOSIS — D68.9 COAGULOPATHY: ICD-10-CM

## 2024-09-20 ENCOUNTER — TELEPHONE (OUTPATIENT)
Dept: GASTROENTEROLOGY | Facility: CLINIC | Age: 69
End: 2024-09-20
Payer: MEDICARE

## 2024-09-20 ENCOUNTER — PREP FOR SURGERY (OUTPATIENT)
Dept: OTHER | Facility: HOSPITAL | Age: 69
End: 2024-09-20
Payer: MEDICARE

## 2024-09-20 DIAGNOSIS — Z86.010 HX OF COLONIC POLYP: ICD-10-CM

## 2024-09-20 DIAGNOSIS — Z12.11 ENCOUNTER FOR SCREENING FOR MALIGNANT NEOPLASM OF COLON: Primary | ICD-10-CM

## 2024-09-23 ENCOUNTER — HOSPITAL ENCOUNTER (OUTPATIENT)
Dept: OCCUPATIONAL THERAPY | Facility: HOSPITAL | Age: 69
Setting detail: THERAPIES SERIES
Discharge: HOME | End: 2024-09-23
Payer: MEDICARE

## 2024-09-23 DIAGNOSIS — R53.1 RIGHT SIDED WEAKNESS: ICD-10-CM

## 2024-09-23 DIAGNOSIS — I63.9 CEREBROVASCULAR ACCIDENT (CVA), UNSPECIFIED MECHANISM: Primary | ICD-10-CM

## 2024-09-23 PROCEDURE — 97110 THERAPEUTIC EXERCISES: CPT

## 2024-09-23 PROCEDURE — 97530 THERAPEUTIC ACTIVITIES: CPT

## 2024-09-25 ENCOUNTER — HOSPITAL ENCOUNTER (OUTPATIENT)
Facility: HOSPITAL | Age: 69
Discharge: HOME | End: 2024-09-25
Admitting: PHYSICIAN ASSISTANT
Payer: MEDICARE

## 2024-09-25 DIAGNOSIS — R47.01 APHASIA: ICD-10-CM

## 2024-09-25 DIAGNOSIS — R48.2 APRAXIA: Primary | ICD-10-CM

## 2024-09-25 PROCEDURE — 92507 TX SP LANG VOICE COMM INDIV: CPT | Performed by: SPEECH-LANGUAGE PATHOLOGIST

## 2024-09-26 ENCOUNTER — TELEPHONE (OUTPATIENT)
Dept: GASTROENTEROLOGY | Facility: CLINIC | Age: 69
End: 2024-09-26

## 2024-09-26 ENCOUNTER — OFFICE VISIT (OUTPATIENT)
Dept: INTERNAL MEDICINE | Facility: CLINIC | Age: 69
End: 2024-09-26
Payer: MEDICARE

## 2024-09-26 VITALS
OXYGEN SATURATION: 99 % | HEIGHT: 69 IN | HEART RATE: 78 BPM | WEIGHT: 234 LBS | DIASTOLIC BLOOD PRESSURE: 84 MMHG | SYSTOLIC BLOOD PRESSURE: 146 MMHG | BODY MASS INDEX: 34.66 KG/M2

## 2024-09-26 DIAGNOSIS — R60.0 BILATERAL LOWER EXTREMITY EDEMA: Primary | ICD-10-CM

## 2024-09-26 DIAGNOSIS — M79.604 PAIN IN RIGHT LEG: ICD-10-CM

## 2024-09-26 DIAGNOSIS — R53.1 RIGHT SIDED WEAKNESS: ICD-10-CM

## 2024-09-26 PROCEDURE — 3077F SYST BP >= 140 MM HG: CPT

## 2024-09-26 PROCEDURE — 99214 OFFICE O/P EST MOD 30 MIN: CPT

## 2024-09-26 PROCEDURE — 1126F AMNT PAIN NOTED NONE PRSNT: CPT

## 2024-09-26 PROCEDURE — 3079F DIAST BP 80-89 MM HG: CPT

## 2024-09-26 NOTE — TELEPHONE ENCOUNTER
Caller: Lisy Londono    Relationship to patient: Emergency Contact    Best call back number: [phone]     Patient is needing: PATIENT'S SPOUSE STATES HE NEEDS TO CANCEL HIS SCOPE ON 11/8/24.  THEY WANT HIM TO WAIT UNTIL A YEAR AFTER HIS STROKE BEFORE HE HAS THIS PROCEDURE. SHE WILL CALL BACK TO SCHEDULE.

## 2024-09-30 ENCOUNTER — HOSPITAL ENCOUNTER (OUTPATIENT)
Dept: OCCUPATIONAL THERAPY | Facility: HOSPITAL | Age: 69
Setting detail: THERAPIES SERIES
Discharge: HOME | End: 2024-09-30
Payer: MEDICARE

## 2024-09-30 ENCOUNTER — HOSPITAL ENCOUNTER (OUTPATIENT)
Facility: HOSPITAL | Age: 69
Discharge: HOME | End: 2024-09-30
Admitting: PHYSICIAN ASSISTANT
Payer: MEDICARE

## 2024-09-30 DIAGNOSIS — R53.1 RIGHT SIDED WEAKNESS: ICD-10-CM

## 2024-09-30 DIAGNOSIS — R47.01 APHASIA: ICD-10-CM

## 2024-09-30 DIAGNOSIS — R48.2 APRAXIA: Primary | ICD-10-CM

## 2024-09-30 DIAGNOSIS — I63.9 CEREBROVASCULAR ACCIDENT (CVA), UNSPECIFIED MECHANISM: Primary | ICD-10-CM

## 2024-09-30 PROCEDURE — 97110 THERAPEUTIC EXERCISES: CPT

## 2024-09-30 PROCEDURE — 97530 THERAPEUTIC ACTIVITIES: CPT

## 2024-09-30 PROCEDURE — 92507 TX SP LANG VOICE COMM INDIV: CPT | Performed by: SPEECH-LANGUAGE PATHOLOGIST

## 2024-09-30 PROCEDURE — 97535 SELF CARE MNGMENT TRAINING: CPT

## 2024-09-30 NOTE — THERAPY TREATMENT NOTE
Occupational Therapy Treatment Note  [address] [place] [address]    Patient: Justin Lodnono                                                 Visit Date: 2024  :     1955    Referring practitioner:    DOROTA Mckee  Date of Initial Visit:          Type: THERAPY  Episode: CVA/R-sided weakness    Visit Diagnoses:    ICD-10-CM ICD-9-CM   1. Cerebrovascular accident (CVA), unspecified mechanism  I63.9 434.91   2. Right sided weakness  R53.1 728.87     SUBJECTIVE     Subjective:  Pt reports he has not been able to work outside much this past week d/t the rain. Pt reports he has been   Playing video games, visiting with friends, and piddling around the house with his wife.     PAIN: 0/10       OBJECTIVE     Self-Care/IADL Training    97553 Comments   Pt tolerated  moderate IADL simulated tasks with 17.5# box waist <> floor transfers 2 sets 10 reps. Pt demo'd fatigue but min difficulty and no increase in pain.         Timed Minutes 10     Therapeutic Exercises    52424 Comments   Pt completed B hand power grasp using 9# digi-flex completing 1 set of 20 reps each. Pt then completed B hand isolated finger flexion using 7# digi-flex performing 1 set of 10 reps each digit.     Pt completed B hand/wrist strengthening in all planes using moderate resistance flex bar to simulate opening various jars and containers performing 1 set of 10 reps.    Pt tolerated 3 min on the arm bike at level 5 before reporting increase in pain in L shoulder.     Pt performed 10 reps of shoulder flexion exercises using 2# dowel bar. Pt reported increase in L shoulder pain.     Timed Minutes 20     Therapeutic Activities    81636 Comments   Pt completed FM activity of nuts and bolts board using B hands with min difficulty.        Timed Minutes 10     Therapy Education/Self Care 25696   Education offered today HEP performance    Medbride Code    Ongoing HEP    Thera-putty exercises (Green Thera-Putty)   FM activities for B hands    Timed Minutes      Total Timed Treatment:     40   mins  Total Time of Visit:             40   mins         ASSESSMENT/PLAN     GOALS            Goals                                          Progress Note due by 10/16/24                                                      Recert due by 10/19/24   STG by: 10/16/24 Comments Date Status   Pt will be independent with daily completion of a HEP to address stroke deficits affecting IADL performance.  Reports some compliance with HEP   9/16/24  Ongoing    Pt will be independent with simulated light-moderate IADL tasks demonstrating good safety techniques.  Simulated moderate IADL tasks with 17.5# box waist <> floor transfers. Demo'd fatigue but min difficulty.   9/16/24  Progressing    Pt will display improved B UE FMC by completing the 9 hole peg test in 20 seconds or less with R hand and 22 seconds or less with L hand.  Pt was able to complete FM activities with B hands with min difficulty today.   9/16/24  Progressing            LTG by: 10/19/24        Pt will improve B hand  strength to 60 lbs with no report of pain.  Completed digi flex  strengthening activities.   9/16/24  Ongoing    Pt will display appropriate motor coordination, visual scanning and reaction time for IADL performance by passing all 4 assessments on the [employer] BITS.    9/16/24  Progressing    Pt will improve B three point pinch strength to 21 lbs with no report of pain.  Completed B hand isolated finger strengthening using digi flex.   9/16/24  Ongoing    Pt will increase B UE strength to 5/5 for improved performance with IADL tasks and functional mobility. Pt performed 10 reps of shoulder flexion exercises using 2# dowel bar. Pt reported increase in L shoulder pain.   9/30/24  New                          Assessment/Plan     ASSESSMENT:   Pt continues to demonstrate weakness and pain in L shoulder, new goal added  "today to address this. Pt continues to demo improvements in B FMC and B hand  strength, and reports functional improvement with FM skills during IADL tasks at home. Pt tolerated IADL simulation better today than previous sessions, but still reports difficulties with L LE \"giving out\" during heavy tasks.     PLAN:   Will continue to focus on B  and pinch strength, B FMC, IADL performance, and visual scanning skills.     SIGNATURE: Cristela Bey OT, [place] License #: [id number]  Electronically Signed on 9/30/2024        [address] [place] [address] [place] [address]  [phone]    "

## 2024-09-30 NOTE — PROGRESS NOTES
Speech Language Pathology Treatment Note  [address] [place] [address]    Patient: Justin Londono                                                                                     Visit Date: 2024  :     1955    Referring practitioner:    Maite Dodd PA-C  Date of Initial Visit:       2024          Visit Diagnoses:    ICD-10-CM ICD-9-CM   1. Apraxia  R48.2 784.69   2. Aphasia  R47.01 784.3       SUBJECTIVE     Patient arrived alert and ready for therapy today.     OBJECTIVE   GOALS    Goals                                         STG Comments Status   Patient will improve verbal expressive language skills by completing automatic speech tasks, naming objects/pictures, and completing open-ended phrases/sentences Patient was able to count to 20 without cue. He was not able to complete ABC's  Patient was able to CV syllables of short and long vowels with model and cues.     Given a carrier phrase for each word and picture with written word, patient was able to name x20 without cues. He was able to name 16 more with either significant self cuing or model and cue. He was able to come close to producing 12 more and was unable to produce or approximate the remaining 2.     Patient was able to verbally provide 3-5 information units for unusual picture scenes on [employer]men.  Ongoing       Patient will improve comprehension of spoken language by following 1 step then two step directions presented verbally and/or in writing Previous: Patient was able to follow one step directions with orthographic cue.  Ongoing   Patient will improve comprehension and expression of written language skills by complete functional reading and writing tasks Previous: patient matched picture to written word with 90% accuracy. He was able to arrange words in short object sentences.   Ongoing   Patient will demonstrate understanding of and use AAC device  to augment communication and express wants and needs Application for AAC in progress. Application has gone to insurance review. TD is waiting a call from patient's wife, Patient indicated that they have been leaving messages back and forth.       Ongoing   LTG by:        Patient will be able to verbally and or in writing express basic wants and needs in home environment Production of single CV syllables is improving. Naming with carrier phrase is improving.  Ongoing   Patient will comprehend basic spoken and written information presented in home environment Previous: patient was able to read and identify words and pictures given written words .  Ongoing           Total Time of Visit:            45  mins         ASSESSMENT/PLAN     ASSESSMENT: Patient  continues to make good progress.     PLAN: Continue therapy.     Progress Note Due Date: 10/14/2024  Recertification Due Date: 12/14/2024        Signature:  Zabrina Bhat CCC-SLP, [place] License #: [id number]  Electronically signed on 9/30/2024          [facility] [place]. [address]  [phone]

## 2024-10-02 ENCOUNTER — HOSPITAL ENCOUNTER (OUTPATIENT)
Facility: HOSPITAL | Age: 69
Setting detail: THERAPIES SERIES
Discharge: HOME | End: 2024-10-02
Payer: MEDICARE

## 2024-10-02 DIAGNOSIS — R48.2 APRAXIA: Primary | ICD-10-CM

## 2024-10-02 DIAGNOSIS — R47.01 APHASIA: ICD-10-CM

## 2024-10-02 PROCEDURE — 92507 TX SP LANG VOICE COMM INDIV: CPT | Performed by: SPEECH-LANGUAGE PATHOLOGIST

## 2024-10-02 NOTE — PROGRESS NOTES
Speech Language Pathology Treatment Note  [address] [place] [address]    Patient: Justin Londono                                                                                     Visit Date: 10/2/2024  :     1955    Referring practitioner:    Maite Dodd PA-C  Date of Initial Visit:       2024          Visit Diagnoses:    ICD-10-CM ICD-9-CM   1. Apraxia  R48.2 784.69   2. Aphasia  R47.01 784.3       SUBJECTIVE     Patient arrived alert and ready for therapy today.     OBJECTIVE   GOALS    Goals                                         STG Comments Status   Patient will improve verbal expressive language skills by completing automatic speech tasks, naming objects/pictures, and completing open-ended phrases/sentences Given picture cards patient named 12 pictures, he was able to repeat 17 with mod/max cues. He attempted to complete 8 more. Patient became fatigued with this activity as it takes considerable effort.     32% named Ongoing       Patient will improve comprehension of spoken language by following 1 step then two step directions presented verbally and/or in writing Patient was able to follow one step directions involving attributes and also with two simple objects with orthographic cue with 90% accuracy. Continued difficulty with sequential 2 step (first then) directions.  Ongoing   Patient will improve comprehension and expression of written language skills by complete functional reading and writing tasks Matched picture to written sentence with f/4 with 90% accuracy. More difficulty with answering questions from short written paragraph.  Ongoing   Patient will demonstrate understanding of and use AAC device to augment communication and express wants and needs Application for AAC completed. Patient will bring to therapy when he receives it.        Ongoing   LTG by:        Patient will be able to verbally and or in  writing express basic wants and needs in home environment Naming continues to slowly improve. Able to type single words.  Ongoing   Patient will comprehend basic spoken and written information presented in home environment Able to read single sentences and match to picture. Difficulty with longer sentences, as he attempts to read them aloud. Able to follow one step verbal/written direction with two simple objects.  Ongoing           Total Time of Visit:            45  mins         ASSESSMENT/PLAN     ASSESSMENT: Patient  continues to make good progress.     PLAN: Continue therapy. Bring SGD to therapy when it arrives.     Progress Note Due Date: 10/14/2024  Recertification Due Date: 12/14/2024        Signature:  Zabrina Bhat, CCC-SLP, [place] License #: [id number]  Electronically signed on 10/2/2024          [place]. [id number]  [phone]

## 2024-10-07 ENCOUNTER — HOSPITAL ENCOUNTER (OUTPATIENT)
Dept: ULTRASOUND IMAGING | Facility: HOSPITAL | Age: 69
Discharge: HOME | End: 2024-10-07
Payer: MEDICARE

## 2024-10-07 ENCOUNTER — HOSPITAL ENCOUNTER (OUTPATIENT)
Dept: OCCUPATIONAL THERAPY | Facility: HOSPITAL | Age: 69
Setting detail: THERAPIES SERIES
Discharge: HOME | End: 2024-10-07
Payer: MEDICARE

## 2024-10-07 DIAGNOSIS — R53.1 RIGHT SIDED WEAKNESS: ICD-10-CM

## 2024-10-07 DIAGNOSIS — I63.9 CEREBROVASCULAR ACCIDENT (CVA), UNSPECIFIED MECHANISM: Primary | ICD-10-CM

## 2024-10-07 PROCEDURE — 97110 THERAPEUTIC EXERCISES: CPT

## 2024-10-07 PROCEDURE — 93971 EXTREMITY STUDY: CPT

## 2024-10-07 PROCEDURE — 97530 THERAPEUTIC ACTIVITIES: CPT

## 2024-10-07 NOTE — THERAPY TREATMENT NOTE
"                                                                Occupational Therapy Treatment Note  [address] [place] [address]    Patient: Justin Londono                                                 Visit Date: 10/7/2024  :     1955    Referring practitioner:    DOROTA Mckee  Date of Initial Visit:          Type: THERAPY  Episode: CVA/R-sided weakness    Visit Diagnoses:    ICD-10-CM ICD-9-CM   1. Cerebrovascular accident (CVA), unspecified mechanism  I63.9 434.91   2. Right sided weakness  R53.1 728.87     SUBJECTIVE     Subjective:  Pt reports he has been able to do more work this week and was able to fix his golf cart. He reports he   Was able to do more of the work than he thought he would be able to do with his hands. Pt had US   Performed on R LE today for possible DVT, US report states \"No evidence of deep venous thrombosis of the right lower extremity.Probable popliteal cyst.\" Pt reports his new AAC device was delivered and he is   Excited to get to practice using it with speech therapy.     PAIN: 1-2/10       OBJECTIVE     Therapeutic Exercises    86308 Comments   Attempted B shoulder exercises in supine with 2# dowel bar. Pt was able to tolerate 1 set of 15 reps of shoulder flexion to 90 degrees. Pt was not able to tolerate bench press, forward or backward rows, or external/internal rotation d/t pain in R bicep/tricep.  Pt experienced dizziness with nystagmus in B eyes when he went from sitting to supine, took approx. 1 min to pass. Pt did not experience dizziness when performing supine to sitting.                Timed Minutes 15     Therapeutic Activities    92378 Comments   Trail Making Part A completed with the R UE. 4 errors, 9 interruptions and a time of 0:46. Part B completed with 16 errors, 13 interruptions and a time of 1:30.    Visual Scanning and Motor Reaction Assessment completed with the B UE. Level 1 50/50 required targets. Level 2 53/40 required targets. Level " 3 27/30 required targets.     Remainder of BITS activities focused on B UE AROM and coordination. Accuracy ranged from 50 to 100%. Reaction time was 3.63 to 1.66 seconds.   Sequence (1-20): 100%, 0:33s, 1.66s RT   Sequence (A-Z): 74.29%, 1:34, 3.63s RT   Memory (letters): 63.64%, 7 successful trials   Memory (words & images): 50%, 5 successful trials   - Cues for one combination      Timed Minutes 30     Therapy Education/Self Care 10229   Education offered today Discussed lack of HEP performance at home. Educated pt on importance of allowing dizziness to pass for safety and fall prevention.    Medbride Code    Ongoing HEP   Thera-putty exercises (Green Thera-Putty)   FM activities for B hands    Timed Minutes      Total Timed Treatment:     40   mins  Total Time of Visit:             40   mins         ASSESSMENT/PLAN     GOALS            Goals                                          Progress Note due by 10/16/24                                                      Recert due by 10/19/24   STG by: 10/16/24 Comments Date Status   Pt will be independent with daily completion of a HEP to address stroke deficits affecting IADL performance.  Discussed lack of HEP performance at home.   9/16/24  Ongoing    Pt will be independent with simulated light-moderate IADL tasks demonstrating good safety techniques.    9/16/24  Progressing    Pt will display improved B UE FMC by completing the 9 hole peg test in 20 seconds or less with R hand and 22 seconds or less with L hand.    9/16/24  Progressing            LTG by: 10/19/24        Pt will improve B hand  strength to 60 lbs with no report of pain.    9/16/24  Ongoing    Pt will display appropriate motor coordination, visual scanning and reaction time for IADL performance by passing all 4 assessments on the Gl[employer] BITS.  Improvements with Visual Scanning and Motor Reaction passing 2/4 levels.   9/16/24  Progressing    Pt will improve B three point pinch strength to 21 lbs  with no report of pain.    9/16/24  Ongoing    Pt will increase B UE strength to 5/5 for improved performance with IADL tasks and functional mobility. Attempted B shoulder exercises in supine with 2# dowel bar. Pt was able to tolerate 1 set of 15 reps of shoulder flexion to 90 degrees. Pt was not able to tolerate bench press, forward or backward rows, or external/internal rotation d/t pain in R bicep/tricep.   9/30/24  New                          Assessment/Plan     ASSESSMENT:   Pt continues to demonstrate improvements with visual scanning and motor coordination skills measured by BITS activities. Pt struggled with B shoulder strengthening today d/t pain in R bicep/tricep region. Pt exhibited symptoms of dizziness and nystagmus of B eyes when performing sitting to supine. He reports this happens from time to time with change of position and that is usually passes within 1-2 minutes. Educated pt on importance of allowing dizziness to pass for safety and fall prevention.     PLAN:   Will complete PN next session.     SIGNATURE: Cristela Bey OT, [place] License #: [id number]  Electronically Signed on 10/7/2024        [facility] [place] [address]  [phone]

## 2024-10-09 ENCOUNTER — HOSPITAL ENCOUNTER (OUTPATIENT)
Facility: HOSPITAL | Age: 69
Setting detail: THERAPIES SERIES
Discharge: HOME | End: 2024-10-09
Payer: MEDICARE

## 2024-10-09 DIAGNOSIS — R48.2 APRAXIA: Primary | ICD-10-CM

## 2024-10-09 DIAGNOSIS — R47.01 APHASIA: ICD-10-CM

## 2024-10-09 PROCEDURE — 92507 TX SP LANG VOICE COMM INDIV: CPT | Performed by: SPEECH-LANGUAGE PATHOLOGIST

## 2024-10-09 NOTE — PROGRESS NOTES
Speech Language Pathology Treatment Note  [address] [place] [address]    Patient: Justin Londono                                                                                     Visit Date: 10/9/2024  :     1955    Referring practitioner:    Maite Dodd PA-C  Date of Initial Visit:       2024          Visit Diagnoses:    ICD-10-CM ICD-9-CM   1. Apraxia  R48.2 784.69   2. Aphasia  R47.01 784.3       SUBJECTIVE     Patient arrived alert and ready for therapy today. He brought his new TD SGD. It had not been set up for him yet.     OBJECTIVE   GOALS    Goals                                         STG Comments Status   Patient will improve verbal expressive language skills by completing automatic speech tasks, naming objects/pictures, and completing open-ended phrases/sentences Previous: Given picture cards patient named 12 pictures, he was able to repeat 17 with mod/max cues. He attempted to complete 8 more. Patient became fatigued with this activity as it takes considerable effort.     32% named Ongoing       Patient will improve comprehension of spoken language by following 1 step then two step directions presented verbally and/or in writing Patient followed verbal directions in context of setting up TD device with multiple cues.     Previous: Patient was able to follow one step directions involving attributes and also with two simple objects with orthographic cue with 90% accuracy. Continued difficulty with sequential 2 step (first then) directions.  Ongoing   Patient will improve comprehension and expression of written language skills by complete functional reading and writing tasks With keyboard on TD device and using predictive text bar, patient was able to type single words given a line drawing picture of an object with 70% accuracy.  Ongoing   Patient will demonstrate understanding of and use AAC device to augment  communication and express wants and needs Patient has received TD SGD. He brought it to therapy today. He had downloaded the Core First pageset which is more appropriate for children. He/his wife had not yet set up a TD account. Discussed with wife how to set up the account and download Aphasia pageset for Justin. She will have this done by next visit in order for me to help him continue to set up and learn to use his device. Patient was able to demonstrate turning on the device and using the keyboard with predictive text.   Ongoing   LTG by:        Patient will be able to verbally and or in writing express basic wants and needs in home environment Naming continues to slowly improve. Able to type single words on SGD using predictive text bar.  Ongoing   Patient will comprehend basic spoken and written information presented in home environment Able  follow simple directions in context of therapy today.  Ongoing       Total Time of Visit:            45  mins         ASSESSMENT/PLAN     ASSESSMENT: Patient  continues to make good progress.     PLAN: Continue therapy. Bring SGD to therapy for set up and instruction.     Progress Note Due Date: 10/14/2024  Recertification Due Date: 12/14/2024        Signature:  Zabrina Bhat CCC-SLP, [place] License #: [id number]  Electronically signed on 10/9/2024          [place] [address]  [phone]

## 2024-10-14 ENCOUNTER — HOSPITAL ENCOUNTER (OUTPATIENT)
Facility: HOSPITAL | Age: 69
Setting detail: THERAPIES SERIES
Discharge: HOME | End: 2024-10-14
Payer: MEDICARE

## 2024-10-14 DIAGNOSIS — R48.2 APRAXIA: Primary | ICD-10-CM

## 2024-10-14 DIAGNOSIS — R47.01 APHASIA: ICD-10-CM

## 2024-10-14 PROCEDURE — 92609 USE OF SPEECH DEVICE SERVICE: CPT | Performed by: SPEECH-LANGUAGE PATHOLOGIST

## 2024-10-14 PROCEDURE — 92507 TX SP LANG VOICE COMM INDIV: CPT | Performed by: SPEECH-LANGUAGE PATHOLOGIST

## 2024-10-15 NOTE — PROGRESS NOTES
Speech Language Pathology Treatment Note  [address] [place] [address]    Patient: Justin Londono                                                                                     Visit Date: 10/14/2024  :     1955    Referring practitioner:    Maite Dodd PA-C  Date of Initial Visit:       2024          Visit Diagnoses:    ICD-10-CM ICD-9-CM   1. Apraxia  R48.2 784.69   2. Aphasia  R47.01 784.3       SUBJECTIVE     Patient arrived alert and ready for therapy today. He brought his new TD SGD. It had not been set up for him yet.     OBJECTIVE   GOALS    Goals                                         STG Comments Status   Patient will improve verbal expressive language skills by completing automatic speech tasks, naming objects/pictures, and completing open-ended phrases/sentences Patient was able to type words given line drawing picture cards when not able to name them. No difficulty noted with spelling. He accessed the keyboard on the tablet with one finger.  Ongoing       Patient will improve comprehension of spoken language by following 1 step then two step directions presented verbally and/or in writing Patient followed verbal directions in context of setting up TD device with multiple cues.     Previous: Patient was able to follow one step directions involving attributes and also with two simple objects with orthographic cue with 90% accuracy. Continued difficulty with sequential 2 step (first then) directions.  Ongoing   Patient will improve comprehension and expression of written language skills by complete functional reading and writing tasks Patient was able to type words as noted above. He was also able to type words and his user name/password on his Tobii device without cues.  Ongoing   Patient will demonstrate understanding of and use AAC device to augment communication and express wants and needs Patient brought  his TD SGD. SLP set up aphasia page-set, as snap pageset they had installed is not appropriate for a literate adult. Patient was able to indicate preferences for going out to eat (mexican food, food items, etc) and show how to program individualized items on his SGD. 30 minutes of therapy spend in programming and directly practicing on the device.   Ongoing   LTG by:        Patient will be able to verbally and or in writing express basic wants and needs in home environment Naming continues to slowly improve. Able to type single words on SGD using predictive text bar.  Ongoing   Patient will comprehend basic spoken and written information presented in home environment Able  follow simple directions in context of therapy today.  Ongoing       Total Time of Visit:            55  mins         ASSESSMENT/PLAN     ASSESSMENT: Patient  continues to make good progress.     PLAN: Continue therapy. Bring SGD to therapy for instruction and practice.     Progress Note Due Date: 11/14/2024  Recertification Due Date: 12/14/2024        Signature:  Zabrina Bhat CCC-SLP, [place] License #: [id number]  Electronically signed on 10/15/2024          [place] [address]  [phone]

## 2024-10-16 ENCOUNTER — HOSPITAL ENCOUNTER (OUTPATIENT)
Facility: HOSPITAL | Age: 69
Setting detail: THERAPIES SERIES
Discharge: HOME | End: 2024-10-16
Payer: MEDICARE

## 2024-10-16 DIAGNOSIS — R47.01 APHASIA: ICD-10-CM

## 2024-10-16 DIAGNOSIS — R48.2 APRAXIA: Primary | ICD-10-CM

## 2024-10-16 PROCEDURE — 92507 TX SP LANG VOICE COMM INDIV: CPT | Performed by: SPEECH-LANGUAGE PATHOLOGIST

## 2024-10-16 PROCEDURE — 92609 USE OF SPEECH DEVICE SERVICE: CPT | Performed by: SPEECH-LANGUAGE PATHOLOGIST

## 2024-10-16 NOTE — PROGRESS NOTES
Speech Language Pathology Treatment Note  [address] [place] [address]    Patient: Justin Londono                                                                                     Visit Date: 10/16/2024  :     1955    Referring practitioner:    Maite Dodd PA-C  Date of Initial Visit:       2024          Visit Diagnoses:    ICD-10-CM ICD-9-CM   1. Apraxia  R48.2 784.69   2. Aphasia  R47.01 784.3       SUBJECTIVE     Patient arrived alert and ready for therapy today. He stated that he was feeling better today, that he had felt off on Monday.     OBJECTIVE   GOALS    Goals                                         STG Comments Status   Patient will improve verbal expressive language skills by completing automatic speech tasks, naming objects/pictures, and completing open-ended phrases/sentences Patient named his wife, and two of his children in the context of setting up SGD.  Ongoing       Patient will improve comprehension of spoken language by following 1 step then two step directions presented verbally and/or in writing Patient followed verbal directions in context of setting up TD device with multiple cues.    Ongoing   Patient will improve comprehension and expression of written language skills by complete functional reading and writing tasks Patient was able to type words as noted above. He was also able to type words and his user name/password on his Tobii device without cues.  Ongoing   Patient will demonstrate understanding of and use AAC device to augment communication and express wants and needs Patient brought his TD SGD. SLP continued set up of pages and links with practicing. Patient was able to find word list categories for food, clothing, people and date/time. 27 minutes of therapy spend in programming and directly practicing on the device.   Ongoing   LTG by:        Patient will be able to verbally and or in  writing express basic wants and needs in home environment Naming continues to slowly improve. Able to type children's names.  Ongoing   Patient will comprehend basic spoken and written information presented in home environment Able  follow simple directions in context of therapy today.  Ongoing     25519 Speech/Language/Cognitive Treatment, [address]65 Therapeutic services for use of speech-generating device, including programming and modification  Total Time of Visit:            55  mins         ASSESSMENT/PLAN     ASSESSMENT: Patient  continues to make good progress.     PLAN: Continue therapy. Work on setting up SGD items at home. Bring SGD to therapy for instruction and practice.     Progress Note Due Date: 11/14/2024  Recertification Due Date: 12/14/2024        Signature:  Zabrina Bhat CCC-SLP, [place] License #: [id number]  Electronically signed on 10/16/2024          [facility] [place] [address]  [phone]

## 2024-10-21 ENCOUNTER — HOSPITAL ENCOUNTER (OUTPATIENT)
Facility: HOSPITAL | Age: 69
Setting detail: THERAPIES SERIES
Discharge: HOME | End: 2024-10-21
Payer: MEDICARE

## 2024-10-21 ENCOUNTER — HOSPITAL ENCOUNTER (OUTPATIENT)
Dept: OCCUPATIONAL THERAPY | Facility: HOSPITAL | Age: 69
Setting detail: THERAPIES SERIES
Discharge: HOME | End: 2024-10-21
Payer: MEDICARE

## 2024-10-21 DIAGNOSIS — R47.01 APHASIA: ICD-10-CM

## 2024-10-21 DIAGNOSIS — R48.2 APRAXIA: Primary | ICD-10-CM

## 2024-10-21 DIAGNOSIS — R53.1 RIGHT SIDED WEAKNESS: ICD-10-CM

## 2024-10-21 DIAGNOSIS — I63.9 CEREBROVASCULAR ACCIDENT (CVA), UNSPECIFIED MECHANISM: Primary | ICD-10-CM

## 2024-10-21 PROCEDURE — 92507 TX SP LANG VOICE COMM INDIV: CPT | Performed by: SPEECH-LANGUAGE PATHOLOGIST

## 2024-10-21 PROCEDURE — 92609 USE OF SPEECH DEVICE SERVICE: CPT | Performed by: SPEECH-LANGUAGE PATHOLOGIST

## 2024-10-21 PROCEDURE — 97110 THERAPEUTIC EXERCISES: CPT

## 2024-10-21 NOTE — PROGRESS NOTES
Speech Language Pathology Treatment Note  [address] [place] [address]    Patient: Justin Londono                                                                                     Visit Date: 10/21/2024  :     1955    Referring practitioner:    Maite Dodd PA-C  Date of Initial Visit:       2024          Visit Diagnoses:    ICD-10-CM ICD-9-CM   1. Apraxia  R48.2 784.69   2. Aphasia  R47.01 784.3       SUBJECTIVE     Patient arrived alert and ready for therapy today.     OBJECTIVE   GOALS    Goals                                         STG Comments Status   Patient will improve verbal expressive language skills by completing automatic speech tasks, naming objects/pictures, and completing open-ended phrases/sentences Patient continues to use social greetings and automatic responses. He had difficulty naming objects today.    Ongoing       Patient will improve comprehension of spoken language by following 1 step then two step directions presented verbally and/or in writing Patient followed verbal directions in context of setting up TD device with multiple cues.    Ongoing   Patient will improve comprehension and expression of written language skills by complete functional reading and writing tasks Patient was able to type some words in the context of programming on SGD. He had difficulty assembling sentences with written and verbal cues.  Ongoing   Patient will demonstrate understanding of and use AAC device to augment communication and express wants and needs Patient brought his TD SGD. SLP continued set up of pages and links with practicing. He was able to demonstrate understanding of hobbies category. He took home interest inventory to aid in further set up.   Ongoing   LTG by:        Patient will be able to verbally and or in writing express basic wants and needs in home environment Naming continues to slowly improve. He  had difficulty with objects today.  Ongoing   Patient will comprehend basic spoken and written information presented in home environment Able  follow simple directions in context of therapy today.  Ongoing     12509 Speech/Language/Cognitive Treatment, [address]999 Therapeutic services for use of speech-generating device, including programming and modification  Total Time of Visit:            45  mins         ASSESSMENT/PLAN     ASSESSMENT: Patient  continues to make good progress.     PLAN: Continue therapy. Work on setting up SGD items at home. Bring SGD to therapy for instruction and practice. Return with interest inventory.     Progress Note Due Date: 11/14/2024  Recertification Due Date: 12/14/2024        Signature:  Zabrina Bhat, CCC-SLP, [place] License #: [id number]  Electronically signed on 10/21/2024          [address] [place] [address]  [place] [address]  [phone]

## 2024-10-21 NOTE — THERAPY TREATMENT NOTE
Occupational Therapy 90 Day Recertification Note  [facility]  A department [address], [place] [address]    Patient: Justin Londono                                                 Visit Date: 10/21/2024  :     1955    Referring practitioner:    DOROTA Mckee  Date of Initial Visit:          Type: THERAPY  Episode: CVA/R-sided weakness  Number of visits this episode: 12    Visit Diagnoses:    ICD-10-CM ICD-9-CM   1. Cerebrovascular accident (CVA), unspecified mechanism  I63.9 434.91   2. Right sided weakness  R53.1 728.87     SUBJECTIVE     Subjective:  Pt reports he has been working outside a lot over the past 2 weeks. Pt reports he no longer has   Concerns with either of his hands or arms. Pt reports he has resumed participation in all his ADL  And IADLs, and is only limited by his R knee.     PAIN: 0/10    Outcome Measures:   9 Hole Peg Test: Left: 27.95s  Right: 30.43s    PT G-Codes  Outcome Measure Options: Quick DASH  Quick DASH Score: 29.55    OBJECTIVE     Objective          Strength/Myotome Testing     Left Shoulder     Planes of Motion   Flexion: 5   Extension: 5   Abduction: 5   Adduction: 5     Right Shoulder     Planes of Motion   Flexion: 4   Extension: 4   Abduction: 4   Adduction: 4     Left Elbow   Flexion: 5  Extension: 5    Right Elbow   Flexion: 5  Extension: 5    Left Wrist/Hand   Wrist extension: 5  Wrist flexion: 5  Radial deviation: 5  Ulnar deviation: 5     (2nd hand position)     Trial 1: 36 lbs    Trial 2: 40 lbs    Trial 3: 42 lbs    Average: 39.33 lbs    Thumb Strength  Palmar/Three-Point Pinch     Trial 1: 15 lbs    Trial 2: 16 lbs    Trial 3: 16.5 lbs    Average: 15.83 lbs    Right Wrist/Hand   Wrist extension: 5  Wrist flexion: 5  Radial deviation: 5  Ulnar deviation: 5     (2nd hand position)     Trial 1: 55 lbs    Trial 2: 55 lbs    Trial 3: 56 lbs    Average: 55.33 lbs    Thumb Strength    Palmar/Three-Point Pinch     Trial 1: 14 lbs    Trial 2: 16 lbs    Trial 3: 16.5 lbs    Average: 15.5 lbs        Therapeutic Exercises    83439 Units Comments   MMT,  strength measurements and all outcome measures assessed for progress note. See details above.                          Timed Minutes 30     Therapy Education/Self Care 63377   Education offered today Discussed progress toward goals and plan to discharge today with pt and spouse.    Medbride Code    Ongoing HEP   Thera-putty exercises (Green Thera-Putty)   FM activities for B hands   Timed Minutes        Total Timed Treatment:     30   mins  Total Time of Visit:             35   mins         ASSESSMENT/PLAN     GOALS            Goals                                          Progress Note due by 11/21/24                                                      Recert due by 1/18/25   STG by: 11/21/24 Comments Date Status   Pt will be independent with daily completion of a HEP to address stroke deficits affecting IADL performance.  Pt non-compliant with HEP.  10/21/24 Not Met    Pt will be independent with simulated light-moderate IADL tasks demonstrating good safety techniques.  Pt demonstrated and reports independent and safe IADL performance.  10/21/24 Met    Pt will display improved B UE FMC by completing the 9 hole peg test in 20 seconds or less with R hand and 22 seconds or less with L hand.  Left: 27.95s    Right: 30.43s 10/21/24 Not Met            LTG by: 1/18/25       Pt will improve B hand  strength to 60 lbs with no report of pain.  Right: 55.33#   Left: 39.33#  10/21/24 Not Met    Pt will display appropriate motor coordination, visual scanning and reaction time for IADL performance by passing all 4 assessments on the [employer].  10/7/24: Trail Making Part A completed with the R UE. 4 errors, 9 interruptions and a time of 0:46. Part B completed with 16 errors, 13 interruptions and a time of 1:30.  Visual Scanning and Motor Reaction  Assessment completed with the B UE. Level 1 50/50 required targets. Level 2 53/40 required targets. Level 3 27/30 required targets.     9/9/24:   Bell Cancellation Test completed with the B UE in a time of 2:30 with 0 errors and 0 distractor hits.   Computerized Maze Assessment completed with the L UE in a time of 0:30 with 0 errors.     Pt was able to pass 3/4 assessments.  10/21/24 Partially Met    Pt will improve B three point pinch strength to 21 lbs with no report of pain.  Right: 15.5#  Left: 15.83#  10/21/24 Not Met    Pt will increase B UE strength to 5/5 for improved performance with IADL tasks and functional mobility. R shoulder 4/5  Remainder of B UE 5/5 10/21/24  Partially Met                           Anticipated CPT codes: Therapeutic Exercise 75037, Therapeutic Activity 37580, Neuromuscular ReEducation 84976, and Self Care/Home Management 80056    Assessment & Plan       Assessment  Assessment details: Pt has demonstrated progress toward multiple goals over the past 12 weeks. Demonstrating improvement in IADL performance, visual scanning and motor coordination skills, B UE strength, and R hand  strength. Pt has not made progress toward B FMC, B thumb strength, or L hand  strength. Pt was non-compliant with HEP throughout 12 weeks, but does perform FM tasks and strengthening activities through work. Pt believes his maximum functional potential has been achieved through OT and agrees with plan to d/c today.     Plan  Therapy options: will not be seen for skilled therapy services  Plan details: Discharge from OP OT services.            DISCHARGE SUMMARY   Discharge date 10/21/2024   Dates of this episode 7/22/24 through 10/21/24   Number of visits on this episode 12   Reason for discharge maximum functional potential achieved  lack of progress   Outcomes achieved Refer to the goals table for specifics on goals   Discharge plan Continue with current home exercise program as instructed   Summary of  care Pt treated for residual stroke deficits affecting IADL performance. Pt's IADL performance has improved and pt has demonstrated minimal progress toward strengthening goals in the past 8 weeks.    Discharge instruction Continue HEP as instructed        SIGNATURE: Cristela Bey OT, [place] License #: [id number]  Electronically Signed on 10/21/2024        [address] [place] [address]  [phone]

## 2024-10-23 ENCOUNTER — HOSPITAL ENCOUNTER (OUTPATIENT)
Facility: HOSPITAL | Age: 69
Setting detail: THERAPIES SERIES
Discharge: HOME | End: 2024-10-23
Payer: MEDICARE

## 2024-10-23 DIAGNOSIS — R47.01 APHASIA: ICD-10-CM

## 2024-10-23 DIAGNOSIS — R48.2 APRAXIA: Primary | ICD-10-CM

## 2024-10-23 PROCEDURE — 92609 USE OF SPEECH DEVICE SERVICE: CPT | Performed by: SPEECH-LANGUAGE PATHOLOGIST

## 2024-10-23 PROCEDURE — 92507 TX SP LANG VOICE COMM INDIV: CPT | Performed by: SPEECH-LANGUAGE PATHOLOGIST

## 2024-10-23 NOTE — PROGRESS NOTES
Speech Language Pathology Treatment Note  [address] [place] [address]    Patient: Justin Londono                                                                                     Visit Date: 10/23/2024  :     1955    Referring practitioner:    Maite Dodd PA-C  Date of Initial Visit:       2024          Visit Diagnoses:    ICD-10-CM ICD-9-CM   1. Apraxia  R48.2 784.69   2. Aphasia  R47.01 784.3       SUBJECTIVE     Patient arrived alert and ready for therapy today.     OBJECTIVE   GOALS    Goals                                         STG Comments Status   Patient will improve verbal expressive language skills by completing automatic speech tasks, naming objects/pictures, and completing open-ended phrases/sentences Patient continues to use social greetings and automatic responses. Naming addressed in context.   Patient stated that he has been using is SGD to practice his words.  Ongoing       Patient will improve comprehension of spoken language by following 1 step then two step directions presented verbally and/or in writing Patient followed verbal directions in context of utilizing SG device with multiple cues.    Ongoing   Patient will improve comprehension and expression of written language skills by complete functional reading and writing tasks Patient had some difficulty with typing words he could not say in the context of picture description task.  Ongoing   Patient will demonstrate understanding of and use AAC device to augment communication and express wants and needs Patient stated that he has been practicing with his SGD.  He was able to demonstrate searching for and choosing appropriate categories and sub categories using word lists on SGD. He was able to switch to keyboard and type when he knew how to spell the word. Programming buttons continues.   Ongoing   LTG by:        Patient will be able to verbally and  or in writing express basic wants and needs in home environment Naming continues to slowly improve. He is starting to use his SGD when unable to come up with a word or be able to say it.   Ongoing   Patient will comprehend basic spoken and written information presented in home environment Able  follow simple directions in context of therapy today.  Ongoing     00001 Speech/Language/Cognitive Treatment, 11862 Therapeutic services for use of speech-generating device, including programming and modification  Total Time of Visit:            45  mins         ASSESSMENT/PLAN     ASSESSMENT: Patient  continues to make good progress.     PLAN: Continue therapy. Work on setting up SGD items at home. Bring SGD to therapy for instruction and practice. Return with interest inventory.     Progress Note Due Date: 11/14/2024  Recertification Due Date: 12/14/2024        Signature:  Zabrina Bhat CCC-SLP, [place] License #: [id number]  Electronically signed on 10/23/2024          [place]. [address]  [phone]

## 2024-10-28 ENCOUNTER — APPOINTMENT (OUTPATIENT)
Dept: OCCUPATIONAL THERAPY | Facility: HOSPITAL | Age: 69
End: 2024-10-28
Payer: MEDICARE

## 2024-10-28 ENCOUNTER — APPOINTMENT (OUTPATIENT)
Facility: HOSPITAL | Age: 69
End: 2024-10-28
Payer: MEDICARE

## 2024-10-30 ENCOUNTER — APPOINTMENT (OUTPATIENT)
Facility: HOSPITAL | Age: 69
End: 2024-10-30
Payer: MEDICARE

## 2024-11-04 ENCOUNTER — HOSPITAL ENCOUNTER (OUTPATIENT)
Facility: HOSPITAL | Age: 69
Setting detail: THERAPIES SERIES
Discharge: HOME | End: 2024-11-04
Payer: MEDICARE

## 2024-11-04 ENCOUNTER — APPOINTMENT (OUTPATIENT)
Dept: OCCUPATIONAL THERAPY | Facility: HOSPITAL | Age: 69
End: 2024-11-04
Payer: MEDICARE

## 2024-11-04 DIAGNOSIS — R47.01 APHASIA: ICD-10-CM

## 2024-11-04 DIAGNOSIS — R48.2 APRAXIA: Primary | ICD-10-CM

## 2024-11-04 PROCEDURE — 92507 TX SP LANG VOICE COMM INDIV: CPT | Performed by: SPEECH-LANGUAGE PATHOLOGIST

## 2024-11-04 NOTE — PROGRESS NOTES
Speech Language Pathology Treatment Note  [address] [place] [address]    Patient: Justin Londono                                                                                     Visit Date: 2024  :     1955    Referring practitioner:    Maite Dodd PA-C  Date of Initial Visit:       2024          Visit Diagnoses:    ICD-10-CM ICD-9-CM   1. Apraxia  R48.2 784.69   2. Aphasia  R47.01 784.3       SUBJECTIVE     Patient arrived alert and ready for therapy today.     OBJECTIVE   GOALS    Goals                                         STG Comments Status   Patient will improve verbal expressive language skills by completing automatic speech tasks, naming objects/pictures, and completing open-ended phrases/sentences Patient continues to use social greetings and automatic responses. Naming addressed in context.   Patient stated that he has been using is SGD  Ongoing       Patient will improve comprehension of spoken language by following 1 step then two step directions presented verbally and/or in writing Patient had difficulty following two step directions involving adjectives.    Ongoing   Patient will improve comprehension and expression of written language skills by complete functional reading and writing tasks Patient had difficulty typing words from spoken word. He was able to identify spoken words from f/4 written words. He had difficulty completing sentences. Given following directions tasks, he did better with written than auditory.   Ongoing   Patient will demonstrate understanding of and use AAC device to augment communication and express wants and needs Previous: Patient stated that he has been practicing with his SGD.  He was able to demonstrate searching for and choosing appropriate categories and sub categories using word lists on SGD. He was able to switch to keyboard and type when he knew how to spell the  Patient is calling stating she is getting sunburn from the medication Hydrochlorothiazide 12.5 mg. . Patient stopped taking it on Saturday, Sunday and Monday. Patient states she has anxiety and wants to discuss that with the nurse. Patient also states she was put on a diuretic. Please call and advise.        Patient is aware their provider is out and would like the message to be addressed by the on call provider before their provider returns.     Thank you.    word. Programming buttons continues.   Ongoing   LTG by:        Patient will be able to verbally and or in writing express basic wants and needs in home environment Naming continues to slowly improve. He is starting to use his SGD when unable to come up with a word or be able to say it.   Ongoing   Patient will comprehend basic spoken and written information presented in home environment Able  follow simple directions in context of therapy today.  Ongoing     53213 Speech/Language/Cognitive Treatment, 22133 Therapeutic services for use of speech-generating device, including programming and modification  Total Time of Visit:            45  mins         ASSESSMENT/PLAN     ASSESSMENT: Patient  continues to make good progress.     PLAN: Continue therapy. Work on setting up SGD items at home. Bring SGD to therapy for instruction and practice. Return with interest inventory.     Progress Note Due Date: 11/14/2024  Recertification Due Date: 12/14/2024        Signature:  Zabrina Bhat CCC-SLP, [place] License #: [id number]  Electronically signed on 11/4/2024          [facility] [place]. [address]  [phone]

## 2024-11-06 ENCOUNTER — HOSPITAL ENCOUNTER (OUTPATIENT)
Facility: HOSPITAL | Age: 69
Setting detail: THERAPIES SERIES
Discharge: HOME | End: 2024-11-06
Payer: MEDICARE

## 2024-11-06 DIAGNOSIS — R48.2 APRAXIA: Primary | ICD-10-CM

## 2024-11-06 DIAGNOSIS — R47.01 APHASIA: ICD-10-CM

## 2024-11-06 PROCEDURE — 92507 TX SP LANG VOICE COMM INDIV: CPT | Performed by: SPEECH-LANGUAGE PATHOLOGIST

## 2024-11-06 NOTE — PROGRESS NOTES
Speech Language Pathology Treatment Note  [address] [place] [address]    Patient: Justin Londono                                                                                     Visit Date: 2024  :     1955    Referring practitioner:    Maite Dodd PA-C  Date of Initial Visit:       2024          Visit Diagnoses:    ICD-10-CM ICD-9-CM   1. Apraxia  R48.2 784.69   2. Aphasia  R47.01 784.3       SUBJECTIVE     Patient arrived alert and ready for therapy today.     OBJECTIVE   GOALS    Goals                                         STG Comments Status   Patient will improve verbal expressive language skills by completing automatic speech tasks, naming objects/pictures, and completing open-ended phrases/sentences Patient continues to use social greetings and automatic responses.  Patient demonstrated improved naming with carrier phrase (open the ___ window) but had more difficulty with words containing blends, multisyllabic words and words starting with /s/ and /h/.   Patient stated that he has been using is SGD but would like to be able to do more with it like practice his speech.  Ongoing       Patient will improve comprehension of spoken language by following 1 step then two step directions presented verbally and/or in writing Patient demonstrated understanding of questions in conversation.   Previous: Patient had difficulty following two step directions involving adjectives.    Ongoing   Patient will improve comprehension and expression of written language skills by complete functional reading and writing tasks Patient had difficulty typing words from spoken word. He was able to identify spoken words from f/4 written words. He had difficulty completing sentences. Given following directions tasks, he did better with written than auditory.   Ongoing   Patient will demonstrate understanding of and use AAC device to  augment communication and express wants and needs Patient was able to use a map on his SGD to answer a question about a location that he could not answer verbally.   Ongoing   LTG by:        Patient will be able to verbally and or in writing express basic wants and needs in home environment Naming continues to slowly improve. He is starting to use his SGD when unable to come up with a word or be able to say it.   Ongoing   Patient will comprehend basic spoken and written information presented in home environment Able  follow simple directions in context of therapy today.  Ongoing     34626 Speech/Language/Cognitive Treatment, 38999 Therapeutic services for use of speech-generating device, including programming and modification  Total Time of Visit:            45  mins         ASSESSMENT/PLAN     ASSESSMENT: Patient  continues to make good progress.     PLAN: Continue therapy. Work on setting up SGD items at home. Bring SGD to therapy for instruction and practice. Return with interest inventory.     Progress Note Due Date: 11/14/2024  Recertification Due Date: 12/14/2024        Signature:  Zabrina Bhat CCC-SLP, [place] License #: [id number]  Electronically signed on 11/6/2024          [place] [address]  [phone]

## 2024-11-11 ENCOUNTER — APPOINTMENT (OUTPATIENT)
Dept: OCCUPATIONAL THERAPY | Facility: HOSPITAL | Age: 69
End: 2024-11-11
Payer: MEDICARE

## 2024-11-13 ENCOUNTER — APPOINTMENT (OUTPATIENT)
Facility: HOSPITAL | Age: 69
End: 2024-11-13
Payer: MEDICARE

## 2024-11-18 ENCOUNTER — HOSPITAL ENCOUNTER (OUTPATIENT)
Facility: HOSPITAL | Age: 69
Setting detail: THERAPIES SERIES
Discharge: HOME | End: 2024-11-18
Payer: MEDICARE

## 2024-11-18 ENCOUNTER — APPOINTMENT (OUTPATIENT)
Dept: OCCUPATIONAL THERAPY | Facility: HOSPITAL | Age: 69
End: 2024-11-18
Payer: MEDICARE

## 2024-11-18 DIAGNOSIS — R48.2 APRAXIA: Primary | ICD-10-CM

## 2024-11-18 DIAGNOSIS — R47.01 APHASIA: ICD-10-CM

## 2024-11-18 PROCEDURE — 92507 TX SP LANG VOICE COMM INDIV: CPT | Performed by: SPEECH-LANGUAGE PATHOLOGIST

## 2024-11-18 NOTE — PROGRESS NOTES
Speech Language Pathology Treatment Note and 30 Day Progress Note  [address] [place] [address]    Patient: Justin Londono                                                                                     Visit Date: 2024  :     1955    Referring practitioner:    Maite Dodd PA-C  Date of Initial Visit:       2024          Visit Diagnoses:    ICD-10-CM ICD-9-CM   1. Apraxia  R48.2 784.69   2. Aphasia  R47.01 784.3       SUBJECTIVE     Patient arrived alert and ready for therapy today.     OBJECTIVE   GOALS    Goals                                         STG Comments Status   Patient will improve verbal expressive language skills by completing automatic speech tasks, naming objects/pictures, and completing open-ended phrases/sentences Patient continues to use social greetings and automatic responses. Able to repeat CV syllables. Continued difficulty with /t/ and /d/. He was able to repeat some two syllable CVCV words. (Eg apple, people, elbow)   Ongoing       Patient will improve comprehension of spoken language by following 1 step then two step directions presented verbally and/or in writing Patient demonstrated understanding of questions in conversation.   Previous: Patient had difficulty following two step directions involving adjectives.    Ongoing   Patient will improve comprehension and expression of written language skills by complete functional reading and writing tasks Patient had difficulty typing words from spoken word. He was able to identify spoken words from f/4 written words. He had difficulty completing sentences. Given following directions tasks, he did better with written than auditory.   Ongoing   Patient will demonstrate understanding of and use AAC device to augment communication and express wants and needs Patient did not bring SGD today.   Ongoing   LTG by:        Patient will be able to verbally  and or in writing express basic wants and needs in home environment Naming continues to slowly improve. He was able to repeat some two syllable words today with max cues.  Ongoing   Patient will comprehend basic spoken and written information presented in home environment Able  follow simple directions in context of therapy today.  Ongoing     12062 Speech/Language/Cognitive Treatment, 73558 Therapeutic services for use of speech-generating device, including programming and modification  Total Time of Visit:            45  mins         ASSESSMENT/PLAN     ASSESSMENT: Patient  continues to make good progress.     PLAN: Continue therapy. Work on setting up SGD items at home. Bring SGD to therapy for instruction and practice. Return with interest inventory.     Progress Note Due Date: 12/14/2024  Recertification Due Date: 12/14/2024        Signature:  Zabrina Bhat CCC-SLP, [place] License #: [id number]  Electronically signed on 11/18/2024          Dariusz Mancuso  [place] [address]  [phone]

## 2024-11-25 ENCOUNTER — APPOINTMENT (OUTPATIENT)
Dept: OCCUPATIONAL THERAPY | Facility: HOSPITAL | Age: 69
End: 2024-11-25
Payer: MEDICARE

## 2024-11-26 ENCOUNTER — HOSPITAL ENCOUNTER (OUTPATIENT)
Facility: HOSPITAL | Age: 69
Setting detail: THERAPIES SERIES
Discharge: HOME | End: 2024-11-26
Payer: MEDICARE

## 2024-11-26 DIAGNOSIS — R48.2 APRAXIA: Primary | ICD-10-CM

## 2024-11-26 DIAGNOSIS — R47.01 APHASIA: ICD-10-CM

## 2024-11-26 PROCEDURE — 92507 TX SP LANG VOICE COMM INDIV: CPT | Performed by: SPEECH-LANGUAGE PATHOLOGIST

## 2024-11-26 NOTE — PROGRESS NOTES
Speech Language Pathology Treatment Note  [address] [place] [address]    Patient: Justin Londono                                                                                     Visit Date: 2024  :     1955    Referring practitioner:    Maite Dodd PA-C  Date of Initial Visit:       2024          Visit Diagnoses:    ICD-10-CM ICD-9-CM   1. Apraxia  R48.2 784.69   2. Aphasia  R47.01 784.3       SUBJECTIVE     Patient arrived alert and ready for therapy today.     OBJECTIVE   GOALS    Goals                                         STG Comments Status   Patient will improve verbal expressive language skills by completing automatic speech tasks, naming objects/pictures, and completing open-ended phrases/sentences With given repetition steps (listen, tap, choral speaking, repetition, rate) Elias was able to repeat short words and phrases. He had more difficulty with longer words and phrases.    Ongoing       Patient will improve comprehension of spoken language by following 1 step then two step directions presented verbally and/or in writing Patient was able to follow one step directions involving location with repetition and cues.    Ongoing   Patient will improve comprehension and expression of written language skills by complete functional reading and writing tasks Patient was able to match written to spoken words. He had difficulty with spelling.   Ongoing   Patient will demonstrate understanding of and use AAC device to augment communication and express wants and needs Patient did not bring SGD today.  Ongoing   LTG by:        Patient will be able to verbally and or in writing express basic wants and needs in home environment Naming continues to slowly improve. He was able to repeat some two syllable words today with max cues.  Ongoing   Patient will comprehend basic spoken and written information presented in home  environment Able  follow simple directions in context of therapy today.  Ongoing     Therapy Education/Self Care    Details: POC   Given Home Exercise Program - [employer] apraxia carmen info   Progress: Reinforced   Education provided to:  Patient   Level of understanding Demonstrated           01462 Speech/Language/Cognitive Treatment, 69019 Therapeutic services for use of speech-generating device, including programming and modification  Total Time of Visit:            55  mins             ASSESSMENT/PLAN     ASSESSMENT: Patient  continues to make good progress.     PLAN: Continue therapy. Work on setting up SGD items at home. Bring SGD to therapy for instruction and practice.   Progress Note Due Date: 12/14/2024  Recertification Due Date: 12/14/2024        Signature:  Zabrina Bhat CCC-SLP, [place] License #: [id number]  Electronically signed on 11/26/2024          [address] [place] [address] [place] [address]  [phone]

## 2024-12-02 ENCOUNTER — HOSPITAL ENCOUNTER (OUTPATIENT)
Facility: HOSPITAL | Age: 69
Setting detail: THERAPIES SERIES
Discharge: HOME | End: 2024-12-02
Payer: MEDICARE

## 2024-12-02 ENCOUNTER — APPOINTMENT (OUTPATIENT)
Dept: OCCUPATIONAL THERAPY | Facility: HOSPITAL | Age: 69
End: 2024-12-02
Payer: MEDICARE

## 2024-12-02 PROCEDURE — 92507 TX SP LANG VOICE COMM INDIV: CPT | Performed by: SPEECH-LANGUAGE PATHOLOGIST

## 2024-12-02 NOTE — PROGRESS NOTES
Speech Language Pathology Treatment Note  [address] [place] [address]    Patient: Justin Londono                                                                                     Visit Date: 2024  :     1955    Referring practitioner:    Maite Dodd PA-C  Date of Initial Visit:       2024          Visit Diagnoses:  No diagnosis found.      SUBJECTIVE     Patient arrived alert and ready for therapy today.     OBJECTIVE   GOALS    Goals                                         STG Comments Status   Patient will improve verbal expressive language skills by completing automatic speech tasks, naming objects/pictures, and completing open-ended phrases/sentences With carrier phrase and verbal model, Justin was able to name 40/55 pictures.    Ongoing       Patient will improve comprehension of spoken language by following 1 step then two step directions presented verbally and/or in writing Patient was able to follow one step directions involving location with repetition and cues.    Ongoing   Patient will improve comprehension and expression of written language skills by complete functional reading and writing tasks Patient was able to match written to spoken words. He had difficulty with spelling.   Ongoing   Patient will demonstrate understanding of and use AAC device to augment communication and express wants and needs Patient did not bring SGD today. He stated that he is using it at home and it is helping him communicate with family.   Ongoing   LTG by:        Patient will be able to verbally and or in writing express basic wants and needs in home environment Naming continues to slowly improve. He was able to repeat some two syllable words today with max cues.  Ongoing   Patient will comprehend basic spoken and written information presented in home environment Able  follow simple directions in context of therapy today.   Ongoing     Therapy Education/Self Care    Details: POC   Given Home Exercise Program - [employer] apraxia carmen info   Progress: Reinforced   Education provided to:  Patient   Level of understanding Demonstrated           11531 Speech/Language/Cognitive Treatment, 36059 Therapeutic services for use of speech-generating device, including programming and modification  Total Time of Visit:            55  mins             ASSESSMENT/PLAN     ASSESSMENT: Patient  continues to make good progress.     PLAN: Continue therapy. Work on setting up SGD items at home. Bring SGD to therapy for instruction and practice.   Progress Note Due Date: 12/14/2024  Recertification Due Date: 12/14/2024        Signature:  Zabrina Bhat CCC-SLP, [place] License #: [id number]  Electronically signed on 12/2/2024          [facility] [place] [address]  [phone]

## 2024-12-04 ENCOUNTER — HOSPITAL ENCOUNTER (OUTPATIENT)
Facility: HOSPITAL | Age: 69
Setting detail: THERAPIES SERIES
Discharge: HOME | End: 2024-12-04
Payer: MEDICARE

## 2024-12-04 DIAGNOSIS — R48.2 APRAXIA: Primary | ICD-10-CM

## 2024-12-04 DIAGNOSIS — R47.01 APHASIA: ICD-10-CM

## 2024-12-04 PROCEDURE — 92507 TX SP LANG VOICE COMM INDIV: CPT | Performed by: SPEECH-LANGUAGE PATHOLOGIST

## 2024-12-04 NOTE — PROGRESS NOTES
Speech Language Pathology Treatment Note  [address] [place] [address]    Patient: Justin Londono                                                                                     Visit Date: 2024  :     1955    Referring practitioner:    Maite Dodd PA-C  Date of Initial Visit:       2024          Visit Diagnoses:    ICD-10-CM ICD-9-CM   1. Apraxia  R48.2 784.69   2. Aphasia  R47.01 784.3         SUBJECTIVE     Patient arrived alert and ready for therapy today.     OBJECTIVE   GOALS    Goals                                         STG Comments Status   Patient will improve verbal expressive language skills by completing automatic speech tasks, naming objects/pictures, and completing open-ended phrases/sentences Given visual and verbal model, the patient was able to repeat one syllable words with phonemes in all positions. He had more difficulty with two and three syllable words.    Ongoing       Patient will improve comprehension of spoken language by following 1 step then two step directions presented verbally and/or in writing Patient identified pictures to match verbal descriptions in f/4 with 72% accuracy.    Ongoing   Patient will improve comprehension and expression of written language skills by complete functional reading and writing tasks Previous: Patient was able to match written to spoken words. He had difficulty with spelling.   Ongoing   Patient will demonstrate understanding of and use AAC device to augment communication and express wants and needs Patient did not bring SGD today. He stated that he is using it at home and it is helping him communicate with family.   Ongoing   LTG by:        Patient will be able to verbally and or in writing express basic wants and needs in home environment Naming continues to slowly improve. He was able to repeat one syllable words with phonemes in all positions. He  repeated some two syllable words today with max cues.  Ongoing   Patient will comprehend basic spoken and written information presented in home environment 72% accuracy with identifying pictures by verbal description.  Ongoing     Therapy Education/Self Care    Details: POC   Given Home Exercise Program - [employer] apraxia carmen info   Progress: Reinforced   Education provided to:  Patient   Level of understanding Demonstrated           13629 Speech/Language/Cognitive Treatment,   Total Time of Visit:            40  mins             ASSESSMENT/PLAN     ASSESSMENT: Patient  continues to make good progress.     PLAN: Continue therapy. Work on setting up SGD items at home. Bring SGD to therapy for instruction and practice.   Progress Note Due Date: 12/14/2024  Recertification Due Date: 12/14/2024        Signature:  Zabrina Bhat CCC-SLP, [place] License #: [id number]  Electronically signed on 12/4/2024          [address] [place] [address] [place] [address]  [phone]

## 2024-12-09 ENCOUNTER — APPOINTMENT (OUTPATIENT)
Dept: OCCUPATIONAL THERAPY | Facility: HOSPITAL | Age: 69
End: 2024-12-09
Payer: MEDICARE

## 2024-12-09 ENCOUNTER — HOSPITAL ENCOUNTER (OUTPATIENT)
Facility: HOSPITAL | Age: 69
Setting detail: THERAPIES SERIES
Discharge: HOME | End: 2024-12-09
Payer: MEDICARE

## 2024-12-09 DIAGNOSIS — R48.2 APRAXIA: Primary | ICD-10-CM

## 2024-12-09 DIAGNOSIS — R47.01 APHASIA: ICD-10-CM

## 2024-12-09 NOTE — PROGRESS NOTES
Speech Language Pathology Treatment Note and 90 Day Recertification Note  [address] [place] [address]    Patient: Justin Londono                                                                                     Visit Date: 2024  :     1955    Referring practitioner:    Maite Dodd PA-C  Date of Initial Visit:       2024          Visit Diagnoses:    ICD-10-CM ICD-9-CM   1. Apraxia  R48.2 784.69   2. Aphasia  R47.01 784.3         SUBJECTIVE     Patient arrived alert and ready for therapy today.     OBJECTIVE   GOALS    Goals                                         STG Comments Status   Patient will improve verbal expressive language skills by completing automatic speech tasks, naming objects/pictures, and completing open-ended phrases/sentences Given visual and verbal model, the patient was able to repeat one syllable words with phonemes in all positions. He had more difficulty with two and three syllable words.    Ongoing       Patient will improve comprehension of spoken language by following 1 step then two step directions presented verbally and/or in writing Patient identified pictures to match verbal descriptions in f/4 with 72% accuracy.    Ongoing   Patient will improve comprehension and expression of written language skills by complete functional reading and writing tasks Previous: Patient was able to match written to spoken words. He had difficulty with spelling.   Ongoing   Patient will demonstrate understanding of and use AAC device to augment communication and express wants and needs Patient did not bring SGD today. He stated that he is using it at home and it is helping him communicate with family.   Ongoing   LTG by:        Patient will be able to verbally and or in writing express basic wants and needs in home environment Naming continues to slowly improve. He was able to repeat one syllable words with  phonemes in all positions. He repeated some two syllable words today with max cues.  Ongoing   Patient will comprehend basic spoken and written information presented in home environment 72% accuracy with identifying pictures by verbal description.  Ongoing     Therapy Education/Self Care    Details: POC   Given Home Exercise Program - [employer] apraxia carmen info   Progress: Reinforced   Education provided to:  Patient   Level of understanding Demonstrated           [address] Speech/Language/Cognitive Treatment,   Total Time of Visit:            40  mins             ASSESSMENT/PLAN     ASSESSMENT: Patient  continues to make good progress.     PLAN: Continue therapy. Work on setting up SGD items at home. Bring SGD to therapy for instruction and practice.   Progress Note Due Date: 1/9/2024  Recertification Due Date: 03/09/2025        Signature:  Zabrina Bhat CCC-SLP, [place] License #: [id number]  Electronically signed on 12/9/2024      90 Day Recertification  Certification Period: 12/10/2024 through 3/9/2025  I certify that the therapy services are furnished while this patient is under my care.  The services outlined above are required by this patient, and will be reviewed every 90 days.     PHYSICIAN: Maite Dodd PA-C (NPI: [id number])    Signature:___________________________________________DATE: _________    Please sign and return via fax to [phone].   Thank you so much for letting us work with Justin. I appreciate your letting us work with your patients. If you have any questions or concerns, please don't hesitate to contact me.                [address] [address]  [phone]

## 2024-12-11 ENCOUNTER — OFFICE VISIT (OUTPATIENT)
Dept: INTERNAL MEDICINE | Facility: CLINIC | Age: 69
End: 2024-12-11
Payer: MEDICARE

## 2024-12-11 ENCOUNTER — HOSPITAL ENCOUNTER (OUTPATIENT)
Dept: GENERAL RADIOLOGY | Facility: HOSPITAL | Age: 69
Discharge: HOME | End: 2024-12-11
Admitting: INTERNAL MEDICINE
Payer: MEDICARE

## 2024-12-11 ENCOUNTER — HOSPITAL ENCOUNTER (OUTPATIENT)
Facility: HOSPITAL | Age: 69
Setting detail: THERAPIES SERIES
Discharge: HOME | End: 2024-12-11
Payer: MEDICARE

## 2024-12-11 VITALS
DIASTOLIC BLOOD PRESSURE: 83 MMHG | BODY MASS INDEX: 34.05 KG/M2 | WEIGHT: 229.9 LBS | SYSTOLIC BLOOD PRESSURE: 149 MMHG | HEIGHT: 69 IN | HEART RATE: 55 BPM | RESPIRATION RATE: 16 BRPM | OXYGEN SATURATION: 98 %

## 2024-12-11 DIAGNOSIS — R48.2 APRAXIA: Primary | ICD-10-CM

## 2024-12-11 DIAGNOSIS — Z86.73 HISTORY OF STROKE: ICD-10-CM

## 2024-12-11 DIAGNOSIS — M17.11 PRIMARY OSTEOARTHRITIS OF RIGHT KNEE: ICD-10-CM

## 2024-12-11 DIAGNOSIS — E66.811 CLASS 1 OBESITY DUE TO EXCESS CALORIES WITH SERIOUS COMORBIDITY AND BODY MASS INDEX (BMI) OF 33.0 TO 33.9 IN ADULT: ICD-10-CM

## 2024-12-11 DIAGNOSIS — E66.09 CLASS 1 OBESITY DUE TO EXCESS CALORIES WITH SERIOUS COMORBIDITY AND BODY MASS INDEX (BMI) OF 33.0 TO 33.9 IN ADULT: ICD-10-CM

## 2024-12-11 DIAGNOSIS — I10 PRIMARY HYPERTENSION: Primary | ICD-10-CM

## 2024-12-11 DIAGNOSIS — R47.01 APHASIA: ICD-10-CM

## 2024-12-11 PROBLEM — Z86.0109 PERSONAL HISTORY OF OTHER COLON POLYPS: Status: ACTIVE | Noted: 2024-12-11

## 2024-12-11 PROBLEM — G47.33 OSA (OBSTRUCTIVE SLEEP APNEA): Status: ACTIVE | Noted: 2024-12-11

## 2024-12-11 PROCEDURE — G2211 COMPLEX E/M VISIT ADD ON: HCPCS | Performed by: INTERNAL MEDICINE

## 2024-12-11 PROCEDURE — 3077F SYST BP >= 140 MM HG: CPT | Performed by: INTERNAL MEDICINE

## 2024-12-11 PROCEDURE — 92507 TX SP LANG VOICE COMM INDIV: CPT | Performed by: SPEECH-LANGUAGE PATHOLOGIST

## 2024-12-11 PROCEDURE — 1160F RVW MEDS BY RX/DR IN RCRD: CPT | Performed by: INTERNAL MEDICINE

## 2024-12-11 PROCEDURE — 1159F MED LIST DOCD IN RCRD: CPT | Performed by: INTERNAL MEDICINE

## 2024-12-11 PROCEDURE — 3079F DIAST BP 80-89 MM HG: CPT | Performed by: INTERNAL MEDICINE

## 2024-12-11 PROCEDURE — 73562 X-RAY EXAM OF KNEE 3: CPT

## 2024-12-11 PROCEDURE — 99214 OFFICE O/P EST MOD 30 MIN: CPT | Performed by: INTERNAL MEDICINE

## 2024-12-11 PROCEDURE — 1126F AMNT PAIN NOTED NONE PRSNT: CPT | Performed by: INTERNAL MEDICINE

## 2024-12-11 RX ORDER — CLOPIDOGREL BISULFATE 75 MG/1
75 TABLET ORAL DAILY
Qty: 90 TABLET | Refills: 3 | Status: SHIPPED | OUTPATIENT
Start: 2024-12-11

## 2024-12-11 RX ORDER — LOSARTAN POTASSIUM 50 MG/1
50 TABLET ORAL DAILY
Qty: 90 TABLET | Refills: 1 | Status: SHIPPED | OUTPATIENT
Start: 2024-12-11

## 2024-12-11 NOTE — PROGRESS NOTES
Speech Language Pathology Treatment Note  [address] [place] [address]    Patient: Justin Londono                                                                                     Visit Date: 2024  :     1955    Referring practitioner:    Maite Dodd PA-C  Date of Initial Visit:       2024          Visit Diagnoses:    ICD-10-CM ICD-9-CM   1. Apraxia  R48.2 784.69   2. Aphasia  R47.01 784.3         SUBJECTIVE     Patient arrived alert and ready for therapy today.  He was somewhat discouraged from his MD visit this morning, stating that he does not like taking more medication. He was fully cooperative with therapy tasks today.     OBJECTIVE   GOALS    Goals                                         STG Comments Status   Patient will improve verbal expressive language skills by completing automatic speech tasks, naming objects/pictures, and completing open-ended phrases/sentences Given visual and verbal model, the patient was able to repeat one syllable words with phonemes in all positions. Difficulty increased with length of activity. Significant difficulty with /n/ phoneme and front/back (k/t).    Ongoing       Patient will improve comprehension of spoken language by following 1 step then two step directions presented verbally and/or in writing  Patient demonstrated understanding of conversation in context. Following directions not addressed today.  Ongoing   Patient will improve comprehension and expression of written language skills by complete functional reading and writing tasks Patient identified pictures to match verbal descriptions (short sentence) in f/4 with 100% accuracy today. He was able to choose the correct ending to a sentence in f/4 with 72% accuracy. Ongoing   Patient will demonstrate understanding of and use AAC device to augment communication and express wants and needs Patient did not bring SGD today.  He stated that he is using it at home and it is helping him communicate with family.   Ongoing   LTG by:        Patient will be able to verbally and or in writing express basic wants and needs in home environment Naming continues to slowly improve. He was able to repeat one syllable words with phonemes in all positions. He repeated some two syllable words today with max cues.  Ongoing   Patient will comprehend basic spoken and written information presented in home environment 100% accuracy with identifying pictures by written description.  Ongoing     Therapy Education/Self Care    Details: POC   Given Home Exercise Program - [employer] apraxia carmen info   Progress: Reinforced   Education provided to:  Patient   Level of understanding Demonstrated           [address] Speech/Language/Cognitive Treatment,   Total Time of Visit:            40  mins             ASSESSMENT/PLAN     ASSESSMENT: Patient  continues to make good progress.     PLAN: Continue therapy. Work on setting up SGD items at home. Bring SGD to therapy for instruction and practice.   Progress Note Due Date: 1/9/2024  Recertification Due Date: 03/09/2025        Signature:  Zabrina Bhat CCC-SLP, [place] License #: [id number]  Electronically signed on 12/11/2024            [place] [address]  [phone]

## 2024-12-11 NOTE — PROGRESS NOTES
CC: f/u hypertension and stroke    History:  Justin Londono is a 69 y.o. male   History of Present Illness  The patient presents for evaluation of right knee pain, elevated blood pressure, weight loss, and discuss overall health.    He reports persistent discomfort in his right knee. He has known arthritis and baker's cyst. The pain is localized to the knee itself, with no reported pain in the posterior aspect of the knee. He has not received any injections for his knee pain but expresses interest in exploring this treatment option. He has utilized Voltaren gel for his knee pain without much relief.     He has been experiencing a gradual weight loss, with an initial weight of 289 pounds at the time of his surgery. His current weight is 229 pounds, but the loss has been gradual. He maintains a good appetite and has not made any recent dietary modifications except a decrease in his portions.    He has a history of colon polyps and is scheduled for a colonoscopy. He will need to discontinue Plavix prior to the procedure.     He is currently undergoing speech therapy, which has resulted in significant improvement. He has a home blood pressure monitor and reports no chest pain or respiratory distress.        ROS:  Review of Systems   Constitutional:  Negative for chills and fever.   Respiratory:  Negative for cough and shortness of breath.    Cardiovascular:  Negative for chest pain and palpitations.   Gastrointestinal:  Negative for abdominal pain and constipation.   Musculoskeletal:  Positive for arthralgias. Negative for joint swelling.        reports that he quit smoking about 34 years ago. His smoking use included cigarettes. He started smoking about 44 years ago. He has a 2.5 pack-year smoking history. He has been exposed to tobacco smoke. He has never used smokeless tobacco. He reports current alcohol use. He reports that he does not currently use drugs.      Current Outpatient Medications:     aspirin 81 MG EC  "tablet, Take 1 tablet by mouth Daily., Disp: , Rfl:     atorvastatin (LIPITOR) 80 MG tablet, Take 1 tablet by mouth Every Night., Disp: 90 tablet, Rfl: 3    clopidogrel (PLAVIX) 75 MG tablet, Take 1 tablet by mouth Daily., Disp: 90 tablet, Rfl: 3    losartan (Cozaar) 50 MG tablet, Take 1 tablet by mouth Daily., Disp: 90 tablet, Rfl: 1    OBJECTIVE:  /83 (BP Location: Left arm, Patient Position: Sitting, Cuff Size: Adult)   Pulse 55   Resp 16   Ht 175.3 cm (69\")   Wt 104 kg (229 lb 14.4 oz)   SpO2 98%   BMI 33.95 kg/m²    Physical Exam  Constitutional:       General: He is not in acute distress.  Cardiovascular:      Rate and Rhythm: Normal rate and regular rhythm.      Heart sounds: Normal heart sounds. No murmur heard.  Pulmonary:      Effort: Pulmonary effort is normal.      Breath sounds: Normal breath sounds. No wheezing.   Musculoskeletal:      Comments: Prominence over right tibial tuberosity.  There is pain with valgus stress of the right leg, but none with varus stress.  Negative anterior and posterior drawer on the right.  There is no posterior tenderness on palpation.   Neurological:      Mental Status: He is alert and oriented to person, place, and time.      Gait: Gait normal.   Psychiatric:         Mood and Affect: Mood normal.         Behavior: Behavior normal.           Assessment/Plan     Diagnoses and all orders for this visit:    1. Primary hypertension (Primary)  -     losartan (Cozaar) 50 MG tablet; Take 1 tablet by mouth Daily.  Dispense: 90 tablet; Refill: 1  Poorly controlled, BP goal for age is <140/90 per JNC 8 guidelines, and add losartan.    2. Primary osteoarthritis of right knee  -     XR knee 3+ vw w sunrise right; Future  -     Ambulatory Referral to Sports Medicine  Previous x-ray at outside facility in 2019 showed tricompartmental arthritis.  We will update this imaging and refer to sports medicine for further evaluation.    3. History of stroke  -     clopidogrel " (PLAVIX) 75 MG tablet; Take 1 tablet by mouth Daily.  Dispense: 90 tablet; Refill: 3  He is on dual antiplatelet therapy and should remain so for a year from his stroke.  Continue statin at high intensity with LDL goal of less than 70.    4. Class 1 obesity due to excess calories with serious comorbidity and body mass index (BMI) of 33.0 to 33.9 in adult  Encouraged ongoing dietary modifications with steady, consistent weight loss.    An After Visit Summary was printed and given to the patient at discharge.  Return in about 3 months (around 3/11/2025) for Recheck.      Patient or patient representative verbalized consent for the use of Ambient Listening during the visit with  Shahid Dinero DO for chart documentation. 12/11/2024  11:11 IRIS Dinero D.O. 12/11/2024   Electronically signed.

## 2024-12-16 ENCOUNTER — APPOINTMENT (OUTPATIENT)
Dept: OCCUPATIONAL THERAPY | Facility: HOSPITAL | Age: 69
End: 2024-12-16
Payer: MEDICARE

## 2024-12-16 ENCOUNTER — APPOINTMENT (OUTPATIENT)
Facility: HOSPITAL | Age: 69
End: 2024-12-16
Payer: MEDICARE

## 2024-12-17 ENCOUNTER — APPOINTMENT (OUTPATIENT)
Facility: HOSPITAL | Age: 69
End: 2024-12-17
Payer: MEDICARE

## 2024-12-18 ENCOUNTER — APPOINTMENT (OUTPATIENT)
Facility: HOSPITAL | Age: 69
End: 2024-12-18
Payer: MEDICARE

## 2024-12-23 ENCOUNTER — APPOINTMENT (OUTPATIENT)
Dept: OCCUPATIONAL THERAPY | Facility: HOSPITAL | Age: 69
End: 2024-12-23
Payer: MEDICARE

## 2024-12-27 ENCOUNTER — HOSPITAL ENCOUNTER (OUTPATIENT)
Dept: GENERAL RADIOLOGY | Facility: HOSPITAL | Age: 69
Discharge: HOME | End: 2024-12-27
Payer: MEDICARE

## 2024-12-27 ENCOUNTER — OFFICE VISIT (OUTPATIENT)
Age: 69
End: 2024-12-27
Payer: MEDICARE

## 2024-12-27 VITALS — WEIGHT: 229 LBS | HEIGHT: 69 IN | BODY MASS INDEX: 33.92 KG/M2

## 2024-12-27 DIAGNOSIS — R47.01 EXPRESSIVE APHASIA: ICD-10-CM

## 2024-12-27 DIAGNOSIS — M17.11 PRIMARY OSTEOARTHRITIS OF RIGHT KNEE: Primary | ICD-10-CM

## 2024-12-27 DIAGNOSIS — M48.02 SPINAL STENOSIS IN CERVICAL REGION: ICD-10-CM

## 2024-12-27 DIAGNOSIS — M50.30 DDD (DEGENERATIVE DISC DISEASE), CERVICAL: ICD-10-CM

## 2024-12-27 PROCEDURE — 72050 X-RAY EXAM NECK SPINE 4/5VWS: CPT

## 2024-12-27 RX ORDER — TRIAMCINOLONE ACETONIDE 40 MG/ML
40 INJECTION, SUSPENSION INTRA-ARTICULAR; INTRAMUSCULAR ONCE
Status: COMPLETED | OUTPATIENT
Start: 2024-12-27 | End: 2024-12-27

## 2024-12-27 RX ORDER — LIDOCAINE HYDROCHLORIDE 10 MG/ML
2 INJECTION, SOLUTION INFILTRATION; PERINEURAL ONCE
Status: COMPLETED | OUTPATIENT
Start: 2024-12-27 | End: 2024-12-27

## 2024-12-27 RX ADMIN — LIDOCAINE HYDROCHLORIDE 2 ML: 10 INJECTION, SOLUTION INFILTRATION; PERINEURAL at 09:52

## 2024-12-27 RX ADMIN — TRIAMCINOLONE ACETONIDE 40 MG: 40 INJECTION, SUSPENSION INTRA-ARTICULAR; INTRAMUSCULAR at 09:52

## 2024-12-27 NOTE — PROGRESS NOTES
[facility]  Pedro Oquendo MD, PhD  Wojciech Oquendo PA-C    CHIEF COMPLAINT  Initial Evaluation of the Right Knee (Patient presents today for right knee pain. X-ray performed at  on 12/11/24. Patient states knee pain started about 6 years ago and is progressively getting worse overtime. No injury. Patient has had injections in the knee in the past. Last injection was about 4 years ago.)       HISTORY OF PRESENT ILLNESS    History of Present Illness  The patient presents for evaluation of right knee pain.    He has a known history of arthritis in the right knee, diagnosed several years ago. He reports persistent redness in the knee for an extended period but does not experience any pain upon palpation or in the posterior aspect of the knee. He received injections in [place], [place] approximately 4 to 5 years ago, which unfortunately did not provide any relief. He also tried Voltaren gel and topical diclofenac years ago, but these treatments were ineffective.    His vitamin D level was low 5 months ago. He is not currently taking a vitamin D supplement.    Supplemental Information  He is on aspirin, Plavix, cholesterol medicine, and a blood pressure medication.    MEDICATIONS  Aspirin, Plavix       HISTORY    Current Outpatient Medications   Medication Instructions    aspirin 81 MG EC tablet 1 tablet, Daily    atorvastatin (LIPITOR) 80 mg, Oral, Nightly    clopidogrel (PLAVIX) 75 mg, Oral, Daily    losartan (COZAAR) 50 mg, Oral, Daily         reports that he quit smoking about 35 years ago. His smoking use included cigarettes. He started smoking about 45 years ago. He has a 2.5 pack-year smoking history. He has been exposed to tobacco smoke. He has never used smokeless tobacco. He reports current alcohol use. He reports that he does not currently use drugs.    Past Medical History:   Diagnosis Date    Bleeding tendency     Hyperlipidemia     Stroke         Past Surgical  "History:   Procedure Laterality Date    CERVICAL DISC ARTHROPLASTY      2/01/2024    COLONOSCOPY      2014?  polyps    THROMBECTOMY  02/01/2024    \"clot removed from brain\"    TOTAL HIP ARTHROPLASTY      2022        PHYSICAL EXAM  Constitutional: The patient is in no apparent distress and generally well-appearing. The patient hears me clearly and answers questions appropriately.   Musculoskeletal:  Physical Exam  KNEE: right  antalgic gait   No effusion   Chronic venous stasis dermatitis changes from mid-shin down to ankle  No scars, no overlying skin abnormalities   No redness, warmth, or signs of infection   No distension of prepatellar or infrapatellar bursa   tender medial joint line   Nontender lateral joint line  +crepitus   Normal patella position.   Lower leg compartments soft, non-erythematous, with no signs of DVT or compartment syndrome        IMAGING    XR Knee 3+ View With [place] Right    Result Date: 12/11/2024  Narrative: EXAMINATION: XR KNEE 3+ VW W SUNRISE RIGHT-  12/11/2024 10:33 AM  HISTORY: chronic arthritis; M17.11-Unilateral primary osteoarthritis, right knee  4 view RIGHT knee exam.  Moderate degenerative narrowing of the medial tibiofemoral compartment. Mild degenerative spurring of the patella.  No significant joint effusion.  No fracture or dislocation.      Impression: 1. Osteoarthritic changes of the anterior and medial compartment.    This report was signed and finalized on 12/11/2024 2:58 PM by Dr. Keenan Howell MD.        Results  Laboratory Studies  Hemoglobin A1c was 6.1% in April. Vitamin D level was 26.3 five months ago. Thyroid function is normal.    Imaging  X-rays of the knee show moderate arthritis in multiple areas, with the inside part of the knee showing significant narrowing and the outside part still having a good amount of space. The area behind the kneecap shows a few rough spots.       ASSESSMENT & PLAN  Diagnoses and all orders for this visit:    1. Primary " osteoarthritis of right knee (Primary)  -     lidocaine (XYLOCAINE) 1 % injection 2 mL  -     triamcinolone acetonide (KENALOG-40) injection 40 mg    2. Expressive aphasia         Assessment & Plan  1. Right knee pain.  The patient has known arthritis in the right knee, confirmed by recent x-rays showing moderate arthritis with narrowing of the joint space and rough areas behind the kneecap. Previous injections were not effective. I assume these were steroid injections but may have been visco, or potentially both. It was difficult for him to communicate details due to his expressive aphasia from prior stroke. They were done years ago in [place]. Treatment options discussed include cortisone injections, gel injections, topical anti-inflammatory medications, knee braces, and physical therapy. He opted for a cortisone injection today. If the cortisone injection does not provide relief, other options will be considered.    2. Vitamin D insufficiency.  His vitamin D level was low 5 months ago, measured at 26.3. He is advised to take an over-the-counter vitamin D3 supplement at a dosage of 1000 IU daily.    PROCEDURE  A cortisone injection was administered into the right knee today.    Treatment of knee osteoarthritis:  1,NSAIDs if no contraindications   a. Consider addition of PPI if increased risk of Gl side effects   2. Topical NSAID (e.g. diclofenac) +/- capsaicin   a. 4g diclofenac 1% gel 3-4 times daily   3. Exercise   4. Physical therapy for strengthening around joint, improving balance and gait, and to optimize effectiveness of exercise program   5. Weight management - goal of 5-10% body weight loss   a. comorbidity Drug therapy (e.g. Ozempic) considered for BMI >30 or BMI 27-29.9 w/ weight-related   b. Bariatric surgery candidates = BMI >40 or BMI 35-39.9 w/ at least 1 serious comorbidity, who have not met weight loss goals otherwise   6. Bracing  7. Intraarticular injection of steroid for short-term pain relief  "  8. Oral duloxetine - 30mg daily for 1 week, then 60mg once daily   9. Joint replacement surgery     Other options lacking efficacy or of uncertain benefit:   1.Glucosamine/chondroitin   2. Acetaminophen   3.Curcumin   4.Intraarticular HLA   5.Intraarticular PRP   6.TENS   7.Acupuncture      Right knee injection today  Topical OTC analgesics PRN  Follow up: 4 weeks     Knee Injection Procedure Note    Right knee injection was discussed with the patient in detail, including indication, risks, benefits, and alternatives.  Risks include but are not limited to: incomplete symptom resolution, injection site pain, local irritation, bleeding, infection, allergic reaction, elevated blood pressure and blood sugar.  Verbal consent was given for the procedure.  Injection site was identified by physical examination and cleaned with Chloraprep and alcohol swabs. Prior to needle insertion, ethyl chloride spray was used for surface anesthesia.  A 22-gauge, 1.5\" needle was directed to the joint from a(n) lateral and inferior approach. Injectate was passed into the joint space without difficulty. The needle was removed and a simple bandage was applied. The procedure was tolerated well without difficulty.    Injection mixture:  1% plain lidocaine: 2 mL  40 mg/mL triamcinolone acetonide: 1 mL            Patient or patient representative verbalized consent for the use of Ambient Listening during the visit with  Pedro Oquendo MD for chart documentation. 12/27/2024  10:38 CST    Pedro Oquendo MD, PhD  "

## 2024-12-30 ENCOUNTER — HOSPITAL ENCOUNTER (OUTPATIENT)
Facility: HOSPITAL | Age: 69
Setting detail: THERAPIES SERIES
Discharge: HOME | End: 2024-12-30
Payer: MEDICARE

## 2024-12-30 ENCOUNTER — APPOINTMENT (OUTPATIENT)
Dept: OCCUPATIONAL THERAPY | Facility: HOSPITAL | Age: 69
End: 2024-12-30
Payer: MEDICARE

## 2024-12-30 ENCOUNTER — PATIENT ROUNDING (BHMG ONLY) (OUTPATIENT)
Age: 69
End: 2024-12-30
Payer: MEDICARE

## 2024-12-30 DIAGNOSIS — R48.2 APRAXIA: Primary | ICD-10-CM

## 2024-12-30 DIAGNOSIS — R47.01 APHASIA: ICD-10-CM

## 2024-12-30 PROCEDURE — 92507 TX SP LANG VOICE COMM INDIV: CPT | Performed by: SPEECH-LANGUAGE PATHOLOGIST

## 2024-12-30 NOTE — PROGRESS NOTES
Speech Language Pathology Treatment Note  [address] [place] [address]    Patient: Justin Londono                                                                                     Visit Date: 2024  :     1955    Referring practitioner:    Maite Dodd PA-C  Date of Initial Visit:       2024          Visit Diagnoses:    ICD-10-CM ICD-9-CM   1. Apraxia  R48.2 784.69   2. Aphasia  R47.01 784.3         SUBJECTIVE     Patient arrived alert and ready for therapy today.    Pain: No pain noted.   OBJECTIVE   GOALS    Goals                                         STG Comments Status   Patient will improve verbal expressive language skills by completing automatic speech tasks, naming objects/pictures, and completing open-ended phrases/sentences Patient was able to name one syllable pictures x24 without model, 15 with model, and 10 he was unable to pronounce with model. (79% with/without model).    Ongoing       Patient will improve comprehension of spoken language by following 1 step then two step directions presented verbally and/or in writing  Patient demonstrated understanding of conversation in context. Following directions not addressed today.  Ongoing   Patient will improve comprehension and expression of written language skills by complete functional reading and writing tasks Not directly addressed today  Ongoing   Patient will demonstrate understanding of and use AAC device to augment communication and express wants and needs Patient brought SGD today. He has been using it more, especially when communicating his order at a restaurant. Patient was given instruction on programming correct pronunciation variants and changing picture icons using web search. Patient demonstrated understanding.   Ongoing   LTG by:        Patient will be able to verbally and or in writing express basic wants and needs in home environment Naming  continues to slowly improve. He was able to repeat one syllable words with phonemes in all positions. He repeated some two syllable words today with max cues. He is making progress with using his SGD.  Ongoing   Patient will comprehend basic spoken and written information presented in home environment Previous: 100% accuracy with identifying pictures by written description.  Ongoing     Therapy Education/Self Care    Details: POC   Given Home Exercise Program - [employer] apraxia carmen info   Progress: Reinforced   Education provided to:  Patient   Level of understanding Demonstrated           [address] Speech/Language/Cognitive Treatment,   Total Time of Visit:            40  mins             ASSESSMENT/PLAN     ASSESSMENT: Patient  continues to make good progress.     PLAN: Continue therapy. Work on setting up SGD items at home. Bring SGD to therapy for instruction and practice.   Progress Note Due Date: 1/9/2024  Recertification Due Date: 03/09/2025        Signature:  Zabrina Bhat CCC-SLP, [place] License #: [id number]  Electronically signed on 12/30/2024            [facility]  [place] [address]  [phone]

## 2024-12-30 NOTE — PROGRESS NOTES
December 30, 2024    Hello, may I speak with Justin Londono?    My name is THIERNO.      I am  with [facility]  [address] [place] [id number] [phone].    Before we get started may I verify your date of birth? 1955    I am calling to officially welcome you to our practice and ask about your recent visit. Is this a good time to talk?     NO ANSWER      Tell me about your visit with us. What things went well?         We're always looking for ways to make our patients' experiences even better. Do you have recommendations on ways we may improve?      Overall were you satisfied with your first visit to our practice?        I appreciate you taking the time to speak with me today. Is there anything else I can do for you?       Thank you, and have a great day.

## 2025-01-06 ENCOUNTER — APPOINTMENT (OUTPATIENT)
Facility: HOSPITAL | Age: 70
End: 2025-01-06
Payer: MEDICARE

## 2025-01-13 ENCOUNTER — HOSPITAL ENCOUNTER (OUTPATIENT)
Facility: HOSPITAL | Age: 70
Setting detail: THERAPIES SERIES
Discharge: HOME | End: 2025-01-13
Payer: MEDICARE

## 2025-01-13 DIAGNOSIS — R47.01 APHASIA: ICD-10-CM

## 2025-01-13 DIAGNOSIS — R48.2 APRAXIA: Primary | ICD-10-CM

## 2025-01-13 PROCEDURE — 92507 TX SP LANG VOICE COMM INDIV: CPT | Performed by: SPEECH-LANGUAGE PATHOLOGIST

## 2025-01-13 NOTE — PROGRESS NOTES
Speech Language Pathology Treatment Note and 30 Day Progress Note  [address] [place] [address]    Patient: Justin Londono                                                                                     Visit Date: 2025  :     1955    Referring practitioner:    Maite Dodd PA-C  Date of Initial Visit:       2024          Visit Diagnoses:    ICD-10-CM ICD-9-CM   1. Apraxia  R48.2 784.69   2. Aphasia  R47.01 784.3         SUBJECTIVE     Patient arrived alert and ready for therapy today.    Pain: No pain noted.   OBJECTIVE   GOALS    Goals                                         STG Comments Status   Patient will improve verbal expressive language skills by completing automatic speech tasks, naming objects/pictures, and completing open-ended phrases/sentences Patient was able to name one syllable pictures x15 without model, 10 with model, and 8 he was unable to pronounce with model. (75% with/without model).    Ongoing       Patient will improve comprehension of spoken language by following 1 step then two step directions presented verbally and/or in writing  Patient demonstrated understanding of conversation in context. Following directions not addressed today.  Ongoing   Patient will improve comprehension and expression of written language skills by complete functional reading and writing tasks Not directly addressed today  Ongoing   Patient will demonstrate understanding of and use AAC device to augment communication and express wants and needs Patient is using SGD at home to aid in communication. He did not bring it to therapy today.   Ongoing   LTG by:        Patient will be able to verbally and or in writing express basic wants and needs in home environment Naming continues to slowly improve. He was able to repeat one syllable words with phonemes in all positions. He repeated some two syllable words today with max  cues. He is making progress with using his SGD.  Ongoing   Patient will comprehend basic spoken and written information presented in home environment Previous: 100% accuracy with identifying pictures by written description.  Ongoing     Therapy Education/Self Care    Details: POC   Given Home Exercise Program - [employer] apraxia carmen info   Progress: Reinforced   Education provided to:  Patient   Level of understanding Demonstrated           [address] Speech/Language/Cognitive Treatment,   Total Time of Visit:            40  mins             ASSESSMENT/PLAN     ASSESSMENT: Patient  continues to make good progress.     PLAN: Continue therapy. Work on setting up SGD items at home. Bring SGD to therapy for instruction and practice.   Progress Note Due Date: 2/9/2024  Recertification Due Date: 03/09/2025        Signature:  Zabrina Bhat CCC-SLP, [place] License #: [id number]  Electronically signed on 1/13/2025            [place]. [id number]  [phone]

## 2025-01-15 ENCOUNTER — OFFICE VISIT (OUTPATIENT)
Dept: NEUROSURGERY | Facility: CLINIC | Age: 70
End: 2025-01-15
Payer: MEDICARE

## 2025-01-15 VITALS — BODY MASS INDEX: 33.92 KG/M2 | WEIGHT: 229 LBS | HEIGHT: 69 IN

## 2025-01-15 DIAGNOSIS — R47.01 EXPRESSIVE APHASIA: Primary | ICD-10-CM

## 2025-01-15 DIAGNOSIS — M50.30 DDD (DEGENERATIVE DISC DISEASE), CERVICAL: ICD-10-CM

## 2025-01-15 NOTE — PROGRESS NOTES
"    Chief complaint:   Chief Complaint   Patient presents with    Follow-up     Follow up numbness and tingling right hand and right shoulder        Subjective     HPI: I had a chance to see Elias today in follow-up.  He is doing very well and his symptoms from his stroke are slowly improving.  He does not have any neck pain at this time and therefore I think that we can continue to watch him and hopefully some of the symptoms from the stroke will continue to improve.    Review of Systems      Objective      Vital Signs  Ht 175.3 cm (69\")   Wt 104 kg (229 lb)   BMI 33.82 kg/m²     Physical Exam    Neurological Exam alert and oriented x 3, moving all extremities without deficit, expressive speech deficit    Imaging review:   EXAMINATION: XR SPINE CERVICAL AP AND LAT W FLEX AND EXT- 12/27/2024  10:26 AM     HISTORY: Cervical stenosis; M50.30-Other cervical disc degeneration,  unspecified cervical region; M48.02-Spinal stenosis, cervical region.     REPORT: 5 views of the cervical spine were obtained, including flexion  and extension views.     COMPARISON: Cervical spine x-rays 6/17/2024.     The neutral lateral view demonstrates normal vertebral body alignment  without spondylolisthesis. Previous ACDF is noted at C3-C6. No hardware  loosening or evidence of pseudoarthrosis is identified. There is no  fracture. The prevertebral soft tissues are within normal limits. No  evidence of dynamic instability is identified with flexion and  extension.     IMPRESSION:  No acute abnormality, previous ACDF C3-C6 without hardware  failure or pseudoarthrosis. No spondylolisthesis or evidence of dynamic  instability with flexion and extension.        Assessment/Plan:   Status post anterior cervical discectomy and fusion at another facility  Status post CVA    Elias is doing quite well at this time and is recovering and his neck is feeling quite well and his x-rays show that all of his instrumentation is intact.  I would like " to follow-up with him in 3 months to check on his progress.  I look forward to seeing him at his next visit.    Patient is a nonsmoker  The patient's Body mass index is 33.82 kg/m².. BMI is above normal parameters. Recommendations include: continue with current weight loss program    There are no diagnoses linked to this encounter.    I discussed the patients findings and my recommendations with patient  Raul PEDRITO Salvador DO  01/15/25  12:57 CST

## 2025-01-20 ENCOUNTER — HOSPITAL ENCOUNTER (OUTPATIENT)
Facility: HOSPITAL | Age: 70
Setting detail: THERAPIES SERIES
Discharge: HOME | End: 2025-01-20
Payer: MEDICARE

## 2025-01-20 DIAGNOSIS — R48.2 APRAXIA: Primary | ICD-10-CM

## 2025-01-20 DIAGNOSIS — R47.01 APHASIA: ICD-10-CM

## 2025-01-20 PROCEDURE — 92507 TX SP LANG VOICE COMM INDIV: CPT | Performed by: SPEECH-LANGUAGE PATHOLOGIST

## 2025-01-20 NOTE — PROGRESS NOTES
Speech Language Pathology Treatment Note  [address] [place] [address]    Patient: Justin Londono                                                                                     Visit Date: 2025  :     1955    Referring practitioner:    Maite Dodd PA-C  Date of Initial Visit:       2024          Visit Diagnoses:    ICD-10-CM ICD-9-CM   1. Apraxia  R48.2 784.69   2. Aphasia  R47.01 784.3         SUBJECTIVE     Patient arrived alert and ready for therapy today.    Pain: No pain noted.   OBJECTIVE   GOALS    Goals                                         STG Comments Status   Patient will improve verbal expressive language skills by completing automatic speech tasks, naming objects/pictures, and completing open-ended phrases/sentences Last session: Patient was able to name one syllable pictures x15 without model, 10 with model, and 8 he was unable to pronounce with model. (75% with/without model).    Ongoing       Patient will improve comprehension of spoken language by following 1 step then two step directions presented verbally and/or in writing  Patient demonstrated understanding of conversation in context. He exhibited difficulty following directions for tasks today.  Ongoing   Patient will improve comprehension and expression of written language skills by complete functional reading and writing tasks Patient was able to spell single words heard (CT level 2) with 75% accuracy. He spelled words to match pictures (L2) with 77% accuracy.  He was able to identify written word he heard with 80% with repetition. He was able to do simple (level 1) clock reading with 90% accuracy (f/3) but had more difficulty with level 2 clock reading (80%). He had significant difficulty with calendar reading.  Ongoing   Patient will demonstrate understanding of and use AAC device to augment communication and express wants and needs Patient  is using SGD at home to aid in communication. He did not bring it to therapy today.     Ongoing   LTG by:        Patient will be able to verbally and or in writing express basic wants and needs in home environment Naming continues to slowly improve. He had difficulty with spelling level 2 words on CT carmen.  Ongoing   Patient will comprehend basic spoken and written information presented in home environment Patient identified written word to spoken word (f/4) with 80% accuracy.  Ongoing     Therapy Education/Self Care    Details: POC   Given Home Exercise Program   Progress: Reinforced   Education provided to:  Patient   Level of understanding Demonstrated           [address] Speech/Language/Cognitive Treatment,   Total Time of Visit:            40  mins             ASSESSMENT/PLAN     ASSESSMENT: Patient  continues to make good progress.     PLAN: Continue therapy. Work on setting up SGD items at home. Bring SGD to therapy for instruction and practice.   Progress Note Due Date: 2/9/2024  Recertification Due Date: 03/09/2025        Signature:  Zabrina Bhat CCC-SLP, [place] License #: [id number]  Electronically signed on 1/20/2025            [facility] [place] [address]

## 2025-01-27 ENCOUNTER — HOSPITAL ENCOUNTER (OUTPATIENT)
Facility: HOSPITAL | Age: 70
Setting detail: THERAPIES SERIES
Discharge: HOME | End: 2025-01-27
Payer: MEDICARE

## 2025-01-27 DIAGNOSIS — R48.2 APRAXIA: Primary | ICD-10-CM

## 2025-01-27 DIAGNOSIS — R47.01 APHASIA: ICD-10-CM

## 2025-01-27 PROCEDURE — 92507 TX SP LANG VOICE COMM INDIV: CPT | Performed by: SPEECH-LANGUAGE PATHOLOGIST

## 2025-01-27 NOTE — PROGRESS NOTES
"                                                                     Speech Language Pathology Treatment Note  [address] [place] [address]    Patient: Justin Londono                                                                                     Visit Date: 2025  :     1955    Referring practitioner:    Maite Dodd PA-C  Date of Initial Visit:       2024          Visit Diagnoses:    ICD-10-CM ICD-9-CM   1. Apraxia  R48.2 784.69   2. Aphasia  R47.01 784.3         SUBJECTIVE     Patient arrived alert and ready for therapy today.    Pain: No pain noted.   OBJECTIVE   GOALS    Goals                                         STG Comments Status   Patient will improve verbal expressive language skills by completing automatic speech tasks, naming objects/pictures, and completing open-ended phrases/sentences Patient was able to name some pictures in the context of writing tasks. With picture scene description, he was able to state one fairly complete sentence \"boy is working\" and phrases/partial sentences \"the man is clean(ing), she's holding, new shoes, two boys\", etc. He provided 4-6 correct information units per picture scene with 1-2 incorrect words as well as unintelligible jargon per picture. He used pointing and a few relevant gestures   Ongoing       Patient will improve comprehension of spoken language by following 1 step then two step directions presented verbally and/or in writing  Patient demonstrated understanding of conversation in context.  Ongoing   Patient will improve comprehension and expression of written language skills by complete functional reading and writing tasks Patient was able to write simple words given object pictures (3-5 letter words) with 70% accuracy. He was able to notice his misspelled words about 1/2 the time. When unable to repair or initiate he was given the written word to copy.  Ongoing   Patient will demonstrate understanding of and use AAC device " to augment communication and express wants and needs Patient is using SGD at home to aid in communication. He did not bring it to therapy today.     Ongoing   LTG by:        Patient will be able to verbally and or in writing express basic wants and needs in home environment Naming continues to slowly improve. He was able to describe picture scenes with 4-6 correct information units per picture. He was able to write simple 3-5 letter words with 70% accuracy. Ongoing   Patient will comprehend basic spoken and written information presented in home environment Patient demonstrated understanding of spoken information in context.  Ongoing     Therapy Education/Self Care    Details: POC   Given Home Exercise Program   Progress: Reinforced   Education provided to:  Patient   Level of understanding Demonstrated           [address] Speech/Language/Cognitive Treatment,   Total Time of Visit:            45  mins             ASSESSMENT/PLAN     ASSESSMENT: Patient  continues to make good progress. Naming and writing/spelling are improving.     PLAN: Continue therapy  Progress Note Due Date: 2/9/2024  Recertification Due Date: 03/09/2025        Signature:  Zabrina Bhat CCC-SLP, [place] License #: [id number]  Electronically signed on 1/27/2025            [facility] [place]. [id number]

## 2025-01-29 ENCOUNTER — LAB (OUTPATIENT)
Dept: LAB | Facility: HOSPITAL | Age: 70
End: 2025-01-29
Payer: MEDICARE

## 2025-01-29 ENCOUNTER — OFFICE VISIT (OUTPATIENT)
Dept: NEUROLOGY | Facility: CLINIC | Age: 70
End: 2025-01-29
Payer: MEDICARE

## 2025-01-29 VITALS
OXYGEN SATURATION: 97 % | HEIGHT: 69 IN | WEIGHT: 248 LBS | DIASTOLIC BLOOD PRESSURE: 80 MMHG | SYSTOLIC BLOOD PRESSURE: 142 MMHG | HEART RATE: 65 BPM | BODY MASS INDEX: 36.73 KG/M2

## 2025-01-29 DIAGNOSIS — I69.30 HISTORY OF STROKE WITH RESIDUAL DEFICIT: Primary | ICD-10-CM

## 2025-01-29 DIAGNOSIS — R53.1 RIGHT SIDED WEAKNESS: ICD-10-CM

## 2025-01-29 DIAGNOSIS — R79.9 ABNORMAL FINDING OF BLOOD CHEMISTRY, UNSPECIFIED: ICD-10-CM

## 2025-01-29 DIAGNOSIS — R47.01 APHASIA: ICD-10-CM

## 2025-01-29 DIAGNOSIS — I69.30 HISTORY OF STROKE WITH RESIDUAL DEFICIT: ICD-10-CM

## 2025-01-29 DIAGNOSIS — Z98.1 STATUS POST CERVICAL SPINAL FUSION: ICD-10-CM

## 2025-01-29 DIAGNOSIS — E78.5 HYPERLIPIDEMIA LDL GOAL <70: ICD-10-CM

## 2025-01-29 LAB
CHOLEST SERPL-MCNC: 132 MG/DL (ref 0–200)
HBA1C MFR BLD: 5.8 % (ref 4.8–5.6)
HDLC SERPL-MCNC: 37 MG/DL (ref 40–60)
LDLC SERPL CALC-MCNC: 79 MG/DL (ref 0–100)
LDLC/HDLC SERPL: 2.14 {RATIO}
TRIGL SERPL-MCNC: 80 MG/DL (ref 0–150)
VLDLC SERPL-MCNC: 16 MG/DL (ref 5–40)

## 2025-01-29 PROCEDURE — 36415 COLL VENOUS BLD VENIPUNCTURE: CPT

## 2025-01-29 PROCEDURE — 80061 LIPID PANEL: CPT

## 2025-01-29 PROCEDURE — 83036 HEMOGLOBIN GLYCOSYLATED A1C: CPT

## 2025-01-29 NOTE — PROGRESS NOTES
Neurology Consult Note    [facility]  [address], [address]  [place] [address]  Phone: [phone]  Fax: [phone]    Referring Provider:   No ref. provider found  Primary Care Provider:  Shahid Dinero DO    Reason for Consult:  History of stroke  Subjective        Justin Londono presents to [facility]    History of Present Illness  69-year-old male who I am following for stroke.  Last seen in clinic by me on 7/31/2024.  Patient was seen after experiencing a multifocal left hemispheric infarctions after undergoing a cervical spine procedure in [place].  Residual effects to include mixed aphasia as well as right-sided weakness.  He was maintained on dual platelet therapy as well as atorvastatin 40 mg daily.    Today patient presents with his spouse.  Reports to me no new stroke symptoms since last being seen.  Does continue with some residual mixed aphasia.  Reports compliance with medications.  He is continuing with speech therapy.  Patient notes he is wanting to drive.  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    Impaired glucose tolerance    MARIMAR (obstructive sleep apnea)    Personal history of other colon polyps        Past Medical History:   Diagnosis Date    Bleeding tendency     Hyperlipidemia     Stroke         Social History     Socioeconomic History    Marital status:    Tobacco Use    Smoking status: Former     Current packs/day: 0.00     Average packs/day: 0.3 packs/day for 10.0 years (2.5 ttl pk-yrs)     Types: Cigarettes     Start date: 1980     Quit date: 1990      "Years since quittin.1     Passive exposure: Past    Smokeless tobacco: Never   Vaping Use    Vaping status: Never Used   Substance and Sexual Activity    Alcohol use: Yes     Comment: 10 drinks weekly    Drug use: Not Currently    Sexual activity: Defer     Partners: Female        No Known Allergies       Current Outpatient Medications:     aspirin 81 MG EC tablet, Take 1 tablet by mouth Daily., Disp: , Rfl:     atorvastatin (LIPITOR) 80 MG tablet, Take 1 tablet by mouth Every Night., Disp: 90 tablet, Rfl: 3    clopidogrel (PLAVIX) 75 MG tablet, Take 1 tablet by mouth Daily., Disp: 90 tablet, Rfl: 3    losartan (Cozaar) 50 MG tablet, Take 1 tablet by mouth Daily., Disp: 90 tablet, Rfl: 1       Objective   Vital Signs:   /80   Pulse 65   Ht 175.3 cm (69\")   Wt 112 kg (248 lb)   SpO2 97%   BMI 36.62 kg/m²       Physical Exam  Vitals and nursing note reviewed.   Constitutional:       Appearance: Normal appearance.   HENT:      Head: Normocephalic.   Eyes:      General: Lids are normal.      Extraocular Movements: Extraocular movements intact.      Pupils: Pupils are equal, round, and reactive to light.   Pulmonary:      Effort: Pulmonary effort is normal. No respiratory distress.   Skin:     General: Skin is warm and dry.   Neurological:      Mental Status: He is alert.      Deep Tendon Reflexes: Reflexes are normal and symmetric.   Psychiatric:         Speech: Speech normal.        Neurological Exam  Mental Status  Alert. Oriented to person, place, time and situation. Speech is normal. Language is fluent with no aphasia.    Cranial Nerves  CN II: Visual fields full to confrontation.  CN III, IV, VI: Extraocular movements intact bilaterally. Normal lids and orbits bilaterally. Pupils equal round and reactive to light bilaterally.  CN V: Facial sensation is normal.  CN VII: Full and symmetric facial movement.  CN IX, X: Palate elevates symmetrically. Normal gag reflex.  CN XI: Shoulder shrug strength is " normal.  CN XII: Tongue midline without atrophy or fasciculations.    Motor  Normal muscle bulk throughout. No fasciculations present. Normal muscle tone. No abnormal involuntary movements.  Very mild right-sided weakness compared to left.    Sensory  Light touch is normal in upper and lower extremities.     Reflexes  Deep tendon reflexes are 2+ and symmetric in all four extremities.    Gait  Casual gait is normal including stance, stride, and arm swing.      Result Review :   The following data was reviewed by: DOROTA Nguyen on 01/29/2025:  CMP          4/10/2024    15:06 7/1/2024    11:10   CMP   Glucose 96     107    BUN 11.0     12    Creatinine 1.12     0.97    EGFR 71.1     84.5    Sodium 143     137    Potassium 4.7     4.5    Chloride 106     102    Calcium 8.9     9.2    Total Protein 6.7     7.4    Albumin 3.0     4.1    Globulin  3.3    Total Bilirubin 0.3     0.4    Alkaline Phosphatase 83     83    AST (SGOT) 16     17    ALT (SGPT) 23     16    Albumin/Globulin Ratio  1.2    BUN/Creatinine Ratio 9.8     12.4    Anion Gap 7.9     7.0       Details          This result is from an external source.             CBC w/diff          7/1/2024    11:10   CBC w/Diff   WBC 5.77    RBC 4.30    Hemoglobin 13.6    Hematocrit 41.6    MCV 96.7    MCH 31.6    MCHC 32.7    RDW 13.6    Platelets 226      Lipid Panel          7/1/2024    11:10   Lipid Panel   Total Cholesterol 145    Triglycerides 98    HDL Cholesterol 42    VLDL Cholesterol 18    LDL Cholesterol  85    LDL/HDL Ratio 1.99      TSH          7/1/2024    11:10   TSH   TSH 4.160      Office Visit with Allan Butterfield APRN (07/31/2024)                   Impression:  Justin Londono is a 69 y.o. male who presents for follow-up of stroke.  Etiology felt to be procedural from his spinal surgery.  Regards to management, he is maintained on dual platelet therapy as well as statin therapy.  Would recommend no changes today.  Edition no further workup  indicated.  Recommend to continue secondary stroke preventative strategies.    Diagnoses and all orders for this visit:    1. History of stroke with residual deficit (Primary)  -     Lipid Panel; Future  -     Hemoglobin A1c; Future    2. Abnormal finding of blood chemistry, unspecified  -     Hemoglobin A1c; Future    3. Right sided weakness    4. Hyperlipidemia LDL goal <70    5. Status post cervical spinal fusion    6. Aphasia        Plan:  Continue aspirin 81 mg daily  Continue clopidogrel 75 mg daily  Continue atorvastatin 80 mg nightly  LDL goal less than 70  A1c goal less than 6.5  BP goal less than 130/80  Return to the emergency room for any new stroke symptoms  Recommend Mediterranean-style diet  Increase activities as tolerated  Regards to driving, no physical limitations, do have concerns in regards to speech.  Recommend patient to trial driving low speeds with a passenger to assess.  Follow-up with primary care as scheduled  Follow-up in my clinic 6 months or sooner if needed    The patient and I have discussed the plan of care and he is in full agreement at this time.     Follow Up   Return in about 6 months (around 7/29/2025), or if symptoms worsen or fail to improve, for Stroke/TIA.            Allan Butterfield, APRN  01/29/25  12:29 CST

## 2025-02-03 ENCOUNTER — HOSPITAL ENCOUNTER (OUTPATIENT)
Facility: HOSPITAL | Age: 70
Setting detail: THERAPIES SERIES
Discharge: HOME | End: 2025-02-03
Payer: MEDICARE

## 2025-02-03 DIAGNOSIS — R47.01 APHASIA: ICD-10-CM

## 2025-02-03 DIAGNOSIS — R48.2 APRAXIA: Primary | ICD-10-CM

## 2025-02-03 NOTE — PROGRESS NOTES
Speech Language Pathology Treatment Note and 30 Day Progress Note  [address] [place] [address]    Patient: Justin Londono                                                                                     Visit Date: 2/3/2025  :     1955    Referring practitioner:    Maite Dodd PA-C  Date of Initial Visit:       2024          Visit Diagnoses:    ICD-10-CM ICD-9-CM   1. Apraxia  R48.2 784.69   2. Aphasia  R47.01 784.3         SUBJECTIVE     Patient arrived alert and ready for therapy today.    Pain: No pain noted.   OBJECTIVE   GOALS    Goals                                         STG Comments Status   Patient will improve verbal expressive language skills by completing automatic speech tasks, naming objects/pictures, and completing open-ended phrases/sentences Patient was able to repeat two syllable words using listen/tap/choral/fade/repeat strategy. He had difficulty following direction to tap syllables with his hand along with SLP model.    Ongoing       Patient will improve comprehension of spoken language by following 1 step then two step directions presented verbally and/or in writing  Patient demonstrated understanding of conversation in context. He had difficulty following direction to tap along with apraxia task. He was able to follow directions on BITS tasks.  Ongoing   Patient will improve comprehension and expression of written language skills by complete functional reading and writing tasks Writing not addressed today. Previous: Patient was able to write simple words given object pictures (3-5 letter words) with 70% accuracy. He was able to notice his misspelled words about 1/2 the time. When unable to repair or initiate he was given the written word to copy.  Ongoing   Patient will demonstrate understanding of and use AAC device to augment communication and express wants and needs Patient is using SGD at  home to aid in communication. He did not bring it to therapy today.     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 He was able to repeat two syllable words with max prompts.  Ongoing   Patient will comprehend basic spoken and written information presented in home environment Patient demonstrated understanding of spoken information in context but needed cues to attend to specific directions. Difficulty with tapping task related to apraxia.  Ongoing     Therapy Education/Self Care    Details: POC   Given Home Exercise Program   Progress: Reinforced   Education provided to:  Patient   Level of understanding Demonstrated           [address] Speech/Language/Cognitive Treatment,   Total Time of Visit:            45  mins             ASSESSMENT/PLAN     ASSESSMENT: Patient  continues to make good progress. Able to repeat two syllable words with max cues today.     PLAN: Continue therapy  Progress Note Due Date: 3/9/2024  Recertification Due Date: 03/09/2025        Signature:  Zabrina Baht CCC-SLP, [place] License #: [id number]  Electronically signed on 2/3/2025            [address] [place] [address] [place] [address]

## 2025-02-10 ENCOUNTER — HOSPITAL ENCOUNTER (OUTPATIENT)
Facility: HOSPITAL | Age: 70
Setting detail: THERAPIES SERIES
Discharge: HOME | End: 2025-02-10
Payer: MEDICARE

## 2025-02-10 DIAGNOSIS — R48.2 APRAXIA: Primary | ICD-10-CM

## 2025-02-10 DIAGNOSIS — R47.01 APHASIA: ICD-10-CM

## 2025-02-10 PROCEDURE — 92507 TX SP LANG VOICE COMM INDIV: CPT | Performed by: SPEECH-LANGUAGE PATHOLOGIST

## 2025-02-10 NOTE — PROGRESS NOTES
"Vb                                                                        Speech Language Pathology Treatment Note and 30 Day Progress Note  [address] [place] [address]    Patient: Justin Londono                                                                                     Visit Date: 2/10/2025  :     1955    Referring practitioner:    Maite Dodd PA-C  Date of Initial Visit:       2024          Visit Diagnoses:    ICD-10-CM ICD-9-CM   1. Apraxia  R48.2 784.69   2. Aphasia  R47.01 784.3         SUBJECTIVE     Patient arrived alert and ready for therapy today.    Pain: No pain noted.   OBJECTIVE   GOALS    Goals                                         STG Comments Status   Patient will improve verbal expressive language skills by completing automatic speech tasks, naming objects/pictures, and completing open-ended phrases/sentences Given one syllable words and listen/tap/choral/fade, patient was able to repeat CVC with initial /b/ 80%, initial /w/ 80%, initial /m/ 70% and initial /p/ 50%   Ongoing       Patient will improve comprehension of spoken language by following 1 step then two step directions presented verbally and/or in writing Patient had difficulty with one step directions involving \"on the left of\" vs \"on the right of\" he was able to follow \"above\" and \"below\". He was able to follow \"touch the ___\" with one object, but had difficulty with two objects. He had difficulty with size and pattern adjectives, but did fair/well with color adjectives.  Ongoing   Patient will improve comprehension and expression of written language skills by complete functional reading and writing tasks Writing not addressed today. Previous: Patient was able to write simple words given object pictures (3-5 letter words) with 70% accuracy. He was able to notice his misspelled words about 1/2 the time. When unable to repair or initiate he was given the written word to copy.  Ongoing   Patient will " demonstrate understanding of and use AAC device to augment communication and express wants and needs Patient is using SGD at home to aid in communication. He did not bring it to therapy today.     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 He was able to repeat some one and some two syllable words with max prompts. More difficulty with consonant blends.  Ongoing   Patient will comprehend basic spoken and written information presented in home environment Patient demonstrated understanding of spoken information in context but needed cues to attend to specific directions. Difficulty with tapping task related to apraxia.  Ongoing     Therapy Education/Self Care    Details: POC   Given Home Exercise Program   Progress: Reinforced   Education provided to:  Patient   Level of understanding Demonstrated           [address]1009 Speech/Language/Cognitive Treatment,   Total Time of Visit:            45  mins             ASSESSMENT/PLAN     ASSESSMENT: Patient  continues to make good progress. Able to repeat some one and two syllable words with max cues today.     PLAN: Continue therapy  Progress Note Due Date: 3/9/2025  Recertification Due Date: 03/09/2025        Signature:  Zabrina Bhat CCC-SLP, [place] License #: [id number]  Electronically signed on 2/10/2025            Nils Montano. [id number]

## 2025-02-17 ENCOUNTER — HOSPITAL ENCOUNTER (OUTPATIENT)
Facility: HOSPITAL | Age: 70
Setting detail: THERAPIES SERIES
Discharge: HOME | End: 2025-02-17
Payer: MEDICARE

## 2025-02-17 DIAGNOSIS — R47.01 APHASIA: ICD-10-CM

## 2025-02-17 DIAGNOSIS — R48.2 APRAXIA: Primary | ICD-10-CM

## 2025-02-17 PROCEDURE — 92507 TX SP LANG VOICE COMM INDIV: CPT | Performed by: SPEECH-LANGUAGE PATHOLOGIST

## 2025-02-17 NOTE — PROGRESS NOTES
Speech Language Pathology Treatment Note  [address] [place] [address]    Patient: Justin Londono                                                                                     Visit Date: 2025  :     1955    Referring practitioner:    Maite Dodd PA-C  Date of Initial Visit:       2024          Visit Diagnoses:    ICD-10-CM ICD-9-CM   1. Apraxia  R48.2 784.69   2. Aphasia  R47.01 784.3         SUBJECTIVE     Patient arrived alert and ready for therapy today.    Pain: No pain noted.   OBJECTIVE   GOALS    Goals                                         STG Comments Status   Patient will improve verbal expressive language skills by completing automatic speech tasks, naming objects/pictures, and completing open-ended phrases/sentences Previous: Given one syllable words and listen/tap/choral/fade, patient was able to repeat CVC with initial /b/ 80%, initial /w/ 80%, initial /m/ 70% and initial /p/ 50%   Ongoing       Patient will improve comprehension of spoken language by following 1 step then two step directions presented verbally and/or in writing Continued difficulty with following directions involving placing an item above, below, to the left or to the right of another item. With just one element (L, R, A, B) patient was able to follow with at least 80%, given addition of another element (second item) patient had more difficulty. With cues and practice he was able to increase attending to details and increase accuracy.  Ongoing   Patient will improve comprehension and expression of written language skills by complete functional reading and writing tasks Given a single 2-4 letter word, patient needed cues for spelling, he spelled 2/10 words correctly without hints. He was able to identify the correct word spoken with f/4 4 choices.  Ongoing   Patient will demonstrate understanding of and use AAC device to  augment communication and express wants and needs Patient is using SGD at home to aid in communication. He did not bring it to therapy today.     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 He was able to repeat some one and some two syllable words with max prompts. More difficulty with consonant blends.  Ongoing   Patient will comprehend basic spoken and written information presented in home environment Patient demonstrated understanding of spoken information in context but needed cues to attend to specific directions, addition of elements decreased accuracy.   Ongoing     Therapy Education/Self Care    Details: POC   Given Home Exercise Program   Progress: Reinforced   Education provided to:  Patient   Level of understanding Demonstrated           [address] Speech/Language/Cognitive Treatment,   Total Time of Visit:            45  mins             ASSESSMENT/PLAN     ASSESSMENT: Patient  continues to make good progress. Able to read spoken words in f/4, difficulty with spelling. Able to demonstrate left/right above/below but addition of elements (# of pictures) decreased accuracy.     PLAN: Continue therapy  Progress Note Due Date: 3/9/2025  Recertification Due Date: 03/09/2025        Signature:  Zabrina Bhat CCC-SLP, [place] License #: [id number]  Electronically signed on 2/17/2025            Nils Montano. [id number]

## 2025-02-24 ENCOUNTER — HOSPITAL ENCOUNTER (OUTPATIENT)
Facility: HOSPITAL | Age: 70
Setting detail: THERAPIES SERIES
Discharge: HOME | End: 2025-02-24
Payer: MEDICARE

## 2025-02-24 DIAGNOSIS — R48.2 APRAXIA: Primary | ICD-10-CM

## 2025-02-24 DIAGNOSIS — R47.01 APHASIA: ICD-10-CM

## 2025-02-24 PROCEDURE — 92507 TX SP LANG VOICE COMM INDIV: CPT | Performed by: SPEECH-LANGUAGE PATHOLOGIST

## 2025-02-24 NOTE — PROGRESS NOTES
Speech Language Pathology Treatment Note  [address] [place] [address]    Patient: Justin Londono                                                                                     Visit Date: 2025  :     1955    Referring practitioner:    Maite Dodd PA-C  Date of Initial Visit:       2024          Visit Diagnoses:    ICD-10-CM ICD-9-CM   1. Apraxia  R48.2 784.69   2. Aphasia  R47.01 784.3         SUBJECTIVE     Patient arrived alert and ready for therapy today.    Pain: No pain noted.   OBJECTIVE   GOALS    Goals                                         STG Comments Status   Patient will improve verbal expressive language skills by completing automatic speech tasks, naming objects/pictures, and completing open-ended phrases/sentences Patient was able to describe small picture scenes with 4-6 correct information units using words and phrases including verbs (reading, sitting, writing, having, walking, laying, waiting, looking, getting, got, having, drinking, having dinner, play) man, woman, boy, girl, he/her etc, and some nouns (chair, light, wand, car, boat, tree) more verbs than nouns were correct. Also some adjectives (warm, bunch, cold, a lot, alone). He repeated single words 10/13.    Ongoing       Patient will improve comprehension of spoken language by following 1 step then two step directions presented verbally and/or in writing Patient had difficulty understanding questions regarding his schedule, needed visual prompts. Following directions not addressed.  Ongoing   Patient will improve comprehension and expression of written language skills by complete functional reading and writing tasks Writing not addressed today. Given a single 2-4 letter word, patient needed cues for spelling, he spelled 2/10 words correctly without hints. He was able to identify the correct word spoken with f/4 4 choices.  Ongoing    Patient will demonstrate understanding of and use AAC device to augment communication and express wants and needs Patient is using SGD at home to aid in communication. He was able to tell me that he used it when his family came to visit for his birthday this past weekend. He did not bring it to therapy today.     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 He was able to repeat some one and some two syllable words with max prompts. More difficulty with consonant blends.  Ongoing   Patient will comprehend basic spoken and written information presented in home environment Patient demonstrated understanding of spoken information in context but needed cues to attend to specific directions, addition of elements decreased accuracy.   Ongoing     Therapy Education/Self Care    Details: POC   Given Home Exercise Program   Progress: Reinforced   Education provided to:  Patient   Level of understanding Demonstrated           [address] Speech/Language/Cognitive Treatment,   Total Time of Visit:            45  mins             ASSESSMENT/PLAN     ASSESSMENT: Patient  continues to make good progress. More verbs than nouns correct in picture scene description. Able to repeat single words 10/13, mostly single syllable words without blends.     PLAN: Continue therapy  Progress Note Due Date: 3/9/2025  Recertification Due Date: 03/09/2025        Signature:  Zabrina Bhat CCC-SLP, [place] License #: [id number]  Electronically signed on 2/24/2025            [address] [place] [address]

## 2025-03-05 ENCOUNTER — HOSPITAL ENCOUNTER (OUTPATIENT)
Facility: HOSPITAL | Age: 70
Setting detail: THERAPIES SERIES
Discharge: HOME | End: 2025-03-05
Payer: MEDICARE

## 2025-03-05 DIAGNOSIS — R48.2 APRAXIA: Primary | ICD-10-CM

## 2025-03-05 DIAGNOSIS — R47.01 APHASIA: ICD-10-CM

## 2025-03-05 PROCEDURE — 92507 TX SP LANG VOICE COMM INDIV: CPT | Performed by: SPEECH-LANGUAGE PATHOLOGIST

## 2025-03-05 NOTE — PROGRESS NOTES
Speech Language Pathology Treatment Note  [address] [place] [address]    Patient: Justin Londono                                                                                     Visit Date: 3/5/2025  :     1955    Referring practitioner:    Maite Dodd PA-C  Date of Initial Visit:       2024          Visit Diagnoses:    ICD-10-CM ICD-9-CM   1. Apraxia  R48.2 784.69   2. Aphasia  R47.01 784.3         SUBJECTIVE     Patient arrived alert and ready for therapy today.    Pain: No pain noted.   OBJECTIVE   GOALS    Goals                                         STG Comments Status   Patient will improve verbal expressive language skills by completing automatic speech tasks, naming objects/pictures, and completing open-ended phrases/sentences With naming/writing single words task from given pictures (T=48): patient was able to name 32 pictures (66%) and repeat 13/16 of the remaining words (81%).    Ongoing       Patient will improve comprehension of spoken language by following 1 step then two step directions presented verbally and/or in writing Patient demonstrated understanding of task instructions and conversation. Following directions not directly addressed today.   Ongoing   Patient will improve comprehension and expression of written language skills by complete functional reading and writing tasks With naming/writing task, patient was able to write 28/48 words (58%) and copy the remaining 20 words correctly. He is improving in ability to self identify if he has written the word incorrectly. He did write related words (bat for ball) and unrelated words (cake for pan and couch for pants) as well as letter substitutions (cafe for cane and lauren for rope).  Ongoing   Patient will demonstrate understanding of and use AAC device to augment communication and express wants and needs Patient is using SGD at home to aid in  communication.   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 Repeating simple words improved today. Writing simple single words improving.  Ongoing   Patient will comprehend basic spoken and written information presented in home environment Patient demonstrated understanding of spoken information in context.  Ongoing     Therapy Education/Self Care    Details: POC   Given Home Exercise Program   Progress: Reinforced   Education provided to:  Patient   Level of understanding Demonstrated           [address] Speech/Language/Cognitive Treatment,   Total Time of Visit:            55  mins             ASSESSMENT/PLAN     ASSESSMENT: Patient  continues to make good progress. Able to communicate simple single words by speech and or writing 80%. Significant communication deficit remains.     PLAN: Continue therapy  Progress Note Due Date: 4/2/2025  Recertification Due Date: 6/2/2025        Signature:  Zabrina Bhat CCC-SLP, [place] License #: [id number]  Electronically signed on 3/5/2025      90 Day Recertification  Certification Period: 3/5/2025 through 6/2/2025  I certify that the therapy services are furnished while this patient is under my care.  The services outlined above are required by this patient, and will be reviewed every 90 days.     PHYSICIAN: Maite Dodd PA-C (NPI: [id number])    Signature:___________________________________________DATE: _________    Please sign and return via fax to [phone].   Thank you so much for letting us work with Justin. I appreciate your letting us work with your patients. If you have any questions or concerns, please don't hesitate to contact me.          .        [address] [id number]

## 2025-03-12 ENCOUNTER — OFFICE VISIT (OUTPATIENT)
Dept: INTERNAL MEDICINE | Facility: CLINIC | Age: 70
End: 2025-03-12
Payer: MEDICARE

## 2025-03-12 VITALS
SYSTOLIC BLOOD PRESSURE: 138 MMHG | OXYGEN SATURATION: 98 % | DIASTOLIC BLOOD PRESSURE: 76 MMHG | BODY MASS INDEX: 33.38 KG/M2 | RESPIRATION RATE: 16 BRPM | WEIGHT: 225.4 LBS | HEART RATE: 65 BPM | HEIGHT: 69 IN

## 2025-03-12 DIAGNOSIS — R73.02 IMPAIRED GLUCOSE TOLERANCE: ICD-10-CM

## 2025-03-12 DIAGNOSIS — E66.09 CLASS 1 OBESITY DUE TO EXCESS CALORIES WITH SERIOUS COMORBIDITY AND BODY MASS INDEX (BMI) OF 33.0 TO 33.9 IN ADULT: ICD-10-CM

## 2025-03-12 DIAGNOSIS — R47.01 EXPRESSIVE APHASIA: ICD-10-CM

## 2025-03-12 DIAGNOSIS — E66.811 CLASS 1 OBESITY DUE TO EXCESS CALORIES WITH SERIOUS COMORBIDITY AND BODY MASS INDEX (BMI) OF 33.0 TO 33.9 IN ADULT: ICD-10-CM

## 2025-03-12 DIAGNOSIS — I10 PRIMARY HYPERTENSION: Primary | ICD-10-CM

## 2025-03-12 DIAGNOSIS — Z86.73 HISTORY OF STROKE: ICD-10-CM

## 2025-03-12 NOTE — PROGRESS NOTES
CC: f/u history of stroke    History:  Justin Londono is a 70 y.o. male   History of Present Illness  The patient came in for evaluation of dizziness, knee pain, and concerns related to his stroke.    He describes feeling dizzy and unstable when moving from sitting to standing, but he hasn't had any falls. He has a knee condition that might need surgery and is worried about stopping his blood thinners twice in one year. He has an appointment with Dr. Oquendo on March 31, 2025. A knee injection helped for about two weeks, but he didn't get much relief.     He hasn't had a colonoscopy yet because of his blood thinner regimen after his stroke. He has an appointment next week to discuss this further. He's currently taking aspirin and Plavix, but he wasn't on these medications at the time of his stroke. He mentions occasional bruising but no chest pain, heart racing, or irregular heartbeats. He continues to attend weekly speech therapy and feels he's making progress.    Patient has been erroneously marked as diabetic. Based on the available clinical information, he does not have diabetes and should therefore be excluded from diabetic health maintenance and quality measures for the remainder of the reporting period.      ROS:  Review of Systems   Constitutional:  Negative for chills and fever.   Respiratory:  Negative for cough and shortness of breath.    Cardiovascular:  Negative for chest pain and palpitations.   Gastrointestinal:  Negative for abdominal pain and constipation.   Genitourinary:  Negative for difficulty urinating and dysuria.   Neurological:  Positive for dizziness and speech difficulty.        reports that he quit smoking about 35 years ago. His smoking use included cigarettes. He started smoking about 45 years ago. He has a 2.5 pack-year smoking history. He has been exposed to tobacco smoke. He has never used smokeless tobacco. He reports current alcohol use. He reports that he does not currently use  "drugs.      Current Outpatient Medications:     aspirin 81 MG EC tablet, Take 1 tablet by mouth Daily., Disp: , Rfl:     atorvastatin (LIPITOR) 80 MG tablet, Take 1 tablet by mouth Every Night., Disp: 90 tablet, Rfl: 3    clopidogrel (PLAVIX) 75 MG tablet, Take 1 tablet by mouth Daily., Disp: 90 tablet, Rfl: 3    losartan (Cozaar) 50 MG tablet, Take 1 tablet by mouth Daily., Disp: 90 tablet, Rfl: 1    OBJECTIVE:  /76 (BP Location: Left arm, Patient Position: Sitting, Cuff Size: Adult)   Pulse 65   Resp 16   Ht 175.3 cm (69\")   Wt 102 kg (225 lb 6.4 oz)   SpO2 98%   BMI 33.29 kg/m²    Physical Exam  Constitutional:       General: He is not in acute distress.  Cardiovascular:      Rate and Rhythm: Normal rate and regular rhythm.      Heart sounds: Normal heart sounds. No murmur heard.  Pulmonary:      Effort: Pulmonary effort is normal.      Breath sounds: Normal breath sounds. No wheezing.   Neurological:      Mental Status: He is alert and oriented to person, place, and time.      Gait: Gait normal.   Psychiatric:         Mood and Affect: Mood normal.         Behavior: Behavior normal.           Assessment/Plan     Diagnoses and all orders for this visit:    1. Primary hypertension (Primary)  Well controlled, BP goal for age is <140/90 per JNC 8 guidelines, and continue current medications    2. Impaired glucose tolerance  Monitor with labs and continue to monitor with labs serially.     3. History of stroke  4. Expressive aphasia  He continues to work with SLP. Given that he is >1 year out from his stroke and he was not on any APT prior to therapy, I have d/w [place] [employer] via [employer] and we will stop his Plavix. He should be able to undergo elective procedures at this time, but would prefer to continue Aspirin perioperatively unless bleeding risk is high for an individual procedure given his history of perioperative stroke.     5. Class 1 obesity due to excess calories with serious comorbidity and " body mass index (BMI) of 33.0 to 33.9 in adult  Continue lifestyle modification.     An After Visit Summary was printed and given to the patient at discharge.  Return in about 6 months (around 9/12/2025) for Medicare Wellness.      Patient or patient representative verbalized consent for the use of Ambient Listening during the visit with  Shahid Dinero DO for chart documentation. 3/15/2025  10:26 CDT    Shahid Dinero D.O. 3/15/2025   Electronically signed.

## 2025-03-18 ENCOUNTER — OFFICE VISIT (OUTPATIENT)
Dept: GASTROENTEROLOGY | Facility: CLINIC | Age: 70
End: 2025-03-18
Payer: MEDICARE

## 2025-03-18 VITALS
HEIGHT: 69 IN | OXYGEN SATURATION: 98 % | HEART RATE: 64 BPM | DIASTOLIC BLOOD PRESSURE: 82 MMHG | TEMPERATURE: 96.9 F | WEIGHT: 225 LBS | BODY MASS INDEX: 33.33 KG/M2 | SYSTOLIC BLOOD PRESSURE: 142 MMHG

## 2025-03-18 DIAGNOSIS — Z12.11 ENCOUNTER FOR SCREENING FOR MALIGNANT NEOPLASM OF COLON: Primary | ICD-10-CM

## 2025-03-18 DIAGNOSIS — D68.9 COAGULOPATHY: ICD-10-CM

## 2025-03-18 NOTE — PROGRESS NOTES
"      [facility]    Primary Physician Shahid Dinero,     3/18/2025    Justin Londono   1955      Chief Complaint   Patient presents with    Colonoscopy       Subjective     HPI    Justin Londono is a 70 y.o. male who presents as a referral for preventative maintenance. He has no complaints of nausea or vomiting. No change in bowels. No wt loss. No BRBPR. No melena. No abdominal pain.       He is on plavix. History of cva. He was told recently by his pcp that he could stop plavix indefinitely.     Last colonoscopy more than 10 years ago. Had colon polyps.     Past Medical History:   Diagnosis Date    Bleeding tendency     Hyperlipidemia     Stroke        Past Surgical History:   Procedure Laterality Date    CERVICAL DISC ARTHROPLASTY      2024    COLONOSCOPY      2014?  polyps    THROMBECTOMY  2024    \"clot removed from brain\"    TOTAL HIP ARTHROPLASTY             Outpatient Medications Marked as Taking for the 3/18/25 encounter (Office Visit) with Charu Salvador APRN   Medication Sig Dispense Refill    aspirin 81 MG EC tablet Take 1 tablet by mouth Daily.      atorvastatin (LIPITOR) 80 MG tablet Take 1 tablet by mouth Every Night. 90 tablet 3    clopidogrel (PLAVIX) 75 MG tablet Take 1 tablet by mouth Daily. 90 tablet 3    losartan (Cozaar) 50 MG tablet Take 1 tablet by mouth Daily. 90 tablet 1       No Known Allergies    Social History     Socioeconomic History    Marital status:    Tobacco Use    Smoking status: Former     Current packs/day: 0.00     Average packs/day: 0.3 packs/day for 10.0 years (2.5 ttl pk-yrs)     Types: Cigarettes     Start date:      Quit date:      Years since quittin.2     Passive exposure: Past    Smokeless tobacco: Never   Vaping Use    Vaping status: Never Used   Substance and Sexual Activity    Alcohol use: Yes     Comment: daily    Drug use: Not Currently    Sexual activity: Defer     Partners: Female "       Family History   Problem Relation Age of Onset    Arthritis Mother     Colon cancer Neg Hx     Colon polyps Neg Hx        Review of Systems   Constitutional:  Negative for chills, fever and unexpected weight change.   Respiratory:  Negative for shortness of breath.    Cardiovascular:  Negative for chest pain.   Gastrointestinal:  Negative for abdominal distention, abdominal pain, anal bleeding, blood in stool, constipation, diarrhea, nausea and vomiting.       Objective     Vitals:    03/18/25 0857   BP: 142/82   Pulse: 64   Temp: 96.9 °F (36.1 °C)   SpO2: 98%         03/18/25  0857   Weight: 102 kg (225 lb)     Body mass index is 33.23 kg/m².    Physical Exam  Vitals reviewed.   Constitutional:       General: He is not in acute distress.  Cardiovascular:      Rate and Rhythm: Normal rate and regular rhythm.      Heart sounds: Normal heart sounds.   Pulmonary:      Effort: Pulmonary effort is normal.      Breath sounds: Normal breath sounds.   Abdominal:      General: Bowel sounds are normal. There is no distension.      Palpations: Abdomen is soft.      Tenderness: There is no abdominal tenderness.   Skin:     General: Skin is warm and dry.   Neurological:      Mental Status: He is alert.         Imaging Results (Most Recent)       None            Assessment & Plan     Diagnoses and all orders for this visit:    1. Encounter for screening for malignant neoplasm of colon (Primary)  -     Case Request; Standing  -     Case Request    2. Coagulopathy  Comments:  plavix.    Other orders  -     Implement Anesthesia Orders Day of Procedure; Standing  -     Follow Anesthesia Guidelines / Protocol; Future      Schedule colonoscopy. Miralax prep.   He is going to stop plavix indefinitely ( was instructed by his pcp). He understands that he would need to be off plavix 7 days prior to procedure.   He may continue asa 81 mg daily.                           COLONOSCOPY WITH ANESTHESIA (N/A)  All risks, benefits,  alternatives, and indications of colonoscopy procedure have been discussed with the patient. Risks to include perforation of the colon requiring possible surgery or colostomy, risk of bleeding from biopsies or removal of colon tissue, possibility of missing a colon polyp or cancer, or adverse drug reaction.  Benefits to include the diagnosis and management of disease of the colon and rectum. Alternatives to include barium enema, radiographic evaluation, lab testing or no intervention. Pt verbalizes understanding and agrees.     There are no Patient Instructions on file for this visit.    DOROTA Faarh

## 2025-03-19 ENCOUNTER — HOSPITAL ENCOUNTER (OUTPATIENT)
Facility: HOSPITAL | Age: 70
Setting detail: THERAPIES SERIES
Discharge: HOME | End: 2025-03-19
Payer: MEDICARE

## 2025-03-19 DIAGNOSIS — R48.2 APRAXIA: Primary | ICD-10-CM

## 2025-03-19 DIAGNOSIS — R47.01 APHASIA: ICD-10-CM

## 2025-03-19 PROCEDURE — 92507 TX SP LANG VOICE COMM INDIV: CPT | Performed by: SPEECH-LANGUAGE PATHOLOGIST

## 2025-03-19 NOTE — PROGRESS NOTES
Speech Language Pathology Treatment Note  [address] [place] [address]    Patient: Justin Londono                                                                                     Visit Date: 3/19/2025  :     1955    Referring practitioner:    Maite Dodd PA-C  Date of Initial Visit:       2024          Visit Diagnoses:    ICD-10-CM ICD-9-CM   1. Apraxia  R48.2 784.69   2. Aphasia  R47.01 784.3         SUBJECTIVE     Patient arrived alert and ready for therapy today.    Pain: He c/o his hand hurting during writing, unable to rate.   OBJECTIVE   GOALS    Goals                                         STG Comments Status   Patient will improve verbal expressive language skills by completing automatic speech tasks, naming objects/pictures, and completing open-ended phrases/sentences With naming/writing single words task from given pictures (T=48): patient was able to name 24 pictures (50%) and repeat 17/24 of the remaining words (70%).    Ongoing       Patient will improve comprehension of spoken language by following 1 step then two step directions presented verbally and/or in writing Patient demonstrated understanding of task instructions and conversation. Following directions not directly addressed today.   Ongoing   Patient will improve comprehension and expression of written language skills by complete functional reading and writing tasks With naming/writing task, patient was able to write 42/48 words (87%) and copy the remaining 6 words correctly. Spelling is improving He did write related words (Dollar for Money, knife for fork)  but no unrelated words. Continued with letter substitutions (rub for rug, chico for rope, belt for bell).  Ongoing   Patient will demonstrate understanding of and use AAC device to augment communication and express wants and needs Patient is using SGD at home to aid in communication.    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 Repeating simple words improved today. Writing simple single words improving.  Ongoing   Patient will comprehend basic spoken and written information presented in home environment Patient demonstrated understanding of spoken information in context.  Ongoing     Therapy Education/Self Care    Details: POC   Given Home Exercise Program   Progress: Reinforced   Education provided to:  Patient   Level of understanding Demonstrated           [address]48 Speech/Language/Cognitive Treatment,   Total Time of Visit:            55  mins             ASSESSMENT/PLAN     ASSESSMENT: Patient  continues to make good progress. Improved spelling/writing today. Able to communicate simple single words by speech and or writing 87%. Significant communication deficit remains.     PLAN: Continue therapy  Progress Note Due Date: 4/2/2025  Recertification Due Date: 6/2/2025        Signature:  Zabrina Bhat CCC-SLP, [place] License #: [id number]  Electronically signed on 3/19/2025           Nils Montano. [id number]

## 2025-03-26 ENCOUNTER — HOSPITAL ENCOUNTER (OUTPATIENT)
Facility: HOSPITAL | Age: 70
Setting detail: THERAPIES SERIES
Discharge: HOME | End: 2025-03-26
Payer: MEDICARE

## 2025-03-26 DIAGNOSIS — R47.01 APHASIA: ICD-10-CM

## 2025-03-26 DIAGNOSIS — R48.2 APRAXIA: Primary | ICD-10-CM

## 2025-03-26 PROCEDURE — 92507 TX SP LANG VOICE COMM INDIV: CPT | Performed by: SPEECH-LANGUAGE PATHOLOGIST

## 2025-03-26 NOTE — PROGRESS NOTES
Speech Language Pathology Treatment Note  [address] [place] [address]    Patient: Justin Londono                                                                                     Visit Date: 3/26/2025  :     1955    Referring practitioner:    Maite Dodd PA-C  Date of Initial Visit:       2024          Visit Diagnoses:    ICD-10-CM ICD-9-CM   1. Apraxia  R48.2 784.69   2. Aphasia  R47.01 784.3         SUBJECTIVE     Patient arrived alert and ready for therapy today.    Pain: Patient did not express any pain today.   OBJECTIVE   GOALS    Goals                                         STG Comments Status   Patient will improve verbal expressive language skills by completing automatic speech tasks, naming objects/pictures, and completing open-ended phrases/sentences With naming/writing single words task, with typing on keyboard w/o predictive text, from given pictures (T=60): patient was able to name 43 pictures (71%) and repeat 15/17 of the remaining words (88%).    Ongoing       Patient will improve comprehension of spoken language by following 1 step then two step directions presented verbally and/or in writing Patient demonstrated understanding of task instructions and conversation. Following directions not directly addressed today.   Ongoing   Patient will improve comprehension and expression of written language skills by complete functional reading and writing tasks With naming/writing task, typing on keyboard w/o predictive text, patient was able to write 35/60 words (58%) and write with cues or copy the remaining 25 words correctly. Cues ranged from minimal (giving first letter) to direct copy.   Ongoing   Patient will demonstrate understanding of and use AAC device to augment communication and express wants and needs Patient is using SGD at home to aid in communication. Demonstrated use of keyboard correctly.  Was not given access to predictive text during writing activity today to better assess spelling.   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 Repeating simple words improved today. Writing simple single words improving.  Ongoing   Patient will comprehend basic spoken and written information presented in home environment Patient demonstrated understanding of spoken information in context.  Ongoing     Therapy Education/Self Care    Details: POC   Given Home Exercise Program   Progress: Reinforced   Education provided to:  Patient   Level of understanding Demonstrated           [address] Speech/Language/Cognitive Treatment,   Total Time of Visit:            55  mins             ASSESSMENT/PLAN     ASSESSMENT: Patient  continues to make good progress. Improved spelling today. Able to communicate simple single words by speaking 71%. Decline in % words spelled, but increase of number of words total. Significant communication deficit remains.     PLAN: Continue therapy  Progress Note Due Date: 4/2/2025  Recertification Due Date: 6/2/2025        Signature:  Zabrina Bhat CCC-SLP, [place] License #: [id number]  Electronically signed on 3/26/2025           [address] [place] [address] [place] [address]

## 2025-03-31 ENCOUNTER — OFFICE VISIT (OUTPATIENT)
Age: 70
End: 2025-03-31
Payer: MEDICARE

## 2025-03-31 VITALS — WEIGHT: 225 LBS | HEIGHT: 69 IN | BODY MASS INDEX: 33.33 KG/M2

## 2025-03-31 DIAGNOSIS — M71.21 POPLITEAL CYST, RIGHT: ICD-10-CM

## 2025-03-31 DIAGNOSIS — M79.651 RIGHT THIGH PAIN: ICD-10-CM

## 2025-03-31 DIAGNOSIS — M17.11 PRIMARY OSTEOARTHRITIS OF RIGHT KNEE: Primary | ICD-10-CM

## 2025-03-31 DIAGNOSIS — Z96.642 HISTORY OF TOTAL LEFT HIP REPLACEMENT: ICD-10-CM

## 2025-03-31 PROCEDURE — 1159F MED LIST DOCD IN RCRD: CPT | Performed by: STUDENT IN AN ORGANIZED HEALTH CARE EDUCATION/TRAINING PROGRAM

## 2025-03-31 PROCEDURE — 99214 OFFICE O/P EST MOD 30 MIN: CPT | Performed by: STUDENT IN AN ORGANIZED HEALTH CARE EDUCATION/TRAINING PROGRAM

## 2025-03-31 PROCEDURE — 1160F RVW MEDS BY RX/DR IN RCRD: CPT | Performed by: STUDENT IN AN ORGANIZED HEALTH CARE EDUCATION/TRAINING PROGRAM

## 2025-03-31 RX ORDER — DICLOFENAC SODIUM 75 MG/1
75 TABLET, DELAYED RELEASE ORAL 2 TIMES DAILY
Qty: 60 TABLET | Refills: 0 | Status: SHIPPED | OUTPATIENT
Start: 2025-03-31

## 2025-03-31 RX ORDER — METHOCARBAMOL 750 MG/1
750 TABLET, FILM COATED ORAL NIGHTLY PRN
Qty: 30 TABLET | Refills: 0 | Status: SHIPPED | OUTPATIENT
Start: 2025-03-31

## 2025-03-31 NOTE — PROGRESS NOTES
[facility] Orthopedics & Sports Medicine  Pedro Oquendo MD, PhD  Wojciech Oquendo PA-C    CHIEF COMPLAINT  Follow-up of the Right Knee (Patient presents to the office today for right knee follow up. Injection given at last office visit on 12/27/2024. Patient states injection helped for about 2 weeks ago.)       HISTORY OF PRESENT ILLNESS    History of Present Illness  The patient is a 70-year-old male who presents for follow-up on his knee pain. He had a steroid injection to the knee at the last visit.    The patient reports that the steroid injection provided relief for approximately 2 weeks, after which the pain returned and intensified. He experiences instability in his knee, describing it as feeling like it might give out. The pain extends from his thigh downwards, but he does not experience any groin discomfort. He also reports difficulty in turning his leg outward. He has been managing the pain with ibuprofen 800 mg. He has a history of left hip replacement surgery performed in [place].    Supplemental Information  He was taken off Plavix 2 days ago.       HISTORY    Current Outpatient Medications   Medication Instructions    aspirin 81 MG EC tablet 1 tablet, Daily    atorvastatin (LIPITOR) 80 mg, Oral, Nightly    diclofenac (VOLTAREN) 75 mg, Oral, 2 Times Daily    losartan (COZAAR) 50 mg, Oral, Daily    methocarbamol (ROBAXIN) 750 mg, Oral, Nightly PRN         reports that he quit smoking about 35 years ago. His smoking use included cigarettes. He started smoking about 45 years ago. He has a 2.5 pack-year smoking history. He has been exposed to tobacco smoke. He has never used smokeless tobacco. He reports current alcohol use. He reports that he does not currently use drugs.    Past Medical History:   Diagnosis Date    Bleeding tendency     Hyperlipidemia     Stroke         Past Surgical History:   Procedure Laterality Date    CERVICAL DISC ARTHROPLASTY      2/01/2024    COLONOSCOPY      2014?   "polyps    THROMBECTOMY  02/01/2024    \"clot removed from brain\"    TOTAL HIP ARTHROPLASTY      2022        PHYSICAL EXAM  Constitutional: The patient is in no apparent distress and generally well-appearing. The patient hears me clearly and answers questions appropriately.   Musculoskeletal:  Right knee and thigh:  Mild tenderness to palpation throughout the anterior thigh musculature  Lacking about 5 degrees of full extension at the knee  No significant crepitus  Reduced external range of motion at the hip  Negative logrolling  Mild weakness with extension of flexion at the knee    Physical Exam  Knee and hip were examined.      IMAGING  Narrative & Impression   EXAMINATION: XR KNEE 3+ VW W SUNRISE RIGHT-     12/11/2024 10:33 AM     HISTORY: chronic arthritis; M17.11-Unilateral primary osteoarthritis,  right knee     4 view RIGHT knee exam.     Moderate degenerative narrowing of the medial tibiofemoral compartment.  Mild degenerative spurring of the patella.     No significant joint effusion.     No fracture or dislocation.     IMPRESSION:  1. Osteoarthritic changes of the anterior and medial compartment.           This report was signed and finalized on 12/11/2024 2:58 PM by Dr. Keenan Howell MD.     No results found.     Results  Imaging  Left hip x-ray from 2019 shows no issues.       ASSESSMENT & PLAN  Diagnoses and all orders for this visit:    1. Primary osteoarthritis of right knee (Primary)  -     diclofenac (VOLTAREN) 75 MG EC tablet; Take 1 tablet by mouth 2 (Two) Times a Day.  Dispense: 60 tablet; Refill: 0  -     methocarbamol (ROBAXIN) 750 MG tablet; Take 1 tablet by mouth At Night As Needed for Muscle Spasms.  Dispense: 30 tablet; Refill: 0  -     XR Hip With or Without Pelvis 2 - 3 View Right; Future    2. Right thigh pain  -     diclofenac (VOLTAREN) 75 MG EC tablet; Take 1 tablet by mouth 2 (Two) Times a Day.  Dispense: 60 tablet; Refill: 0  -     methocarbamol (ROBAXIN) 750 MG tablet; Take 1 " tablet by mouth At Night As Needed for Muscle Spasms.  Dispense: 30 tablet; Refill: 0  -     XR Hip With or Without Pelvis 2 - 3 View Right; Future    3. History of total left hip replacement  -     diclofenac (VOLTAREN) 75 MG EC tablet; Take 1 tablet by mouth 2 (Two) Times a Day.  Dispense: 60 tablet; Refill: 0  -     methocarbamol (ROBAXIN) 750 MG tablet; Take 1 tablet by mouth At Night As Needed for Muscle Spasms.  Dispense: 30 tablet; Refill: 0  -     XR Hip With or Without Pelvis 2 - 3 View Right; Future    Patient had a stroke about a year ago that affected his right side and his speech.  He only got a couple of weeks of pain relief with intra-articular knee injection last visit.  It is possible that some of the symptoms he is having in his right thigh and in his knee are related to his previous stroke and is possible that some of this is centrally mediated.  However he has also had a prior left hip replacement and with the pain more up into his right thigh today and concerned that he could have underlying right hip arthritis.  He does have somewhat limited external rotation of his hip.  I ordered some x-rays to look further at his hip.  He has been taking ibuprofen 800 mg but I want to have him switch to diclofenac twice a day and see if that improves his symptoms any.  I am also adding on a muscle relaxer that he can use at night.  Gabapentin would also be considered in the future and this may do a better job at helping if this is centrally mediated or more neuropathic in nature.  We also briefly discussed gel injections.  It sounds like he had these at some point in the past and did not get much benefit.  However it would be another nonoperative option for him.    He did have a popliteal cyst seen on ultrasound in September 2024.  The popliteal fossa did not seem to be the primary focus of his pain, but targeted injection therapy for this could also be considered in the future.      Diclofenac  prescription  Methocarbamol prescription  Right hip x-rays  Follow up: 3 to 4 weeks or sooner pending hip x-rays        Patient or patient representative verbalized consent for the use of Ambient Listening during the visit with  Pedro Oquendo MD for chart documentation. 3/31/2025  17:05 CDT    Pedro Oquendo MD, PhD

## 2025-04-02 ENCOUNTER — HOSPITAL ENCOUNTER (OUTPATIENT)
Dept: GENERAL RADIOLOGY | Facility: HOSPITAL | Age: 70
Discharge: HOME | End: 2025-04-02
Admitting: STUDENT IN AN ORGANIZED HEALTH CARE EDUCATION/TRAINING PROGRAM
Payer: MEDICARE

## 2025-04-02 ENCOUNTER — RESULTS FOLLOW-UP (OUTPATIENT)
Age: 70
End: 2025-04-02
Payer: MEDICARE

## 2025-04-02 ENCOUNTER — HOSPITAL ENCOUNTER (OUTPATIENT)
Facility: HOSPITAL | Age: 70
Setting detail: THERAPIES SERIES
Discharge: HOME | End: 2025-04-02
Payer: MEDICARE

## 2025-04-02 DIAGNOSIS — M17.11 PRIMARY OSTEOARTHRITIS OF RIGHT KNEE: Primary | ICD-10-CM

## 2025-04-02 DIAGNOSIS — R48.2 APRAXIA: Primary | ICD-10-CM

## 2025-04-02 DIAGNOSIS — M17.11 PRIMARY OSTEOARTHRITIS OF RIGHT KNEE: ICD-10-CM

## 2025-04-02 DIAGNOSIS — Z96.642 HISTORY OF TOTAL LEFT HIP REPLACEMENT: ICD-10-CM

## 2025-04-02 DIAGNOSIS — R47.01 APHASIA: ICD-10-CM

## 2025-04-02 DIAGNOSIS — M16.11 PRIMARY OSTEOARTHRITIS OF RIGHT HIP: ICD-10-CM

## 2025-04-02 DIAGNOSIS — M79.651 RIGHT THIGH PAIN: ICD-10-CM

## 2025-04-02 PROCEDURE — 73502 X-RAY EXAM HIP UNI 2-3 VIEWS: CPT

## 2025-04-02 PROCEDURE — 92507 TX SP LANG VOICE COMM INDIV: CPT | Performed by: SPEECH-LANGUAGE PATHOLOGIST

## 2025-04-02 NOTE — PROGRESS NOTES
Speech Language Pathology Treatment Note  [address] [place] [address]    Patient: Justin Londono                                                                                     Visit Date: 2025  :     1955    Referring practitioner:    Maite Dodd PA-C  Date of Initial Visit:       2024          Visit Diagnoses:    ICD-10-CM ICD-9-CM   1. Apraxia  R48.2 784.69   2. Aphasia  R47.01 784.3         SUBJECTIVE     Patient arrived alert and ready for therapy today.    Pain: Patient did not express any pain today.   OBJECTIVE   GOALS    Goals                                         STG Comments Status   Patient will improve verbal expressive language skills by completing automatic speech tasks, naming objects/pictures, and completing open-ended phrases/sentences With naming/writing single words task, with typing on keyboard w/o predictive text, from given pictures (T= 54): patient was able to name 29 pictures (51%) and repeat 21/25 of the remaining words (84 %).    Ongoing       Patient will improve comprehension of spoken language by following 1 step then two step directions presented verbally and/or in writing Patient demonstrated understanding of task instructions and conversation. Following directions not directly addressed today.   Ongoing   Patient will improve comprehension and expression of written language skills by complete functional reading and writing tasks With naming/writing task, typing on keyboard w/o predictive text, patient was able to type 45/54 words (83 %) and write with cues or copy the remaining 9 words correctly. Cues ranged from minimal (semantic cue, giving first letter) to direct copy.   Ongoing   Patient will demonstrate understanding of and use AAC device to augment communication and express wants and needs Patient is using SGD at home to aid in communication. Demonstrated use of  keyboard correctly. Was not given access to predictive text during writing activity today to better assess spelling.   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 Repeating simple words declined somewhat today: However, writing simple single words improving.  Ongoing   Patient will comprehend basic spoken and written information presented in home environment Patient demonstrated understanding of spoken information in context.  Ongoing     Therapy Education/Self Care    Details: POC   Given Home Exercise Program   Progress: Reinforced   Education provided to:  Patient   Level of understanding Demonstrated           [address] Speech/Language/Cognitive Treatment,   Total Time of Visit:            55  mins             ASSESSMENT/PLAN     ASSESSMENT: Patient  continues to make good progress. Improved spelling today. Able to communicate simple single words by speaking 51 %, which was a slight decline today.  Significant communication deficit remains.     PLAN: Continue therapy  Progress Note Due Date: 4/2/2025  Recertification Due Date: 6/2/2025        Signature:  Zabrina Bhat CCC-SLP, [place] License #: [id number]  Electronically signed on 4/2/2025           [facility] [place] [address]

## 2025-04-03 ENCOUNTER — APPOINTMENT (OUTPATIENT)
Facility: HOSPITAL | Age: 70
End: 2025-04-03
Payer: MEDICARE

## 2025-04-03 NOTE — TELEPHONE ENCOUNTER
Patient aware of results.He would like a referral to Dr. Perry but would also like to schedule an appt for right hip ultrasound guided injection due to Dr. Perry's long appt wait time. Referral has been placed and appt has been made for hip injection.

## 2025-04-09 ENCOUNTER — HOSPITAL ENCOUNTER (OUTPATIENT)
Facility: HOSPITAL | Age: 70
Setting detail: THERAPIES SERIES
Discharge: HOME | End: 2025-04-09
Payer: MEDICARE

## 2025-04-09 DIAGNOSIS — R47.01 APHASIA: ICD-10-CM

## 2025-04-09 DIAGNOSIS — R48.2 APRAXIA: Primary | ICD-10-CM

## 2025-04-09 PROCEDURE — 92507 TX SP LANG VOICE COMM INDIV: CPT | Performed by: SPEECH-LANGUAGE PATHOLOGIST

## 2025-04-09 NOTE — PROGRESS NOTES
Speech Language Pathology Treatment Note  [address] [place] [address]    Patient: Justin Londono                                                                                     Visit Date: 2025  :     1955    Referring practitioner:    Maite Dodd PA-C  Date of Initial Visit:       2024          Visit Diagnoses:    ICD-10-CM ICD-9-CM   1. Apraxia  R48.2 784.69   2. Aphasia  R47.01 784.3           SUBJECTIVE     Patient arrived alert and ready for therapy today.    Pain: Patient noted pain in his leg however did not rate  OBJECTIVE   GOALS    Goals                                         STG Comments Status   Patient will improve verbal expressive language skills by completing automatic speech tasks, naming objects/pictures, and completing open-ended phrases/sentences With color picture scenes of people engaged in various activities patient was able to talk about each picture with 4-8 correct information units, average 6.2.  Significant word finding, especially with nouns, continues.  Patient noted to switch pronouns at times.   Ongoing       Patient will improve comprehension of spoken language by following 1 step then two step directions presented verbally and/or in writing Patient demonstrated understanding of task instructions and conversation. Following directions not directly addressed today.   Ongoing   Patient will improve comprehension and expression of written language skills by complete functional reading and writing tasks Reading/writing not directly addressed today.  Previous: With naming/writing task, typing on keyboard w/o predictive text, patient was able to type 45/54 words (83 %) and write with cues or copy the remaining 9 words correctly. Cues ranged from minimal (semantic cue, giving first letter) to direct copy.   Ongoing   Patient will demonstrate understanding of and use AAC device to  augment communication and express wants and needs Patient is using SGD at home to aid in communication.  Not utilized today.   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 Repeating simple words declined somewhat today: However, writing simple single words improving.  Ongoing   Patient will comprehend basic spoken and written information presented in home environment Patient demonstrated understanding of spoken information in context.  Ongoing     Therapy Education/Self Care    Details: POC   Given Home Exercise Program   Progress: Reinforced   Education provided to:  Patient   Level of understanding Demonstrated           [id number] Speech/Language/Cognitive Treatment,   Total Time of Visit:            45 mins             ASSESSMENT/PLAN     ASSESSMENT: Patient  continues to make good progress.  He was able to demonstrate verbal expression with picture description with an average of 6.2 correct information units per picture.    PLAN: Continue therapy  Progress Note Due Date: 4/2/2025  Recertification Due Date: 6/2/2025        Signature:  Zabrina Bhat CCC-SLP, [place] License #: [id number]  Electronically signed on 4/9/2025           [facility] [place] [address]

## 2025-04-14 ENCOUNTER — OFFICE VISIT (OUTPATIENT)
Dept: NEUROSURGERY | Facility: CLINIC | Age: 70
End: 2025-04-14
Payer: MEDICARE

## 2025-04-14 ENCOUNTER — OFFICE VISIT (OUTPATIENT)
Age: 70
End: 2025-04-14
Payer: MEDICARE

## 2025-04-14 VITALS — WEIGHT: 227.4 LBS | HEIGHT: 69 IN | BODY MASS INDEX: 33.68 KG/M2

## 2025-04-14 VITALS — WEIGHT: 227 LBS | HEIGHT: 69 IN | BODY MASS INDEX: 33.62 KG/M2

## 2025-04-14 DIAGNOSIS — Z78.9 NON-SMOKER: ICD-10-CM

## 2025-04-14 DIAGNOSIS — M50.30 DDD (DEGENERATIVE DISC DISEASE), CERVICAL: Primary | ICD-10-CM

## 2025-04-14 DIAGNOSIS — E66.811 CLASS 1 OBESITY DUE TO EXCESS CALORIES WITH SERIOUS COMORBIDITY AND BODY MASS INDEX (BMI) OF 33.0 TO 33.9 IN ADULT: ICD-10-CM

## 2025-04-14 DIAGNOSIS — E66.09 CLASS 1 OBESITY DUE TO EXCESS CALORIES WITH SERIOUS COMORBIDITY AND BODY MASS INDEX (BMI) OF 33.0 TO 33.9 IN ADULT: ICD-10-CM

## 2025-04-14 DIAGNOSIS — M16.11 PRIMARY OSTEOARTHRITIS OF RIGHT HIP: Primary | ICD-10-CM

## 2025-04-14 PROCEDURE — 1159F MED LIST DOCD IN RCRD: CPT | Performed by: STUDENT IN AN ORGANIZED HEALTH CARE EDUCATION/TRAINING PROGRAM

## 2025-04-14 PROCEDURE — 20611 DRAIN/INJ JOINT/BURSA W/US: CPT | Performed by: STUDENT IN AN ORGANIZED HEALTH CARE EDUCATION/TRAINING PROGRAM

## 2025-04-14 PROCEDURE — 1160F RVW MEDS BY RX/DR IN RCRD: CPT | Performed by: STUDENT IN AN ORGANIZED HEALTH CARE EDUCATION/TRAINING PROGRAM

## 2025-04-14 RX ORDER — LIDOCAINE HYDROCHLORIDE 10 MG/ML
2 INJECTION, SOLUTION INFILTRATION; PERINEURAL ONCE
Status: COMPLETED | OUTPATIENT
Start: 2025-04-14 | End: 2025-04-14

## 2025-04-14 RX ORDER — TRAMADOL HYDROCHLORIDE 50 MG/1
50 TABLET ORAL EVERY 4 HOURS PRN
Qty: 30 TABLET | Refills: 0 | Status: CANCELLED | OUTPATIENT
Start: 2025-04-14

## 2025-04-14 RX ORDER — TRIAMCINOLONE ACETONIDE 40 MG/ML
40 INJECTION, SUSPENSION INTRA-ARTICULAR; INTRAMUSCULAR ONCE
Status: COMPLETED | OUTPATIENT
Start: 2025-04-14 | End: 2025-04-14

## 2025-04-14 RX ADMIN — LIDOCAINE HYDROCHLORIDE 2 ML: 10 INJECTION, SOLUTION INFILTRATION; PERINEURAL at 14:29

## 2025-04-14 RX ADMIN — TRIAMCINOLONE ACETONIDE 40 MG: 40 INJECTION, SUSPENSION INTRA-ARTICULAR; INTRAMUSCULAR at 14:30

## 2025-04-14 NOTE — PATIENT INSTRUCTIONS
Continue to avoid aggravating activities  No heavy lifting.  Call office for sooner appointment if you develop any significant pain, weakness or other issues or concerns

## 2025-04-14 NOTE — PROGRESS NOTES
"    Chief complaint:   Chief Complaint   Patient presents with    Follow-up     Pt reports to office for symptom check- expressive aphasia- states he is doing okay, just having right knee pain        Subjective     HPI: Elias comes in today with his wife for recheck.  He is greater than 1 year status post ACDF C3-6.  He has no neck pain to speak of.  He does still have some tingling extending down the right anterior arm.  He continues with some right  weakness.  He is still recovering from his stroke.  Aphasia is slowly improving.  He is having some issues with his hip and is scheduled to see orthopedics later today.    Review of Systems      Objective      Vital Signs  Ht 175.3 cm (69.02\")   Wt 103 kg (227 lb 6.4 oz)   BMI 33.57 kg/m²     Physical Exam  Constitutional:       Appearance: Normal appearance. He is well-developed.   HENT:      Head: Normocephalic.   Eyes:      Pupils: Pupils are equal, round, and reactive to light.   Cardiovascular:      Rate and Rhythm: Normal rate.   Pulmonary:      Effort: Pulmonary effort is normal.   Musculoskeletal:         General: Normal range of motion.      Cervical back: Normal range of motion.   Skin:     General: Skin is warm and dry.   Neurological:      Mental Status: He is oriented to person, place, and time.      GCS: GCS eye subscore is 4. GCS verbal subscore is 5. GCS motor subscore is 6.      Cranial Nerves: No cranial nerve deficit.      Sensory: No sensory deficit.      Motor: Weakness present.      Gait: Gait normal.      Deep Tendon Reflexes: Reflexes are normal and symmetric.   Psychiatric:         Speech: Speech normal.         Behavior: Behavior normal.         Thought Content: Thought content normal.         Neurological Exam  Mental Status  Awake, alert and oriented to person, place and time. Oriented to person, place, and time. Speech is normal.    Cranial Nerves  CN III, IV, VI: Pupils equal round and reactive to light " bilaterally.    Motor  Normal muscle bulk throughout. Strength is 5/5 in all four extremities except as noted.  Right  weakness.    Sensory  Sensation is intact to light touch, pinprick, vibration and proprioception in all four extremities.    Reflexes  Deep tendon reflexes are 2+ and symmetric in all four extremities.    Gait   Normal gait.Casual gait is normal including stance, stride, and arm swing.       Imaging review: No new images        Assessment/Plan: Justin is doing well at greater than 1 year status post three-level ACDF.  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  He continues with right  weakness but is having no pain.    I would like see him back here 1 more time to make sure he is still doing well from a pain standpoint.  We will get cervical flexion and extension films prior to that visit and if he is doing well, hopefully released him to follow-up on an as-needed basis.  Will put him on Dr. Salvador to schedule.    They will call for sooner appointment if he has any pain or other issues/concerns in the interim.    Patient is a nonsmoker    The patient's Body mass index is 33.57 kg/m².. BMI is above normal parameters. Recommendations include: educational material    ADVANCED CARE PLANNING  Information on advance directives provided in AVS.    ESPERANZA Fall Risk Assessment was completed, and patient is at LOW risk for falls.Assessment completed on:1/29/2025     Diagnoses and all orders for this visit:    1. DDD (degenerative disc disease), cervical (Primary)  -     XR spine cervical ap and lat w flex and ext; Future    2. Non-smoker    3. Class 1 obesity due to excess calories with serious comorbidity and body mass index (BMI) of 33.0 to 33.9 in adult    I spent 23 minutes caring for Justin on this date of service. This time includes time spent by me in the following activities: preparing for the visit, reviewing tests, obtaining and/or reviewing a  separately obtained history, performing a medically appropriate examination and/or evaluation, counseling and educating the patient/family/caregiver, ordering medications, tests, or procedures, referring and communicating with other health care professionals, documenting information in the medical record, independently interpreting results and communicating that information with the patient/family/caregiver, and care coordination.      I discussed the patients findings and my recommendations with patient  Maite Dodd PA-C  04/14/25  14:55 CDT

## 2025-04-14 NOTE — PROGRESS NOTES
"  [facility] & Sports [facility]  Pedro Oquendo MD, PhD  Wojciech Oquendo PA-C    CHIEF COMPLAINT  Follow-up of the Right Hip (Patient presents to the office today for right hip pain. Xrays performed at [facility] on 04/02/2025. Patient is here today for an ultrasound-guided hip injection.)       HISTORY OF PRESENT ILLNESS  Patient returns today for ultrasound-guided injection of the right hip joint.  History of Present Illness        HISTORY    Current Outpatient Medications   Medication Instructions    aspirin 81 MG EC tablet 1 tablet, Daily    atorvastatin (LIPITOR) 80 mg, Oral, Nightly    diclofenac (VOLTAREN) 75 mg, Oral, 2 Times Daily    losartan (COZAAR) 50 mg, Oral, Daily    methocarbamol (ROBAXIN) 750 mg, Oral, Nightly PRN         reports that he quit smoking about 35 years ago. His smoking use included cigarettes. He started smoking about 45 years ago. He has a 2.5 pack-year smoking history. He has been exposed to tobacco smoke. He has never used smokeless tobacco. He reports current alcohol use. He reports that he does not currently use drugs.    Past Medical History:   Diagnosis Date    Bleeding tendency     Hyperlipidemia     Stroke         Past Surgical History:   Procedure Laterality Date    CERVICAL DISC ARTHROPLASTY      2/01/2024    COLONOSCOPY      2014?  polyps    THROMBECTOMY  02/01/2024    \"clot removed from brain\"    TOTAL HIP ARTHROPLASTY      2022        PHYSICAL EXAM  Constitutional: The patient is in no apparent distress and generally well-appearing. The patient hears me clearly and answers questions appropriately.   Musculoskeletal:  Right hip:  Pain with logrolling  No overlying skin changes  Physical Exam        IMAGING    XR Hip With or Without Pelvis 2 - 3 View Right  Result Date: 4/2/2025  Narrative: EXAMINATION: XR HIP W OR WO PELVIS 2-3 VIEW RIGHT-  4/2/2025 12:51 PM  HISTORY: Right hip and thigh pain; M17.11-Unilateral primary osteoarthritis, right knee; M79.651-Pain " in right thigh; Z96.642-Presence of left artificial hip joint  Pelvis and RIGHT hip, 3 views.  Images are stored in PACS per institutional protocol.  Intact pelvic ring. Symmetric SI joints.  Normal appearance of the LEFT hip prosthesis.  Prominent degenerative narrowing of the superior RIGHT hip joint space.  Acetabular spurring.  No fracture or dislocation.      Impression: 1. Prominent degenerative narrowing of the superior joint space at the RIGHT hip.    This report was signed and finalized on 4/2/2025 3:37 PM by Dr. Keenan Howell MD.         Results         ASSESSMENT & PLAN  Diagnoses and all orders for this visit:    1. Primary osteoarthritis of right hip (Primary)  -     lidocaine (XYLOCAINE) 1 % injection 2 mL  -     triamcinolone acetonide (KENALOG-40) injection 40 mg  -     lidocaine (XYLOCAINE) 1 % injection 2 mL       Patient has made an appointment with Dr. Perry to discuss surgical intervention for his right hip, but that appointment is not until June or July and he would like to try something in the meantime.  We discussed the risks and benefits of steroid injection of the right hip and he would like to proceed.  Assessment & Plan      Ultrasound-guided injection of the right hip  Follow up: As needed    Procedure Note  Ultrasound-guided hip injection: right   Performed by: Pedro Oquendo MD  Indication: hip arthritis    Ultrasound visualization of needle placement was required to ensure proper placement of medication and to avoid major blood vessels. Verbal consent was obtained and risks and benefits of procedure were discussed, including risk for infection, steroid flare, bleeding, damage to surrounding structures. Focused ultrasound evaluation performed of the anterior hip, and images in multiple planes through the specific area of concern were labeled and saved. Anterior femoroacetabular joint showed degenerative changes and normal amount of fluid at joint capsule. Patient was placed in the supine  position with hip slightly abducted and externally rotated. Area was prepped with chlorhexidine and alcohol swab and aseptic technique was utilized, including sterile gloves and sterile ultrasound gel. Using the [employer]urance e ultrasound machine with curvilinear probe, the affected hip joint at the femoral head/neck junction was visualized and a best approach needle trajectory was identified. Avoidance of vessels and nerves was assured using the Doppler option. A 25g 1.5 inch needle was used to anesthetize the skin and overlying soft tissues with 2mL 1% lidocaine. The hip joint was then injected under direct US guidance using in-plane approach with 2 ml of 1% lidocaine and 1 ml of 40 mg/ml Kenalog using a 22 G 3 1?2 inches needle. The needle was then safely withdrawn and a bandage applied. Patient tolerated the procedure well and will follow up as needed. Post-injection expectations were reviewed and return precautions given.      Patient or patient representative verbalized consent for the use of Ambient Listening during the visit with  Pedro Oquendo MD for chart documentation. 4/14/2025  16:32 CDT    Pedro Oquendo MD, PhD  [unfilled]

## 2025-04-16 ENCOUNTER — HOSPITAL ENCOUNTER (OUTPATIENT)
Facility: HOSPITAL | Age: 70
Setting detail: THERAPIES SERIES
Discharge: HOME | End: 2025-04-16
Payer: MEDICARE

## 2025-04-16 DIAGNOSIS — R48.2 APRAXIA: Primary | ICD-10-CM

## 2025-04-16 DIAGNOSIS — R47.01 APHASIA: ICD-10-CM

## 2025-04-16 PROCEDURE — 92507 TX SP LANG VOICE COMM INDIV: CPT | Performed by: SPEECH-LANGUAGE PATHOLOGIST

## 2025-04-16 NOTE — PROGRESS NOTES
Speech Language Pathology Treatment Note  [address] [place] [address]    Patient: Justin Londono                                                                                     Visit Date: 2025  :     1955    Referring practitioner:    Maite Dodd PA-C  Date of Initial Visit:       2024          Visit Diagnoses:    ICD-10-CM ICD-9-CM   1. Apraxia  R48.2 784.69   2. Aphasia  R47.01 784.3           SUBJECTIVE     Patient arrived alert and ready for therapy today.    Pain: Patient noted pain in his leg however did not rate  OBJECTIVE   GOALS    Goals                                         STG Comments Status   Patient will improve verbal expressive language skills by completing automatic speech tasks, naming objects/pictures, and completing open-ended phrases/sentences Automatic speech continues. With single object pictures, he demonstrated improved naming and repetition. Oral groping continues, he uses self cuing and benefits from visual and auditory cues as well as semantic cues.    Ongoing       Patient will improve comprehension of spoken language by following 1 step then two step directions presented verbally and/or in writing Patient demonstrated understanding of task instructions and conversation. Following directions not directly addressed today.  He stated that he can read and understand but cannot make his mouth say the words.  Ongoing   Patient will improve comprehension and expression of written language skills by complete functional reading and writing tasks Reading/writing not directly addressed today.  Previous: With naming/writing task, typing on keyboard w/o predictive text, patient was able to type 45/54 words (83 %) and write with cues or copy the remaining 9 words correctly. Cues ranged from minimal (semantic cue, giving first letter) to direct copy.   Ongoing   Patient will demonstrate  understanding of and use AAC device to augment communication and express wants and needs Patient is using SGD at home to aid in communication.  Not utilized today.   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 Naming and repeating is improving.  Ongoing   Patient will comprehend basic spoken and written information presented in home environment Patient demonstrated understanding of spoken information in context.  Ongoing     Therapy Education/Self Care    Details: POC   Given Home Exercise Program   Progress: Reinforced   Education provided to:  Patient   Level of understanding Demonstrated           [address] Speech/Language/Cognitive Treatment,   Total Time of Visit:            45 mins             ASSESSMENT/PLAN     ASSESSMENT: Patient  continues to make good progress. Naming of single syllable and two syllable words improved today. He is making closer approximations. He continues to have difficulty with placement and voicing.     PLAN: Continue therapy    Progress Note Due Date: 5/2/2025  Recertification Due Date: 6/2/2025        Signature:  Zabrina Bhat CCC-SLP, [place] License #: [id number]  Electronically signed on 4/16/2025           [facility] [place] [address]

## 2025-04-22 ENCOUNTER — ANESTHESIA EVENT (OUTPATIENT)
Dept: GASTROENTEROLOGY | Facility: HOSPITAL | Age: 70
End: 2025-04-22
Payer: MEDICARE

## 2025-04-22 ENCOUNTER — ANESTHESIA (OUTPATIENT)
Dept: GASTROENTEROLOGY | Facility: HOSPITAL | Age: 70
End: 2025-04-22
Payer: MEDICARE

## 2025-04-22 ENCOUNTER — HOSPITAL ENCOUNTER (OUTPATIENT)
Facility: HOSPITAL | Age: 70
Setting detail: HOSPITAL OUTPATIENT SURGERY
Discharge: HOME | End: 2025-04-22
Attending: INTERNAL MEDICINE | Admitting: INTERNAL MEDICINE
Payer: MEDICARE

## 2025-04-22 VITALS
OXYGEN SATURATION: 100 % | SYSTOLIC BLOOD PRESSURE: 155 MMHG | HEIGHT: 69 IN | RESPIRATION RATE: 20 BRPM | HEART RATE: 55 BPM | DIASTOLIC BLOOD PRESSURE: 81 MMHG | TEMPERATURE: 97.9 F | WEIGHT: 222 LBS | BODY MASS INDEX: 32.88 KG/M2

## 2025-04-22 PROCEDURE — 25810000003 SODIUM CHLORIDE 0.9 % SOLUTION: Performed by: ANESTHESIOLOGY

## 2025-04-22 PROCEDURE — 25010000002 PROPOFOL 10 MG/ML EMULSION: Performed by: NURSE ANESTHETIST, CERTIFIED REGISTERED

## 2025-04-22 PROCEDURE — 25010000002 LIDOCAINE PF 2% 2 % SOLUTION: Performed by: NURSE ANESTHETIST, CERTIFIED REGISTERED

## 2025-04-22 RX ORDER — LIDOCAINE HYDROCHLORIDE 20 MG/ML
INJECTION, SOLUTION EPIDURAL; INFILTRATION; INTRACAUDAL; PERINEURAL AS NEEDED
Status: DISCONTINUED | OUTPATIENT
Start: 2025-04-22 | End: 2025-04-22 | Stop reason: SURG

## 2025-04-22 RX ORDER — SODIUM CHLORIDE 9 MG/ML
500 INJECTION, SOLUTION INTRAVENOUS ONCE
Status: COMPLETED | OUTPATIENT
Start: 2025-04-22 | End: 2025-04-22

## 2025-04-22 RX ORDER — SODIUM CHLORIDE 0.9 % (FLUSH) 0.9 %
10 SYRINGE (ML) INJECTION AS NEEDED
Status: DISCONTINUED | OUTPATIENT
Start: 2025-04-22 | End: 2025-04-22 | Stop reason: HOSPADM

## 2025-04-22 RX ORDER — ONDANSETRON 2 MG/ML
4 INJECTION INTRAMUSCULAR; INTRAVENOUS ONCE AS NEEDED
Status: DISCONTINUED | OUTPATIENT
Start: 2025-04-22 | End: 2025-04-22 | Stop reason: HOSPADM

## 2025-04-22 RX ORDER — PROPOFOL 10 MG/ML
VIAL (ML) INTRAVENOUS AS NEEDED
Status: DISCONTINUED | OUTPATIENT
Start: 2025-04-22 | End: 2025-04-22 | Stop reason: SURG

## 2025-04-22 RX ADMIN — SODIUM CHLORIDE 500 ML: 9 INJECTION, SOLUTION INTRAVENOUS at 11:04

## 2025-04-22 RX ADMIN — LIDOCAINE HYDROCHLORIDE 50 MG: 20 INJECTION, SOLUTION EPIDURAL; INFILTRATION; INTRACAUDAL; PERINEURAL at 12:21

## 2025-04-22 RX ADMIN — PROPOFOL 300 MG: 10 INJECTION, EMULSION INTRAVENOUS at 12:21

## 2025-04-22 NOTE — ANESTHESIA POSTPROCEDURE EVALUATION
Patient: Justin Londono    Procedure Summary       Date: 04/22/25 Room / Location: [facility] ENDOSCOPY 2 /  [facility]    Anesthesia Start: 1215 Anesthesia Stop: 1247    Procedure: COLONOSCOPY WITH ANESTHESIA Diagnosis:       Encounter for screening for malignant neoplasm of colon      (Encounter for screening for malignant neoplasm of colon [Z12.11])    Surgeons: Arsenio Jacques MD Provider: Julio Smith CRNA    Anesthesia Type: MAC ASA Status: 3            Anesthesia Type: MAC    Vitals  Vitals Value Taken Time   /62 04/22/25 12:45   Temp     Pulse 64 04/22/25 12:47   Resp 22 04/22/25 12:45   SpO2 100 % 04/22/25 12:47           Post Anesthesia Care and Evaluation    Patient location during evaluation: PHASE II  Patient participation: complete - patient participated  Level of consciousness: awake  Pain management: adequate    Airway patency: patent  Anesthetic complications: No anesthetic complications  Respiratory status: acceptable  Hydration status: acceptable

## 2025-04-22 NOTE — ANESTHESIA PREPROCEDURE EVALUATION
Anesthesia Evaluation     Patient summary reviewed   no history of anesthetic complications:   NPO Solid Status: > 8 hours             Airway   Mallampati: II  Dental      Pulmonary    (+) a smoker Former,  (-) asthma, sleep apnea (listed, patient denies)  Cardiovascular     (+) hypertension, hyperlipidemia      Neuro/Psych  (+) CVA (speech difficulty), poor historian.  GI/Hepatic/Renal/Endo    (+) obesity  (-) liver disease, no renal disease, diabetes    Musculoskeletal     Abdominal    Substance History      OB/GYN          Other                    Anesthesia Plan    ASA 3     MAC     intravenous induction     Anesthetic plan, risks, benefits, and alternatives have been provided, discussed and informed consent has been obtained with: patient.    CODE STATUS:

## 2025-04-24 ENCOUNTER — OFFICE VISIT (OUTPATIENT)
Age: 70
End: 2025-04-24
Payer: MEDICARE

## 2025-04-24 VITALS — BODY MASS INDEX: 33.33 KG/M2 | WEIGHT: 225 LBS | HEIGHT: 69 IN

## 2025-04-24 DIAGNOSIS — G89.29 CHRONIC RIGHT HIP PAIN: ICD-10-CM

## 2025-04-24 DIAGNOSIS — M25.551 CHRONIC RIGHT HIP PAIN: ICD-10-CM

## 2025-04-24 DIAGNOSIS — M16.11 PRIMARY OSTEOARTHRITIS OF RIGHT HIP: Primary | ICD-10-CM

## 2025-04-24 PROCEDURE — G8417 CALC BMI ABV UP PARAM F/U: HCPCS | Performed by: PHYSICIAN ASSISTANT

## 2025-04-24 PROCEDURE — 1123F ACP DISCUSS/DSCN MKR DOCD: CPT | Performed by: PHYSICIAN ASSISTANT

## 2025-04-24 PROCEDURE — 1036F TOBACCO NON-USER: CPT | Performed by: PHYSICIAN ASSISTANT

## 2025-04-24 PROCEDURE — 3017F COLORECTAL CA SCREEN DOC REV: CPT | Performed by: PHYSICIAN ASSISTANT

## 2025-04-24 PROCEDURE — 1160F RVW MEDS BY RX/DR IN RCRD: CPT | Performed by: PHYSICIAN ASSISTANT

## 2025-04-24 PROCEDURE — 99204 OFFICE O/P NEW MOD 45 MIN: CPT | Performed by: PHYSICIAN ASSISTANT

## 2025-04-24 PROCEDURE — G8427 DOCREV CUR MEDS BY ELIG CLIN: HCPCS | Performed by: PHYSICIAN ASSISTANT

## 2025-04-24 PROCEDURE — 1159F MED LIST DOCD IN RCRD: CPT | Performed by: PHYSICIAN ASSISTANT

## 2025-04-24 RX ORDER — ASPIRIN 81 MG/1
81 TABLET ORAL DAILY
COMMUNITY

## 2025-04-24 RX ORDER — ATORVASTATIN CALCIUM 80 MG/1
80 TABLET, FILM COATED ORAL NIGHTLY
COMMUNITY
Start: 2024-08-06

## 2025-04-24 RX ORDER — LOSARTAN POTASSIUM 50 MG/1
50 TABLET ORAL DAILY
COMMUNITY
Start: 2024-12-11

## 2025-04-24 RX ORDER — METHOCARBAMOL 750 MG/1
750 TABLET, FILM COATED ORAL NIGHTLY PRN
COMMUNITY
Start: 2025-03-31

## 2025-04-24 RX ORDER — DICLOFENAC SODIUM 75 MG/1
75 TABLET, DELAYED RELEASE ORAL 2 TIMES DAILY
COMMUNITY
Start: 2025-03-31

## 2025-04-24 NOTE — PROGRESS NOTES
Orthopaedic History and Physical - New Patient    NAME:  Darell Rojo   : 1955  MRN: [id number]      2025     CHIEF COMPLAINT:    Chief Complaint   Patient presents with    RIGHT HIP PAIN     Pt states here for right hip pain / pt states pain has been there for at least 3 years / the pain is more towards the front - groin area and the pain will regulate down the front of leg / pt states the pain is sharp / pt pain level sitting is 3/10 but the pain will increase after a long period of time of walking / pt had injection last month from dr angulo        HISTORY OF PRESENT ILLNESS:   The patient is a 70 y.o. male who presents to the office for evaluation and treatment of right hip/groin pain.   Pain began about 3 years or more ago and was associated without a traumatic event. Pain is described as GS PAIN CHARACTER: sharp and aching, associated with Associated symptoms: loss of motion worse with Pain worse when: walking for a long perior of time.. Treatment has consisted of fluoroscopic right hip injection by Dr. Angulo.  Pain is rated a  3-7 /10 and located on the right hip and groin.  He is here today with his wife.  His gait is antalgic to the right.    Past Medical History:        Diagnosis Date    Stroke (HCC)        Past Surgical History:        Procedure Laterality Date    NECK SURGERY         Current Medications:   Prior to Admission medications    Medication Sig Start Date End Date Taking? Authorizing Provider   atorvastatin (LIPITOR) 80 MG tablet Take 1 tablet by mouth nightly 24  Yes Provider, MD Hiwot   aspirin 81 MG EC tablet Take 1 tablet by mouth daily   Yes Provider, Historical, MD   losartan (COZAAR) 50 MG tablet Take 1 tablet by mouth daily 24  Yes Provider, Historical, MD   methocarbamol (ROBAXIN) 750 MG tablet Take 1 tablet by mouth nightly as needed 3/31/25  Yes Provider, Historical, MD   diclofenac (VOLTAREN) 75 MG EC tablet Take 1 tablet by mouth 2 times daily 3/31/25

## 2025-04-28 DIAGNOSIS — M79.651 RIGHT THIGH PAIN: ICD-10-CM

## 2025-04-28 DIAGNOSIS — M17.11 PRIMARY OSTEOARTHRITIS OF RIGHT KNEE: ICD-10-CM

## 2025-04-28 DIAGNOSIS — Z96.642 HISTORY OF TOTAL LEFT HIP REPLACEMENT: ICD-10-CM

## 2025-04-28 RX ORDER — DICLOFENAC SODIUM 75 MG/1
75 TABLET, DELAYED RELEASE ORAL 2 TIMES DAILY
Qty: 60 TABLET | Refills: 0 | Status: SHIPPED | OUTPATIENT
Start: 2025-04-28

## 2025-04-30 ENCOUNTER — APPOINTMENT (OUTPATIENT)
Facility: HOSPITAL | Age: 70
End: 2025-04-30
Payer: MEDICARE

## 2025-05-01 ENCOUNTER — APPOINTMENT (OUTPATIENT)
Facility: HOSPITAL | Age: 70
End: 2025-05-01
Payer: MEDICARE

## 2025-05-07 ENCOUNTER — HOSPITAL ENCOUNTER (OUTPATIENT)
Facility: HOSPITAL | Age: 70
Setting detail: THERAPIES SERIES
Discharge: HOME | End: 2025-05-07
Payer: MEDICARE

## 2025-05-07 DIAGNOSIS — R47.01 APHASIA: ICD-10-CM

## 2025-05-07 DIAGNOSIS — R48.2 APRAXIA: Primary | ICD-10-CM

## 2025-05-07 PROCEDURE — 92507 TX SP LANG VOICE COMM INDIV: CPT | Performed by: SPEECH-LANGUAGE PATHOLOGIST

## 2025-05-07 NOTE — PROGRESS NOTES
Speech Language Pathology Treatment Note  [address] [place] [address]    Patient: Justin Londono                                                                                     Visit Date: 2025  :     1955    Referring practitioner:    Maite Dodd PA-C  Date of Initial Visit:       2024          Visit Diagnoses:    ICD-10-CM ICD-9-CM   1. Apraxia  R48.2 784.69   2. Aphasia  R47.01 784.3           SUBJECTIVE     Patient arrived alert and ready for therapy today.    Pain: Patient noted pain in his hip however did not rate  OBJECTIVE   GOALS    Goals                                         STG Comments Status   Patient will improve verbal expressive language skills by completing automatic speech tasks, naming objects/pictures, and completing open-ended phrases/sentences Automatic speech continues. He stated that he is having difficulty with saying numbers. He was able to organize numbers and number dots 1-10 and state the numbers in order. Given numbers 1-20 out of order, he needed to use strategy of counting up but was able to state the number words clearly. He was aware of errors and did attempt self correction. With semantic feature sequence, patient was able to produce on repetition CV and CVC syllables by phoneme group. With VC syllables, he benefited from grouping by consonant phoneme group rather than by vowels. (An, in, on, un vs am, ab, ap etc)   Ongoing       Patient will improve comprehension of spoken language by following 1 step then two step directions presented verbally and/or in writing Patient demonstrated understanding of task instructions and conversation, as well as answering questions correctly. Following directions not directly addressed today.   Ongoing   Patient will improve comprehension and expression of written language skills by complete functional reading and writing tasks  Reading/writing not directly addressed today.  Previous: With naming/writing task, typing on keyboard w/o predictive text, patient was able to type 45/54 words (83 %) and write with cues or copy the remaining 9 words correctly. Cues ranged from minimal (semantic cue, giving first letter) to direct copy.   Ongoing   Patient will demonstrate understanding of and use AAC device to augment communication and express wants and needs Patient is using SGD at home to aid in communication as needed.  He did not bring to therapy.   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 Naming and repeating is improving.  Ongoing   Patient will comprehend basic spoken and written information presented in home environment Patient demonstrated understanding of spoken information in context.  Ongoing     Therapy Education/Self Care    Details: POC   Given Home Exercise Program  Semantic feature groups of CV CVC and VC syllables   Progress: Reinforced   Education provided to:  Patient   Level of understanding Demonstrated           [address] Speech/Language/Cognitive Treatment,   Total Time of Visit:            53 mins             ASSESSMENT/PLAN     ASSESSMENT: Patient  continues to make good progress. Syllable repetition improved today. Self monitoring and self correction improving.  He continues to have difficulty with placement and voicing.     PLAN: Continue therapy and home exercises.     Progress Note Due Date: 6/2/2025  Recertification Due Date: 6/2/2025        Signature:  Zabrina Bhat CCC-SLP, [place] License #: [id number]  Electronically signed on 5/7/2025           [place] [address]

## 2025-05-14 ENCOUNTER — HOSPITAL ENCOUNTER (OUTPATIENT)
Facility: HOSPITAL | Age: 70
Setting detail: THERAPIES SERIES
Discharge: HOME | End: 2025-05-14
Payer: MEDICARE

## 2025-05-14 DIAGNOSIS — R48.2 APRAXIA: Primary | ICD-10-CM

## 2025-05-14 DIAGNOSIS — R47.01 APHASIA: ICD-10-CM

## 2025-05-14 PROCEDURE — 92507 TX SP LANG VOICE COMM INDIV: CPT | Performed by: SPEECH-LANGUAGE PATHOLOGIST

## 2025-05-14 PROCEDURE — 92526 ORAL FUNCTION THERAPY: CPT | Performed by: SPEECH-LANGUAGE PATHOLOGIST

## 2025-05-14 NOTE — PROGRESS NOTES
"                                                                              Speech Language Pathology Treatment Note  [address] [place] [address]    Patient: Justin Londono                                                                                     Visit Date: 2025  :     1955    Referring practitioner:    Maite Dodd PA-C  Date of Initial Visit:       2024          Visit Diagnoses:    ICD-10-CM ICD-9-CM   1. Apraxia  R48.2 784.69   2. Aphasia  R47.01 784.3           SUBJECTIVE     Patient arrived alert and ready for therapy today.    Pain: Patient noted pain in his hip however did not rate  OBJECTIVE   GOALS    Goals                                         STG Comments Status   Patient will improve verbal expressive language skills by completing automatic speech tasks, naming objects/pictures, and completing open-ended phrases/sentences Automatic speech continues. He used strategy of counting up to access numbers (3 months till surgery, date of surgery), and was able to say an approximation of \"September\"  He was aware of errors and did attempt self correction. With semantic feature sequence, patient was able to produce on repetition CV and CVC syllables by phoneme group. He had continued difficulty with /d/ and /t/ with k & g substitution. Needed tactile, visual, and verbal cues for producing /d/ in isolation and CV, VC syllables. Drilled tongue elevation to alveolar ridge w/o voicing. Added voicing then sequence of vowels. Difficulty with more tense vowels. Added initial /m/ to vowel /d/ sequence (moo/d, ma/d, mo/d, mu/d). Patient expressed and demonstrated fatigue with this exercise.    Ongoing       Patient will improve comprehension of spoken language by following 1 step then two step directions presented verbally and/or in writing Patient demonstrated understanding of task instructions and conversation, as well as answering questions correctly. Following directions not " directly addressed today.   Ongoing   Patient will improve comprehension and expression of written language skills by complete functional reading and writing tasks Reading/writing not directly addressed today.  Previous: With naming/writing task, typing on keyboard w/o predictive text, patient was able to type 45/54 words (83 %) and write with cues or copy the remaining 9 words correctly. Cues ranged from minimal (semantic cue, giving first letter) to direct copy.   Ongoing   Patient will demonstrate understanding of and use AAC device to augment communication and express wants and needs Patient is using SGD at home to aid in communication as needed.  He did not bring to therapy.   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 Naming and repeating is improving.  Ongoing   Patient will comprehend basic spoken and written information presented in home environment Patient demonstrated understanding of spoken information in context.  Ongoing     Therapy Education/Self Care    Details: POC   Given Home Exercise Program  Semantic feature sequencing with final  /m/ syllables   Progress: Reinforced   Education provided to:  Patient   Level of understanding Demonstrated           03938 Speech/Language/Cognitive Treatment,   Total Time of Visit:            45 mins             ASSESSMENT/PLAN     ASSESSMENT: Patient  continues to make good progress. Drilled on /d/ today. Needed max cues.  He continues to have difficulty with placement and voicing. Fatigue noted with oral drill.     PLAN: Continue therapy and home exercises.     Progress Note Due Date: 6/2/2025  Recertification Due Date: 6/2/2025        Signature:  Zabrina Bhat CCC-SLP, [place] License #: [id number]  Electronically signed on 5/14/2025           [place], [place]  [id number]

## 2025-05-21 ENCOUNTER — HOSPITAL ENCOUNTER (OUTPATIENT)
Facility: HOSPITAL | Age: 70
Setting detail: THERAPIES SERIES
Discharge: HOME | End: 2025-05-21
Payer: MEDICARE

## 2025-05-21 DIAGNOSIS — R48.2 APRAXIA: Primary | ICD-10-CM

## 2025-05-21 DIAGNOSIS — R47.01 APHASIA: ICD-10-CM

## 2025-05-21 PROCEDURE — 92507 TX SP LANG VOICE COMM INDIV: CPT | Performed by: SPEECH-LANGUAGE PATHOLOGIST

## 2025-05-21 NOTE — PROGRESS NOTES
Speech Language Pathology Treatment Note  [address] [place] [address]    Patient: Justin Londono                                                                                     Visit Date: 2025  :     1955    Referring practitioner:    Maite Dodd PA-C  Date of Initial Visit:       2024          Visit Diagnoses:    ICD-10-CM ICD-9-CM   1. Apraxia  R48.2 784.69   2. Aphasia  R47.01 784.3           SUBJECTIVE     Patient arrived alert and ready for therapy today.    Pain: Patient noted pain in his hip however did not rate  OBJECTIVE   GOALS    Goals                                         STG Comments Status   Patient will improve verbal expressive language skills by completing automatic speech tasks, naming objects/pictures, and completing open-ended phrases/sentences Automatic speech continues. With choral repetition and practice, he was able to say 2 word phrases with 70% accuracy. He was able to name 3/15 pictures on the BNT short form.  Ongoing       Patient will improve comprehension of spoken language by following 1 step then two step directions presented verbally and/or in writing Patient needed some repetition and cued for answering verbal questions today. Following directions not directly addressed today.   Ongoing   Patient will improve comprehension and expression of written language skills by complete functional reading and writing tasks Patient was able to complete reading tasks on the BDAE today. He could not read aloud but did read and select the correct answers by pointing. He had difficulty identifying numbers stated in f/4.  Ongoing   Patient will demonstrate understanding of and use AAC device to augment communication and express wants and needs Patient is using SGD at home to aid in communication as needed.  He did not bring to therapy.   Ongoing   LTG by:        Patient will be able to  verbally and or in writing express basic wants and needs in home environment Patient continues to have significant verbal apraxia which negatively affects his ability to communicate verbally. Naming and repeating is improving.  Ongoing   Patient will comprehend basic spoken and written information presented in home environment Patient demonstrated understanding of spoken information in context.  Ongoing     Therapy Education/Self Care    Details: POC   Given Home Exercise Program  Semantic feature sequencing with final  /m/ syllables   Progress: Reinforced   Education provided to:  Patient   Level of understanding Demonstrated           87351 Speech/Language/Cognitive Treatment,   Total Time of Visit:            45 mins             ASSESSMENT/PLAN     ASSESSMENT: Patient  continues to make good progress. Assessed reading with BDAE sections and naming with BNT short form. He continues with severe verbal apraxia as well as aphasia.       PLAN: Continue therapy and home exercises.     Progress Note Due Date: 6/2/2025  Recertification Due Date: 6/2/2025        Signature:  Zabrina Bhat CCC-SLP, [place] License #: [id number]  Electronically signed on 5/21/2025           [place]. [id number]

## 2025-05-28 ENCOUNTER — HOSPITAL ENCOUNTER (OUTPATIENT)
Facility: HOSPITAL | Age: 70
Setting detail: THERAPIES SERIES
Discharge: HOME | End: 2025-05-28
Payer: MEDICARE

## 2025-05-28 DIAGNOSIS — R47.01 APHASIA: ICD-10-CM

## 2025-05-28 DIAGNOSIS — R48.2 APRAXIA: Primary | ICD-10-CM

## 2025-05-28 PROCEDURE — 92507 TX SP LANG VOICE COMM INDIV: CPT | Performed by: SPEECH-LANGUAGE PATHOLOGIST

## 2025-05-28 NOTE — PROGRESS NOTES
Speech Language Pathology Treatment Note  [address] [place] [address]    Patient: Justin Londono                                                                                     Visit Date: 2025  :     1955    Referring practitioner:    Maite Dodd PA-C  Date of Initial Visit:       2024          Visit Diagnoses:    ICD-10-CM ICD-9-CM   1. Apraxia  R48.2 784.69   2. Aphasia  R47.01 784.3           SUBJECTIVE     Patient arrived alert and ready for therapy today.    Pain: Patient noted pain in his hip however did not rate  OBJECTIVE   GOALS    Goals                                         STG Comments Status   Patient will improve verbal expressive language skills by completing automatic speech tasks, naming objects/pictures, and completing open-ended phrases/sentences Automatic speech continues.  Patient was able to name 50/88 line drawings (1-3 syllables).  (56%) presented with minimal cuing (semantic, mouth posture) he was able to repeat 21/28 (75%) of the remaining words. The remaining 7 he did produce at least one correct element. Of the named pictures 33 were single syllable no blends. 8 were single syllable with blends, 8 were 2 syllable w/o blends. Of the repeated cards 11 single syllable w/o blends, 11 single syllable w/ blends, 8 were 2 syllable; 1 w/ blend, and 1 3 syllable (banana). Of the 7 cards he was unable to name 1 was 1 syllable w/o blend, 1 w/blend, and 5 were 2 syllable; 2 w/o and 3w/blend Ongoing       Patient will improve comprehension of spoken language by following 1 step then two step directions presented verbally and/or in writing Patient needed some repetition and cued for answering verbal questions today. Following directions not directly addressed today.   Ongoing   Patient will improve comprehension and expression of written language skills by complete functional reading and  writing tasks Reading not addressed today.  Previous: Patient was able to complete reading tasks on the BDAE today. He could not read aloud but did read and select the correct answers by pointing. He had difficulty identifying numbers stated in f/4.  Ongoing   Patient will demonstrate understanding of and use AAC device to augment communication and express wants and needs Patient is using SGD at home to aid in communication as needed.  He did not bring to therapy.   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 Naming and repeating is improving.  Ongoing   Patient will comprehend basic spoken and written information presented in home environment Patient demonstrated understanding of spoken information in context.  Ongoing     Therapy Education/Self Care    Details: POC   Given Home Exercise Program  Semantic feature sequencing with final  /m/ syllables   Progress: Reinforced   Education provided to:  Patient   Level of understanding Demonstrated           81930 Speech/Language/Cognitive Treatment,   Total Time of Visit:            45 mins             ASSESSMENT/PLAN     ASSESSMENT: Patient  continues to make good progress.  Naming is improving.  He continues with severe verbal apraxia as well as aphasia.       PLAN: Continue therapy and home exercises.     Progress Note Due Date: 6/2/2025  Recertification Due Date: 6/2/2025        Signature:  Zabrina Bhat CCC-SLP, [place] License #: [id number]  Electronically signed on 5/28/2025           [place] [address]

## 2025-05-30 DIAGNOSIS — M17.11 PRIMARY OSTEOARTHRITIS OF RIGHT KNEE: ICD-10-CM

## 2025-05-30 DIAGNOSIS — M79.651 RIGHT THIGH PAIN: ICD-10-CM

## 2025-05-30 DIAGNOSIS — Z96.642 HISTORY OF TOTAL LEFT HIP REPLACEMENT: ICD-10-CM

## 2025-05-30 RX ORDER — DICLOFENAC SODIUM 75 MG/1
75 TABLET, DELAYED RELEASE ORAL 2 TIMES DAILY
Qty: 60 TABLET | Refills: 0 | Status: SHIPPED | OUTPATIENT
Start: 2025-05-30

## 2025-06-04 ENCOUNTER — HOSPITAL ENCOUNTER (OUTPATIENT)
Facility: HOSPITAL | Age: 70
Setting detail: THERAPIES SERIES
Discharge: HOME | End: 2025-06-04
Payer: MEDICARE

## 2025-06-04 DIAGNOSIS — R48.2 APRAXIA: Primary | ICD-10-CM

## 2025-06-04 DIAGNOSIS — R47.01 APHASIA: ICD-10-CM

## 2025-06-04 NOTE — PROGRESS NOTES
Speech Language Pathology Treatment Note  [address] [place] [address]    Patient: Justin Londono                                                                                     Visit Date: 2025  :     1955    Referring practitioner:    Maite Dodd PA-C  Date of Initial Visit:       2024          Visit Diagnoses:    ICD-10-CM ICD-9-CM   1. Apraxia  R48.2 784.69   2. Aphasia  R47.01 784.3           SUBJECTIVE     Patient arrived alert and ready for therapy today.    Pain: Patient did not indicate any pain today  OBJECTIVE   GOALS    Goals                                         STG Comments Status   Patient will improve verbal expressive language skills by completing automatic speech tasks, naming objects/pictures, and completing open-ended phrases/sentences Automatic speech continues.  Able to count 1-30 aloud w/o 1:1. He was able to name numerals 1-30 with mod/max cuing. Able to demonstrate use of counting up strategy.  Ongoing       Patient will improve comprehension of spoken language by following 1 step then two step directions presented verbally and/or in writing Patient needed some repetition in context. Following directions not directly addressed today.   Ongoing   Patient will improve comprehension and expression of written language skills by complete functional reading and writing tasks Patient was able to silently read and match number words to numerals with 100% accuracy. He was not able to write number words given numeral, but was able to copy. Given written number words, he indicated understanding but needed counting and phonemic cues to say the numbers.  Ongoing   Patient will demonstrate understanding of and use AAC device to augment communication and express wants and needs Patient is using SGD at home to aid in communication as needed.  He did not bring to therapy.   Ongoing   LTG by:         Patient will be able to verbally and or in writing express basic wants and needs in home environment Patient continues to have significant verbal apraxia which negatively affects his ability to communicate verbally. Naming and repeating is improving.  Ongoing   Patient will comprehend basic spoken and written information presented in home environment Patient demonstrated understanding of spoken information in context.  Ongoing     Therapy Education/Self Care    Details: POC   Given Home Exercise Program  Semantic feature sequencing with final  /m/ syllables   Progress: Reinforced   Education provided to:  Patient   Level of understanding Demonstrated           73738 Speech/Language/Cognitive Treatment,   Total Time of Visit:            45 mins             ASSESSMENT/PLAN     ASSESSMENT: Patient  continues to make good progress.  Naming is improving. Able to demonstrate reading number words.  He continues with severe verbal apraxia as well as aphasia.       PLAN: Continue therapy and home exercises.     Progress Note Due Date: 7/1/25  Recertification Due Date: 9/1/25        Signature:  Zabrina Bhat CCC-SLP, [place] License #: [id number]  Electronically signed on 6/4/2025       90 Day Recertification  Certification Period: 6/4/2025 through 9/1/2025  I certify that the therapy services are furnished while this patient is under my care.  The services outlined above are required by this patient, and will be reviewed every 90 days.     PHYSICIAN: Maite Dodd PA-C (NPI: [id number])    Signature:___________________________________________DATE: _________    Please sign and return via fax to [phone].   Thank you so much for letting us work with Justin. I appreciate your letting us work with your patients. If you have any questions or concerns, please don't hesitate to contact me.                 [address] [id number]

## 2025-06-11 ENCOUNTER — HOSPITAL ENCOUNTER (OUTPATIENT)
Facility: HOSPITAL | Age: 70
Setting detail: THERAPIES SERIES
Discharge: HOME | End: 2025-06-11
Payer: MEDICARE

## 2025-06-11 DIAGNOSIS — R48.2 APRAXIA: Primary | ICD-10-CM

## 2025-06-11 DIAGNOSIS — R47.01 APHASIA: ICD-10-CM

## 2025-06-11 PROCEDURE — 92507 TX SP LANG VOICE COMM INDIV: CPT | Performed by: SPEECH-LANGUAGE PATHOLOGIST

## 2025-06-11 NOTE — PROGRESS NOTES
"                                                                              Speech Language Pathology Treatment Note  [address] [place] [address]    Patient: Justin Londono                                                                                     Visit Date: 2025  :     1955    Referring practitioner:    Maite Dodd PA-C  Date of Initial Visit:       2024          Visit Diagnoses:    ICD-10-CM ICD-9-CM   1. Apraxia  R48.2 784.69   2. Aphasia  R47.01 784.3           SUBJECTIVE     Patient arrived alert and ready for therapy today.    Pain: Patient did not indicate any pain today  OBJECTIVE   GOALS    Goals                                         STG Comments Status   Patient will improve verbal expressive language skills by completing automatic speech tasks, naming objects/pictures, and completing open-ended phrases/sentences Automatic speech continues.  Again a  able to count 1-30 aloud w/o 1:1. He was able to name numerals 1-30 with mod cuing. 1-12 were easiest, teens hardest.   Ongoing       Patient will improve comprehension of spoken language by following 1 step then two step directions presented verbally and/or in writing Previous: Patient needed some repetition in context. Following directions not directly addressed today.   Ongoing   Patient will improve comprehension and expression of written language skills by complete functional reading and writing tasks Given written questions, patient initially had difficulty comprehending the question \"how many legs does a dog have?\" After given cues/demonstration (counting legs on a dog) he was then able to answer 4/6 number questions. He was unable to state his age but was able to choose from f/3. Gave his birth year as \"1855\".  Ongoing   Patient will demonstrate understanding of and use AAC device to augment communication and express wants and needs Patient is using SGD at home to aid in communication as needed.  He did not " bring to therapy.   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 Naming and repeating is improving.  Ongoing   Patient will comprehend basic spoken and written information presented in home environment Patient demonstrated understanding of spoken information in context.  Ongoing     Therapy Education/Self Care    Details: POC   Given Home Exercise Program  Semantic feature sequencing with final  /m/ syllables   Progress: Reinforced   Education provided to:  Patient   Level of understanding Demonstrated           73659 Speech/Language/Cognitive Treatment,   Total Time of Visit:            45 mins             ASSESSMENT/PLAN     ASSESSMENT: Patient  continues to make good progress.  Naming is improving.   He continues with severe verbal apraxia as well as aphasia.       PLAN: Continue therapy and home exercises.     Progress Note Due Date: 7/1/25  Recertification Due Date: 9/1/25        Signature:  Zabrina Bhat CCC-SLP, [place] License #: [id number]  Electronically signed on 6/11/2025                [place]. [id number]

## 2025-06-18 ENCOUNTER — HOSPITAL ENCOUNTER (OUTPATIENT)
Facility: HOSPITAL | Age: 70
Setting detail: THERAPIES SERIES
Discharge: HOME | End: 2025-06-18
Payer: MEDICARE

## 2025-06-18 DIAGNOSIS — R47.01 APHASIA: ICD-10-CM

## 2025-06-18 DIAGNOSIS — R48.2 APRAXIA: Primary | ICD-10-CM

## 2025-06-18 PROCEDURE — 92507 TX SP LANG VOICE COMM INDIV: CPT | Performed by: SPEECH-LANGUAGE PATHOLOGIST

## 2025-06-18 NOTE — PROGRESS NOTES
Speech Language Pathology Treatment Note  [address] [place] [address]    Patient: Justin Londono                                                                                     Visit Date: 2025  :     1955    Referring practitioner:    Maite Dodd PA-C  Date of Initial Visit:       2024          Visit Diagnoses:    ICD-10-CM ICD-9-CM   1. Apraxia  R48.2 784.69   2. Aphasia  R47.01 784.3           SUBJECTIVE     Patient arrived alert and ready for therapy today.    Pain: Patient did not indicate any pain today  OBJECTIVE   GOALS    Goals                                         STG Comments Status   Patient will improve verbal expressive language skills by completing automatic speech tasks, naming objects/pictures, and completing open-ended phrases/sentences Automatic speech continues.  He was able to name numerals and written number words with self cuing. He was able to name number of dots on a  card (ie dice dots) without cues. He was able to name single syllable words without blends with 80% accuracy. Difficulty with more complex words.  Ongoing       Patient will improve comprehension of spoken language by following 1 step then two step directions presented verbally and/or in writing Patient had difficulty following written 2 element directions today (eg [place] the animal, underline the food etc).  Ongoing   Patient will improve comprehension and expression of written language skills by complete functional reading and writing tasks Patient was able to identify words by category but unable to produce written words to complete or name category.   Ongoing   Patient will demonstrate understanding of and use AAC device to augment communication and express wants and needs Patient is using SGD at home to aid in communication as needed.  He did not bring to therapy.   Ongoing   LTG by:        Patient will be  able to verbally and or in writing express basic wants and needs in home environment Patient continues to have significant verbal apraxia which negatively affects his ability to communicate verbally. Naming and repeating is improving.  Ongoing   Patient will comprehend basic spoken and written information presented in home environment Patient demonstrated understanding of spoken information in context.  Ongoing     Therapy Education/Self Care    Details: POC   Given Home Exercise Program  Semantic feature sequencing with final  /m/ syllables   Progress: Reinforced   Education provided to:  Patient   Level of understanding Demonstrated           22734 Speech/Language/Cognitive Treatment,   Total Time of Visit:            45 mins             ASSESSMENT/PLAN     ASSESSMENT: Patient  continues to make good progress.  Naming is improving.   He continues with severe verbal apraxia as well as aphasia.       PLAN: Continue therapy and home exercises.     Progress Note Due Date: 7/1/25  Recertification Due Date: 9/1/25        Signature:  Zabrina Bhat, CCC-SLP, [place] License #: [id number]  Electronically signed on 6/18/2025                [facility] [place] [address]

## 2025-06-23 DIAGNOSIS — I10 PRIMARY HYPERTENSION: ICD-10-CM

## 2025-06-23 RX ORDER — LOSARTAN POTASSIUM 50 MG/1
50 TABLET ORAL DAILY
Qty: 90 TABLET | Refills: 1 | Status: SHIPPED | OUTPATIENT
Start: 2025-06-23

## 2025-06-25 ENCOUNTER — HOSPITAL ENCOUNTER (OUTPATIENT)
Facility: HOSPITAL | Age: 70
Setting detail: THERAPIES SERIES
Discharge: HOME | End: 2025-06-25
Payer: MEDICARE

## 2025-06-25 DIAGNOSIS — R48.2 APRAXIA: Primary | ICD-10-CM

## 2025-06-25 DIAGNOSIS — R47.01 APHASIA: ICD-10-CM

## 2025-06-25 PROCEDURE — 92507 TX SP LANG VOICE COMM INDIV: CPT | Performed by: SPEECH-LANGUAGE PATHOLOGIST

## 2025-06-25 NOTE — PROGRESS NOTES
Speech Language Pathology Treatment Note  [address] [place] [address]    Patient: Justin Londono                                                                                     Visit Date: 2025  :     1955    Referring practitioner:    Maite Dodd PA-C  Date of Initial Visit:       2024          Visit Diagnoses:    ICD-10-CM ICD-9-CM   1. Apraxia  R48.2 784.69   2. Aphasia  R47.01 784.3           SUBJECTIVE     Patient arrived alert and ready for therapy today.    Pain: Patient did not indicate any pain today  OBJECTIVE   GOALS    Goals                                         STG Comments Status   Patient will improve verbal expressive language skills by completing automatic speech tasks, naming objects/pictures, and completing open-ended phrases/sentences Automatic speech continues.  He was able to name numerals and written number words with self cuing. Introduced stating similarity between 2 common objects able to initiate after model and cues. Ongoing       Patient will improve comprehension of spoken language by following 1 step then two step directions presented verbally and/or in writing Given short (CT level 1) paragraphs patient was able to answer written questions in F/3 with 80% accuracy.  Increasing paragraphs to level 2 patient was able to answer written questions in F/3 with 70% accuracy.  Difficult to determine if he was reading the questions or simply choosing from the 3 answer choices. Ongoing   Patient will improve comprehension and expression of written language skills by complete functional reading and writing tasks Previous: Patient was able to identify words by category but unable to produce written words to complete or name category.   Ongoing   Patient will demonstrate understanding of and use AAC device to augment communication and express wants and needs Patient is using SGD at  home to aid in communication as needed.  He did not bring to therapy.   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 Naming and repeating is improving.  Worked on stating numbers and generating words for similarities. Ongoing   Patient will comprehend basic spoken and written information presented in home environment Patient demonstrated understanding of spoken information in context.  Ongoing     Therapy Education/Self Care    Details: POC   Given Home Exercise Program  Semantic feature sequencing with final  /m/ syllables   Progress: Reinforced   Education provided to:  Patient   Level of understanding Demonstrated           22823 Speech/Language/Cognitive Treatment,   Total Time of Visit:            45 mins             ASSESSMENT/PLAN     ASSESSMENT: Patient  continues to make good progress.  Naming is improving.   He continues with severe verbal apraxia as well as aphasia.  Auditory comprehension difficult to determine due to difficulty with expression.      PLAN: Continue therapy and home exercises.     Progress Note Due Date: 7/1/25  Recertification Due Date: 9/1/25        Signature:  Zabrina Bhat CCC-SLP, [place] License #: [id number]  Electronically signed on 6/25/2025                [address] [place] [address] [address]. [id number]

## 2025-07-02 ENCOUNTER — HOSPITAL ENCOUNTER (OUTPATIENT)
Facility: HOSPITAL | Age: 70
Setting detail: THERAPIES SERIES
Discharge: HOME | End: 2025-07-02
Payer: MEDICARE

## 2025-07-02 DIAGNOSIS — R48.2 APRAXIA: Primary | ICD-10-CM

## 2025-07-02 DIAGNOSIS — R47.01 APHASIA: ICD-10-CM

## 2025-07-02 PROCEDURE — 92507 TX SP LANG VOICE COMM INDIV: CPT | Performed by: SPEECH-LANGUAGE PATHOLOGIST

## 2025-07-02 NOTE — PROGRESS NOTES
Speech Language Pathology Treatment Note and 30 Day Progress Note  [address] [place] [address]    Patient: Justin Londono                                                                                     Visit Date: 2025  :     1955    Referring practitioner:    Maite Dodd PA-C  Date of Initial Visit:       2024          Visit Diagnoses:    ICD-10-CM ICD-9-CM   1. Apraxia  R48.2 784.69   2. Aphasia  R47.01 784.3           SUBJECTIVE     Patient arrived alert and ready for therapy today.    Pain: Patient did not indicate any pain today  OBJECTIVE   GOALS    Goals                                         STG Comments Status   Patient will improve verbal expressive language skills by completing automatic speech tasks, naming objects/pictures, and completing open-ended phrases/sentences Automatic speech continues.  He was able to engage in social greetings.  Continued with stating similarity between 2 common objects.  Improving with modeling cue.  Able to use gestures as well as single words with prompts. Ongoing       Patient will improve comprehension of spoken language by following 1 step then two step directions presented verbally and/or in writing Patient was able to  follow verbal directions in context with cues. Ongoing   Patient will improve comprehension and expression of written language skills by complete functional reading and writing tasks Given short (Tactus level 1) paragraphs patient was able to read and answer written questions in F/3 with 60 % accuracy.  Increased accuracy with focused prompt. Ongoing   Patient will demonstrate understanding of and use AAC device to augment communication and express wants and needs Patient is using SGD at home to aid in communication as needed.  He did not bring to therapy.  He did attempt gestures and finger writing during activities today.  Ongoing   LTG by:         Patient will be able to verbally and or in writing express basic wants and needs in home environment Patient continues to have significant verbal apraxia which negatively affects his ability to communicate verbally. Naming and repeating is improving.  Worked on stating numbers and generating words for similarities. Ongoing   Patient will comprehend basic spoken and written information presented in home environment Patient demonstrated understanding of spoken information in context.  Ongoing     Therapy Education/Self Care    Details: POC   Given Home Exercise Program  Semantic feature sequencing with final  /m/ syllables   Progress: Reinforced   Education provided to:  Patient   Level of understanding Demonstrated           [address]63 Speech/Language/Cognitive Treatment,   Total Time of Visit:            45 mins             ASSESSMENT/PLAN     ASSESSMENT: Patient  continues to make good progress.  Naming is improving.   He continues with severe verbal apraxia as well as aphasia.  Auditory comprehension difficult to determine due to difficulty with expression.      PLAN: Continue therapy and home exercises.     Progress Note Due Date: 8/1/25  Recertification Due Date: 9/1/25        Signature:  Zabrina Bhat CCC-SLP, [place] License #: [id number]  Electronically signed on 7/2/2025                [facility] [place] [address]

## 2025-07-04 DIAGNOSIS — Z96.642 HISTORY OF TOTAL LEFT HIP REPLACEMENT: ICD-10-CM

## 2025-07-04 DIAGNOSIS — M17.11 PRIMARY OSTEOARTHRITIS OF RIGHT KNEE: ICD-10-CM

## 2025-07-04 DIAGNOSIS — M79.651 RIGHT THIGH PAIN: ICD-10-CM

## 2025-07-07 RX ORDER — DICLOFENAC SODIUM 75 MG/1
75 TABLET, DELAYED RELEASE ORAL 2 TIMES DAILY
Qty: 60 TABLET | Refills: 0 | Status: SHIPPED | OUTPATIENT
Start: 2025-07-07

## 2025-07-09 ENCOUNTER — HOSPITAL ENCOUNTER (OUTPATIENT)
Facility: HOSPITAL | Age: 70
Setting detail: THERAPIES SERIES
Discharge: HOME | End: 2025-07-09
Payer: MEDICARE

## 2025-07-09 DIAGNOSIS — R47.01 APHASIA: ICD-10-CM

## 2025-07-09 DIAGNOSIS — R48.2 APRAXIA: Primary | ICD-10-CM

## 2025-07-09 PROCEDURE — 92507 TX SP LANG VOICE COMM INDIV: CPT | Performed by: SPEECH-LANGUAGE PATHOLOGIST

## 2025-07-09 NOTE — PROGRESS NOTES
Speech Language Pathology Treatment Note and 30 Day Progress Note  [address] [place] [address]    Patient: Justin Londono                                                                                     Visit Date: 2025  :     1955    Referring practitioner:    Maite Dodd PA-C  Date of Initial Visit:       2024          Visit Diagnoses:    ICD-10-CM ICD-9-CM   1. Apraxia  R48.2 784.69   2. Aphasia  R47.01 784.3           SUBJECTIVE     Patient arrived alert and ready for therapy today.    Pain: Patient did not indicate any pain today  OBJECTIVE   GOALS    Goals                                         STG Comments Status   Patient will improve verbal expressive language skills by completing automatic speech tasks, naming objects/pictures, and completing open-ended phrases/sentences Automatic speech continues.  He was able to engage in social greetings.  He had more difficulty with stating similarity between 2 common objects, this test was frustrating him today.  He did name 6/10 objects in context of this task.  Given unusual picture scenes (Tactus Advanced Naming describe), patient was able to provide at least 5 correct information units per picture. Ongoing       Patient will improve comprehension of spoken language by following 1 step then two step directions presented verbally and/or in writing Not directly addressed today: Patient was able to  follow verbal directions in context with cues. Ongoing   Patient will improve comprehension and expression of written language skills by complete functional reading and writing tasks Patient was able to complete written phrases with single word from F/4 with 90% accuracy.  Given short  paragraphs (Tactus Advanced Reading level 1, articles) patient was able to read and answer written questions in F/4 with 53 % accuracy.  More difficulty noted today with increased  field of foils. Ongoing   Patient will demonstrate understanding of and use AAC device to augment communication and express wants and needs Patient is using SGD at home to aid in communication as needed.  He did not bring to therapy.  He did attempt gestures and finger writing during activities today.  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 Naming and repeating is improving.  Worked on stating numbers and generating words for similarities. Ongoing   Patient will comprehend basic spoken and written information presented in home environment Patient demonstrated understanding of spoken information in context.  Ongoing     Therapy Education/Self Care    Details: POC   Given Home Exercise Program  Semantic feature sequencing with final  /m/ syllables   Progress: Reinforced   Education provided to:  Patient   Level of understanding Demonstrated           [address] Speech/Language/Cognitive Treatment,   Total Time of Visit:            45 mins             ASSESSMENT/PLAN     ASSESSMENT: Patient  continues to make good progress.  Naming is improving.   He continues with severe verbal apraxia as well as aphasia.  Auditory comprehension difficult to determine due to difficulty with expression.      PLAN: Continue therapy and home exercises.     Progress Note Due Date: 8/1/25  Recertification Due Date: 9/1/25        Signature:  Zabrina Bhat CCC-SLP, [place] License #: [id number]  Electronically signed on 7/9/2025                [place]. [id number]

## 2025-07-16 ENCOUNTER — HOSPITAL ENCOUNTER (OUTPATIENT)
Facility: HOSPITAL | Age: 70
Setting detail: THERAPIES SERIES
Discharge: HOME | End: 2025-07-16
Payer: MEDICARE

## 2025-07-16 DIAGNOSIS — R47.01 APHASIA: ICD-10-CM

## 2025-07-16 DIAGNOSIS — R48.2 APRAXIA: Primary | ICD-10-CM

## 2025-07-16 PROCEDURE — 92507 TX SP LANG VOICE COMM INDIV: CPT | Performed by: SPEECH-LANGUAGE PATHOLOGIST

## 2025-07-16 NOTE — PROGRESS NOTES
Speech Language Pathology Treatment Note and 30 Day Progress Note  [address] [place] [address]    Patient: Justin Londono                                                                                     Visit Date: 2025  :     1955    Referring practitioner:    Maite Dodd PA-C  Date of Initial Visit:       2024          Visit Diagnoses:    ICD-10-CM ICD-9-CM   1. Apraxia  R48.2 784.69   2. Aphasia  R47.01 784.3           SUBJECTIVE     Patient arrived alert and ready for therapy today.    Pain: Patient did not indicate any pain today  OBJECTIVE   GOALS    Goals                                         STG Comments Status   Patient will improve verbal expressive language skills by completing automatic speech tasks, naming objects/pictures, and completing open-ended phrases/sentences Automatic speech continues.  He was able to engage in social greetings. He was able to name line drawing pictures with simple CVC syllable shape (eg cat, bike) and some more complex (apple, belt). Imitated simple words (CT 1) 100% with multiple repetitions. Repeated (CT 1) 70% with multiple repetitions. Of note, patient demonstrates improvement in production of final consonants in CVC words.  Ongoing       Patient will improve comprehension of spoken language by following 1 step then two step directions presented verbally and/or in writing Simple (CT level 1) paragraphs, listening, answered f/3 with 70% and multiple repetitions.  Ongoing   Patient will improve comprehension and expression of written language skills by complete functional reading and writing tasks Patient was able to complete written phrases with single word from F/4 with 95% accuracy.  Organized 3 step directions 80%.  Ongoing   Patient will demonstrate understanding of and use AAC device to augment communication and express wants and needs Patient is using SGD  at home to aid in communication as needed.  He did not bring to therapy.  He did attempt gestures and finger writing during activities today.  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 Naming and repeating is improving.  Worked on stating numbers and generating words for similarities. Ongoing   Patient will comprehend basic spoken and written information presented in home environment Patient demonstrated understanding of spoken information in context.  Ongoing     Therapy Education/Self Care    Details: POC   Given Home Exercise Program  Semantic feature sequencing with final  /m/ syllables   Progress: Reinforced   Education provided to:  Patient   Level of understanding Demonstrated           [address] Speech/Language/Cognitive Treatment,   Total Time of Visit:            55mins             ASSESSMENT/PLAN     ASSESSMENT: Patient  continues to make good progress.  Naming is improving.   He continues with severe verbal apraxia as well as aphasia.  Auditory comprehension difficult to determine due to difficulty with expression.      PLAN: Continue therapy and home exercises.     Progress Note Due Date: 8/1/25  Recertification Due Date: 9/1/25        Signature:  Zabrina Bhat, CCC-SLP, [place] License #: [id number]  Electronically signed on 7/16/2025                [place] [address]

## 2025-07-23 ENCOUNTER — HOSPITAL ENCOUNTER (OUTPATIENT)
Facility: HOSPITAL | Age: 70
Setting detail: THERAPIES SERIES
Discharge: HOME | End: 2025-07-23
Payer: MEDICARE

## 2025-07-23 DIAGNOSIS — R48.2 APRAXIA: Primary | ICD-10-CM

## 2025-07-23 DIAGNOSIS — R47.01 APHASIA: ICD-10-CM

## 2025-07-23 PROCEDURE — 92507 TX SP LANG VOICE COMM INDIV: CPT | Performed by: SPEECH-LANGUAGE PATHOLOGIST

## 2025-07-23 NOTE — PROGRESS NOTES
Speech Language Pathology Treatment Note  [address] [place] [address]    Patient: Justin Londono                                                                                     Visit Date: 2025  :     1955    Referring practitioner:    Maite Dodd PA-C  Date of Initial Visit:       2024          Visit Diagnoses:    ICD-10-CM ICD-9-CM   1. Apraxia  R48.2 784.69   2. Aphasia  R47.01 784.3           SUBJECTIVE     Patient arrived alert and ready for therapy today.    Pain: Patient did not indicate any pain today  OBJECTIVE   GOALS    Goals                                         STG Comments Status   Patient will improve verbal expressive language skills by completing automatic speech tasks, naming objects/pictures, and completing open-ended phrases/sentences Automatic speech continues.  He was able to engage in social greetings. He was able to repeat VC syllables with all long/short vowels, difficulty with final lingua-alveolar consonants (t, d, n, l, ch, s, and z).     Previous: Patient was able to name line drawing pictures with simple CVC syllable shape (eg cat, bike) and some more complex (apple, belt). Imitated simple words (CT 1) 100% with multiple repetitions. Repeated (CT 1) 70% with multiple repetitions. Of note, patient demonstrates improvement in production of final consonants in CVC words.  Ongoing       Patient will improve comprehension of spoken language by following 1 step then two step directions presented verbally and/or in writing Simple (CT level 1) paragraphs, listening, answered f/3 with 86% with less repetitions (increased from 70%).  Ongoing   Patient will improve comprehension and expression of written language skills by complete functional reading and writing tasks Patient was able to organize 3 step directions 96%. (Increased from 80%).  He was able to read short, simple (CT 1)  paragraphs and answer questions with 80% accuracy (baseline today).  Ongoing   Patient will demonstrate understanding of and use AAC device to augment communication and express wants and needs Patient is using SGD at home to aid in communication as needed.  He did not bring to therapy.  He did attempt gestures and finger writing during activities today.  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 Naming and repeating is improving.  Worked on stating numbers and generating words for similarities. Ongoing   Patient will comprehend basic spoken and written information presented in home environment Patient demonstrated understanding of spoken information in context.  Ongoing     Therapy Education/Self Care    Details: POC   Given Home Exercise Program  Semantic feature sequencing with final  /m/ syllables   Progress: Reinforced   Education provided to:  Patient   Level of understanding Demonstrated           [address] Speech/Language/Cognitive Treatment,   Total Time of Visit:            55mins             ASSESSMENT/PLAN     ASSESSMENT: Patient  continues to make good progress.  Naming is improving.   He continues with severe verbal apraxia as well as aphasia.  Auditory comprehension difficult to determine due to difficulty with expression.      PLAN: Continue therapy and home exercises.     Progress Note Due Date: 8/1/25  Recertification Due Date: 9/1/25        Signature:  Zabrina Bhat CCC-SLP, [place] License #: [id number]  Electronically signed on 7/23/2025            [address] [id number]

## 2025-07-30 ENCOUNTER — HOSPITAL ENCOUNTER (OUTPATIENT)
Facility: HOSPITAL | Age: 70
Setting detail: THERAPIES SERIES
Discharge: HOME | End: 2025-07-30
Payer: MEDICARE

## 2025-07-30 ENCOUNTER — OFFICE VISIT (OUTPATIENT)
Dept: NEUROSURGERY | Facility: CLINIC | Age: 70
End: 2025-07-30
Payer: MEDICARE

## 2025-07-30 VITALS — HEIGHT: 69 IN | WEIGHT: 228 LBS | BODY MASS INDEX: 33.77 KG/M2

## 2025-07-30 DIAGNOSIS — M48.02 SPINAL STENOSIS IN CERVICAL REGION: ICD-10-CM

## 2025-07-30 DIAGNOSIS — R47.01 APHASIA: ICD-10-CM

## 2025-07-30 DIAGNOSIS — E66.09 CLASS 1 OBESITY DUE TO EXCESS CALORIES WITH SERIOUS COMORBIDITY AND BODY MASS INDEX (BMI) OF 33.0 TO 33.9 IN ADULT: ICD-10-CM

## 2025-07-30 DIAGNOSIS — M50.30 DDD (DEGENERATIVE DISC DISEASE), CERVICAL: Primary | ICD-10-CM

## 2025-07-30 DIAGNOSIS — R48.2 APRAXIA: Primary | ICD-10-CM

## 2025-07-30 DIAGNOSIS — E66.811 CLASS 1 OBESITY DUE TO EXCESS CALORIES WITH SERIOUS COMORBIDITY AND BODY MASS INDEX (BMI) OF 33.0 TO 33.9 IN ADULT: ICD-10-CM

## 2025-07-30 DIAGNOSIS — R47.01 EXPRESSIVE APHASIA: ICD-10-CM

## 2025-07-30 PROCEDURE — 92507 TX SP LANG VOICE COMM INDIV: CPT | Performed by: SPEECH-LANGUAGE PATHOLOGIST

## 2025-07-30 NOTE — PROGRESS NOTES
Speech Language Pathology Treatment Note and 30 Day Progress Note  [address] [place] [address]    Patient: Justin Londono                                                                                     Visit Date: 2025  :     1955    Referring practitioner:    Maite Dodd PA-C  Date of Initial Visit:       2024          Visit Diagnoses:    ICD-10-CM ICD-9-CM   1. Apraxia  R48.2 784.69   2. Aphasia  R47.01 784.3           SUBJECTIVE     Patient arrived alert and ready for therapy today.    Pain: Patient did not indicate any pain today  OBJECTIVE   GOALS    Goals                                         STG Comments Status   Patient will improve verbal expressive language skills by completing automatic speech tasks, naming objects/pictures, and completing open-ended phrases/sentences Automatic speech continues.  He was able to engage in social greetings and short responses.  Patient was able to complete open-ended phrases with picture/word.  He needed moderate cueing for 2 and 3 syllable words but was able to complete. Ongoing       Patient will improve comprehension of spoken language by following 1 step then two step directions presented verbally and/or in writing Simple (CT level 1) paragraphs, listening, answered f/3 with 94% with few repetitions (increased from 70%, 86%).  Ongoing   Patient will improve comprehension and expression of written language skills by complete functional reading and writing tasks Previous: Patient was able to organize 3 step directions 96%. (Increased from 80%).  He was able to read short, simple (CT 1) paragraphs and answer questions with 80% accuracy (baseline today).  Ongoing   Patient will demonstrate understanding of and use AAC device to augment communication and express wants and needs Patient is using SGD at home to aid in communication as needed.  He did not bring  to therapy.  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 Naming and repeating is improving.   Ongoing   Patient will comprehend basic spoken and written information presented in home environment Patient able to answer questions from short/simple paragraphs (2 part 2 element) with 94% accuracy.  Patient demonstrated understanding of spoken information in context, but did need repetition and clarification. Ongoing     Therapy Education/Self Care    Details: POC   Given Home Exercise Program     Progress: Reinforced   Education provided to:  Patient   Level of understanding Demonstrated           [address] Speech/Language/Cognitive Treatment,   Total Time of Visit:            45 mins             ASSESSMENT/PLAN     ASSESSMENT: Patient  continues to make good progress.  Naming is improving.   He continues with severe verbal apraxia as well as aphasia.  Auditory comprehension improving but difficult to determine due to difficulty with expression.      PLAN: Continue therapy and home exercises.  Patient is to have hip surgery in September and is taking a vacation prior to that, therefore therapy will be held after appointment on 9/3/2025 for recovery from his hip surgery.    Progress Note Due Date: 9/1/25  Recertification Due Date: 9/1/25        Signature:  Zabrina Bhat CCC-SLP, [place] License #: [id number]  Electronically signed on 7/30/2025            [place]. [id number]

## 2025-07-30 NOTE — PROGRESS NOTES
"    Chief complaint:   Chief Complaint   Patient presents with    Follow-up     Follow up from neck surgery he had in Feb 1 2024          Subjective     HPI: I had a chance to see Elias today in follow-up.  He does feel that his speech is improving dramatically and I do think that we can release him from our care at this time however I told Elias he is welcome to give us a call at any time if any new issues arise however he should continue to follow-up with neurology.    Review of Systems      Objective      Vital Signs  Ht 175.3 cm (69\")   Wt 103 kg (228 lb)   BMI 33.67 kg/m²     Physical Exam    Neurological Exam alert and oriented x 3 cranial nerves II through XII grossly intact slight expressive aphasia, moving all extremities    Imaging review:   No new imaging        Assessment/Plan:   Status post ACDF  Status post CVA    Patient is a nonsmoker  The patient's Body mass index is 33.67 kg/m².. BMI is above normal parameters. Recommendations include: continue with current weight loss program    Diagnoses and all orders for this visit:    1. DDD (degenerative disc disease), cervical (Primary)    2. Expressive aphasia    3. Spinal stenosis in cervical region    4. Class 1 obesity due to excess calories with serious comorbidity and body mass index (BMI) of 33.0 to 33.9 in adult        I discussed the patients findings and my recommendations with patient  Raul Salvador DO  07/30/25  12:13 CDT          "

## 2025-08-06 ENCOUNTER — HOSPITAL ENCOUNTER (OUTPATIENT)
Facility: HOSPITAL | Age: 70
Setting detail: THERAPIES SERIES
Discharge: HOME | End: 2025-08-06
Payer: MEDICARE

## 2025-08-06 ENCOUNTER — OFFICE VISIT (OUTPATIENT)
Dept: NEUROLOGY | Facility: CLINIC | Age: 70
End: 2025-08-06
Payer: MEDICARE

## 2025-08-06 VITALS
HEART RATE: 61 BPM | WEIGHT: 228 LBS | SYSTOLIC BLOOD PRESSURE: 118 MMHG | OXYGEN SATURATION: 99 % | BODY MASS INDEX: 33.77 KG/M2 | DIASTOLIC BLOOD PRESSURE: 76 MMHG | HEIGHT: 69 IN

## 2025-08-06 DIAGNOSIS — R44.9 RIGHT-SIDED SENSORY DEFICIT PRESENT: ICD-10-CM

## 2025-08-06 DIAGNOSIS — E78.5 HYPERLIPIDEMIA LDL GOAL <70: ICD-10-CM

## 2025-08-06 DIAGNOSIS — R48.2 APRAXIA: Primary | ICD-10-CM

## 2025-08-06 DIAGNOSIS — I69.30 HISTORY OF STROKE WITH RESIDUAL DEFICIT: Primary | ICD-10-CM

## 2025-08-06 DIAGNOSIS — I69.320 COMBINED RECEPTIVE AND EXPRESSIVE APHASIA AS LATE EFFECT OF CEREBROVASCULAR ACCIDENT (CVA): ICD-10-CM

## 2025-08-06 DIAGNOSIS — R47.01 APHASIA: ICD-10-CM

## 2025-08-06 PROCEDURE — 92507 TX SP LANG VOICE COMM INDIV: CPT | Performed by: SPEECH-LANGUAGE PATHOLOGIST

## 2025-08-12 ENCOUNTER — OFFICE VISIT (OUTPATIENT)
Age: 70
End: 2025-08-12
Payer: MEDICARE

## 2025-08-12 VITALS — HEIGHT: 69 IN | WEIGHT: 231 LBS | BODY MASS INDEX: 34.21 KG/M2

## 2025-08-12 DIAGNOSIS — Z29.9 PROPHYLACTIC MEASURE: ICD-10-CM

## 2025-08-12 DIAGNOSIS — M16.11 PRIMARY OSTEOARTHRITIS OF RIGHT HIP: Primary | ICD-10-CM

## 2025-08-12 PROCEDURE — 99215 OFFICE O/P EST HI 40 MIN: CPT | Performed by: ORTHOPAEDIC SURGERY

## 2025-08-12 PROCEDURE — G8417 CALC BMI ABV UP PARAM F/U: HCPCS | Performed by: ORTHOPAEDIC SURGERY

## 2025-08-12 PROCEDURE — G8427 DOCREV CUR MEDS BY ELIG CLIN: HCPCS | Performed by: ORTHOPAEDIC SURGERY

## 2025-08-12 PROCEDURE — 3017F COLORECTAL CA SCREEN DOC REV: CPT | Performed by: ORTHOPAEDIC SURGERY

## 2025-08-12 PROCEDURE — 1036F TOBACCO NON-USER: CPT | Performed by: ORTHOPAEDIC SURGERY

## 2025-08-12 PROCEDURE — 1123F ACP DISCUSS/DSCN MKR DOCD: CPT | Performed by: ORTHOPAEDIC SURGERY

## 2025-08-12 PROCEDURE — 1159F MED LIST DOCD IN RCRD: CPT | Performed by: ORTHOPAEDIC SURGERY

## 2025-08-12 RX ORDER — MUPIROCIN 2 %
OINTMENT (GRAM) TOPICAL
Qty: 1 EACH | Refills: 0 | Status: SHIPPED | OUTPATIENT
Start: 2025-08-12

## 2025-08-12 RX ORDER — TAMSULOSIN HYDROCHLORIDE 0.4 MG/1
0.4 CAPSULE ORAL DAILY
Qty: 30 CAPSULE | Refills: 0 | Status: SHIPPED | OUTPATIENT
Start: 2025-08-12

## 2025-08-12 ASSESSMENT — PROMIS GLOBAL HEALTH SCALE
SUM OF RESPONSES TO QUESTIONS 2, 4, 5, & 10: 14
IN GENERAL, WOULD YOU SAY YOUR QUALITY OF LIFE IS...[ON A SCALE OF 1 (POOR) TO 5 (EXCELLENT)]: VERY GOOD
TO WHAT EXTENT ARE YOU ABLE TO CARRY OUT YOUR EVERYDAY PHYSICAL ACTIVITIES SUCH AS WALKING, CLIMBING STAIRS, CARRYING GROCERIES, OR MOVING A CHAIR [ON A SCALE OF 1 (NOT AT ALL) TO 5 (COMPLETELY)]?: MODERATELY
IN GENERAL, WOULD YOU SAY YOUR HEALTH IS...[ON A SCALE OF 1 (POOR) TO 5 (EXCELLENT)]: EXCELLENT
IN THE PAST 7 DAYS, HOW WOULD YOU RATE YOUR FATIGUE ON AVERAGE [ON A SCALE FROM 1 (NONE) TO 5 (VERY SEVERE)]?: MILD
IN GENERAL, HOW WOULD YOU RATE YOUR MENTAL HEALTH, INCLUDING YOUR MOOD AND YOUR ABILITY TO THINK [ON A SCALE OF 1 (POOR) TO 5 (EXCELLENT)]?: GOOD
IN GENERAL, PLEASE RATE HOW WELL YOU CARRY OUT YOUR USUAL SOCIAL ACTIVITIES (INCLUDES ACTIVITIES AT HOME, AT WORK, AND IN YOUR COMMUNITY, AND RESPONSIBILITIES AS A PARENT, CHILD, SPOUSE, EMPLOYEE, FRIEND, ETC) [ON A SCALE OF 1 (POOR) TO 5 (EXCELLENT)]?: GOOD
SUM OF RESPONSES TO QUESTIONS 3, 6, 7, & 8: 18
IN GENERAL, HOW WOULD YOU RATE YOUR SATISFACTION WITH YOUR SOCIAL ACTIVITIES AND RELATIONSHIPS [ON A SCALE OF 1 (POOR) TO 5 (EXCELLENT)]?: VERY GOOD
WHO IS THE PERSON COMPLETING THE PROMIS V1.1 SURVEY?: SELF
HOW IS THE PROMIS V1.1 BEING ADMINISTERED?: PAPER
IN GENERAL, HOW WOULD YOU RATE YOUR PHYSICAL HEALTH [ON A SCALE OF 1 (POOR) TO 5 (EXCELLENT)]?: VERY GOOD
IN THE PAST 7 DAYS, HOW OFTEN HAVE YOU BEEN BOTHERED BY EMOTIONAL PROBLEMS, SUCH AS FEELING ANXIOUS, DEPRESSED, OR IRRITABLE [ON A SCALE FROM 1 (NEVER) TO 5 (ALWAYS)]?: SOMETIMES
IN THE PAST 7 DAYS, HOW WOULD YOU RATE YOUR PAIN ON AVERAGE [ON A SCALE FROM 0 (NO PAIN) TO 10 (WORST IMAGINABLE PAIN)]?: 7

## 2025-08-12 ASSESSMENT — HOOS JR
HOOS JR RAW SCORE: 14
BENDING TO THE FLOOR TO PICK UP OBJECT: MODERATE
HOOS JR RAW SCORE: 14
SITTING: MODERATE
LYING IN BED (TURNING OVER, MAINTAINING HIP POSITION): MODERATE
WALKING ON UNEVEN SURFACE: SEVERE
HOOS JR TOTAL INTERVAL SCORE: 46.652
RISING FROM SITTING: MODERATE
GOING UP OR DOWN STAIRS: SEVERE

## 2025-08-13 ENCOUNTER — HOSPITAL ENCOUNTER (OUTPATIENT)
Facility: HOSPITAL | Age: 70
Setting detail: THERAPIES SERIES
Discharge: HOME | End: 2025-08-13
Payer: MEDICARE

## 2025-08-13 DIAGNOSIS — R48.2 APRAXIA: Primary | ICD-10-CM

## 2025-08-13 DIAGNOSIS — R47.01 APHASIA: ICD-10-CM

## 2025-08-13 PROCEDURE — 92507 TX SP LANG VOICE COMM INDIV: CPT | Performed by: SPEECH-LANGUAGE PATHOLOGIST

## 2025-08-20 ENCOUNTER — HOSPITAL ENCOUNTER (OUTPATIENT)
Facility: HOSPITAL | Age: 70
Setting detail: THERAPIES SERIES
Discharge: HOME | End: 2025-08-20
Payer: MEDICARE

## 2025-08-20 DIAGNOSIS — R48.2 APRAXIA: Primary | ICD-10-CM

## 2025-08-20 DIAGNOSIS — R47.01 EXPRESSIVE APHASIA: ICD-10-CM

## 2025-08-20 DIAGNOSIS — R47.01 APHASIA: ICD-10-CM

## 2025-08-20 PROCEDURE — 92507 TX SP LANG VOICE COMM INDIV: CPT | Performed by: SPEECH-LANGUAGE PATHOLOGIST

## 2025-09-02 ENCOUNTER — TELEPHONE (OUTPATIENT)
Age: 70
End: 2025-09-02

## (undated) DEVICE — DEFENDO AIR WATER SUCTION AND BIOPSY VALVE KIT FOR  OLYMPUS: Brand: DEFENDO AIR/WATER/SUCTION AND BIOPSY VALVE

## (undated) DEVICE — CUFF,BP,DISP,1 TUBE,ADULT,HP: Brand: MEDLINE

## (undated) DEVICE — THE CHANNEL CLEANING BRUSH IS A NYLON FLEXI BRUSH ATTACHED TO A FLEXIBLE PLASTIC SHEATH DESIGNED TO SAFELY REMOVE DEBRIS FROM FLEXIBLE ENDOSCOPES.

## (undated) DEVICE — ARGYLE YANKAUER BULB TIP WITH VENT: Brand: ARGYLE

## (undated) DEVICE — MASK,OXYGEN,MED CONC,ADLT,7' TUB, UC: Brand: PENDING

## (undated) DEVICE — SENSR O2 OXIMAX FNGR A/ 18IN NONSTR